# Patient Record
Sex: MALE | Race: ASIAN
[De-identification: names, ages, dates, MRNs, and addresses within clinical notes are randomized per-mention and may not be internally consistent; named-entity substitution may affect disease eponyms.]

---

## 2017-02-15 ENCOUNTER — CHART COPY (OUTPATIENT)
Age: 72
End: 2017-02-15

## 2017-02-23 ENCOUNTER — APPOINTMENT (OUTPATIENT)
Dept: ENDOCRINOLOGY | Facility: CLINIC | Age: 72
End: 2017-02-23

## 2017-02-23 VITALS
BODY MASS INDEX: 27.31 KG/M2 | OXYGEN SATURATION: 98 % | WEIGHT: 174 LBS | HEART RATE: 67 BPM | SYSTOLIC BLOOD PRESSURE: 102 MMHG | DIASTOLIC BLOOD PRESSURE: 70 MMHG | HEIGHT: 67 IN

## 2017-02-23 RX ORDER — ERGOCALCIFEROL 1.25 MG/1
1.25 MG CAPSULE ORAL
Refills: 0 | Status: ACTIVE | COMMUNITY

## 2017-02-28 LAB
24R-OH-CALCIDIOL SERPL-MCNC: 54.8 PG/ML
25(OH)D3 SERPL-MCNC: 77.6 NG/ML
ALBUMIN SERPL ELPH-MCNC: 4 G/DL
ALP BLD-CCNC: 70 U/L
ALT SERPL-CCNC: 31 U/L
ANION GAP SERPL CALC-SCNC: 12 MMOL/L
AST SERPL-CCNC: 26 U/L
BILIRUB SERPL-MCNC: 0.5 MG/DL
BUN SERPL-MCNC: 10 MG/DL
CALCIUM SERPL-MCNC: 8.8 MG/DL
CALCIUM SERPL-MCNC: 8.8 MG/DL
CHLORIDE SERPL-SCNC: 103 MMOL/L
CHOLEST SERPL-MCNC: 171 MG/DL
CHOLEST/HDLC SERPL: 4.8 RATIO
CO2 SERPL-SCNC: 26 MMOL/L
CREAT SERPL-MCNC: 1.13 MG/DL
GLUCOSE SERPL-MCNC: 92 MG/DL
HDLC SERPL-MCNC: 36 MG/DL
LDLC SERPL CALC-MCNC: 111 MG/DL
MAGNESIUM SERPL-MCNC: 2 MG/DL
PARATHYROID HORMONE INTACT: 54 PG/ML
PHOSPHATE SERPL-MCNC: 3.4 MG/DL
POTASSIUM SERPL-SCNC: 4.8 MMOL/L
PROT SERPL-MCNC: 6.5 G/DL
SODIUM SERPL-SCNC: 141 MMOL/L
TRIGL SERPL-MCNC: 119 MG/DL
TSH SERPL-ACNC: 2.16 UIU/ML
TTG IGA SER IA-ACNC: 7.7 UNITS
TTG IGA SER-ACNC: NEGATIVE

## 2017-04-26 ENCOUNTER — APPOINTMENT (OUTPATIENT)
Dept: ORTHOPEDIC SURGERY | Facility: CLINIC | Age: 72
End: 2017-04-26
Payer: MEDICARE

## 2017-04-26 VITALS
HEART RATE: 64 BPM | HEIGHT: 67 IN | DIASTOLIC BLOOD PRESSURE: 73 MMHG | WEIGHT: 174 LBS | SYSTOLIC BLOOD PRESSURE: 121 MMHG | BODY MASS INDEX: 27.31 KG/M2

## 2017-04-26 PROCEDURE — 99214 OFFICE O/P EST MOD 30 MIN: CPT | Mod: 25

## 2017-04-26 PROCEDURE — 20610 DRAIN/INJ JOINT/BURSA W/O US: CPT | Mod: LT

## 2017-04-26 PROCEDURE — 73562 X-RAY EXAM OF KNEE 3: CPT | Mod: LT

## 2017-04-26 RX ORDER — HYALURONATE SOD, CROSS-LINKED 30 MG/3 ML
30 SYRINGE (ML) INTRAARTICULAR
Qty: 2 | Refills: 0 | Status: ACTIVE | OUTPATIENT
Start: 2017-04-26

## 2017-07-24 ENCOUNTER — APPOINTMENT (OUTPATIENT)
Dept: ORTHOPEDIC SURGERY | Facility: CLINIC | Age: 72
End: 2017-07-24
Payer: MEDICARE

## 2017-07-24 VITALS
WEIGHT: 173 LBS | SYSTOLIC BLOOD PRESSURE: 132 MMHG | BODY MASS INDEX: 27.15 KG/M2 | HEART RATE: 65 BPM | HEIGHT: 67 IN | DIASTOLIC BLOOD PRESSURE: 75 MMHG

## 2017-07-24 PROCEDURE — 20610 DRAIN/INJ JOINT/BURSA W/O US: CPT | Mod: 50

## 2017-07-24 PROCEDURE — 99214 OFFICE O/P EST MOD 30 MIN: CPT | Mod: 25

## 2017-07-31 ENCOUNTER — CHART COPY (OUTPATIENT)
Age: 72
End: 2017-07-31

## 2017-11-10 ENCOUNTER — APPOINTMENT (OUTPATIENT)
Dept: ORTHOPEDIC SURGERY | Facility: CLINIC | Age: 72
End: 2017-11-10
Payer: MEDICARE

## 2017-11-10 VITALS — SYSTOLIC BLOOD PRESSURE: 131 MMHG | HEIGHT: 67 IN | DIASTOLIC BLOOD PRESSURE: 81 MMHG | HEART RATE: 74 BPM

## 2017-11-10 PROCEDURE — 20610 DRAIN/INJ JOINT/BURSA W/O US: CPT | Mod: 50

## 2017-11-16 RX ADMIN — Medication 0 MG/3ML: at 00:00

## 2017-11-20 RX ORDER — HYALURONATE SOD, CROSS-LINKED 30 MG/3 ML
30 SYRINGE (ML) INTRAARTICULAR
Refills: 0 | Status: COMPLETED | OUTPATIENT
Start: 2017-11-16

## 2018-02-21 ENCOUNTER — CHART COPY (OUTPATIENT)
Age: 73
End: 2018-02-21

## 2018-02-27 ENCOUNTER — APPOINTMENT (OUTPATIENT)
Dept: ORTHOPEDIC SURGERY | Facility: CLINIC | Age: 73
End: 2018-02-27
Payer: MEDICARE

## 2018-02-27 VITALS
BODY MASS INDEX: 27.15 KG/M2 | DIASTOLIC BLOOD PRESSURE: 82 MMHG | WEIGHT: 173 LBS | HEART RATE: 70 BPM | SYSTOLIC BLOOD PRESSURE: 122 MMHG | HEIGHT: 67 IN

## 2018-02-27 PROCEDURE — 99213 OFFICE O/P EST LOW 20 MIN: CPT | Mod: 25

## 2018-02-27 PROCEDURE — 20610 DRAIN/INJ JOINT/BURSA W/O US: CPT | Mod: LT

## 2018-02-27 PROCEDURE — 73562 X-RAY EXAM OF KNEE 3: CPT | Mod: 50

## 2018-02-27 RX ADMIN — LIDOCAINE HYDROCHLORIDE 3 %: 10 INJECTION, SOLUTION EPIDURAL; INFILTRATION; INTRACAUDAL; PERINEURAL at 00:00

## 2018-02-27 RX ADMIN — Medication 2 MG/ML: at 00:00

## 2018-03-01 RX ORDER — LIDOCAINE HYDROCHLORIDE 10 MG/ML
1 INJECTION, SOLUTION INFILTRATION; PERINEURAL
Refills: 0 | Status: COMPLETED | OUTPATIENT
Start: 2018-02-27

## 2018-03-01 RX ORDER — METHYLPRED ACET/NACL,ISO-OS/PF 40 MG/ML
40 VIAL (ML) INJECTION
Qty: 1 | Refills: 0 | Status: COMPLETED | OUTPATIENT
Start: 2018-02-27

## 2018-03-13 ENCOUNTER — APPOINTMENT (OUTPATIENT)
Dept: ORTHOPEDIC SURGERY | Facility: CLINIC | Age: 73
End: 2018-03-13
Payer: MEDICARE

## 2018-03-13 VITALS
SYSTOLIC BLOOD PRESSURE: 120 MMHG | BODY MASS INDEX: 27.15 KG/M2 | WEIGHT: 173 LBS | HEART RATE: 80 BPM | DIASTOLIC BLOOD PRESSURE: 74 MMHG | HEIGHT: 67 IN

## 2018-03-13 DIAGNOSIS — M19.079 PRIMARY OSTEOARTHRITIS, UNSPECIFIED ANKLE AND FOOT: ICD-10-CM

## 2018-03-13 PROCEDURE — 99213 OFFICE O/P EST LOW 20 MIN: CPT | Mod: 25

## 2018-03-13 PROCEDURE — 73630 X-RAY EXAM OF FOOT: CPT | Mod: LT

## 2018-03-13 PROCEDURE — 20605 DRAIN/INJ JOINT/BURSA W/O US: CPT | Mod: LT

## 2018-04-10 ENCOUNTER — CHART COPY (OUTPATIENT)
Age: 73
End: 2018-04-10

## 2018-05-15 ENCOUNTER — APPOINTMENT (OUTPATIENT)
Dept: ORTHOPEDIC SURGERY | Facility: CLINIC | Age: 73
End: 2018-05-15
Payer: MEDICARE

## 2018-05-15 VITALS
HEART RATE: 64 BPM | HEIGHT: 67 IN | WEIGHT: 173 LBS | BODY MASS INDEX: 27.15 KG/M2 | SYSTOLIC BLOOD PRESSURE: 116 MMHG | DIASTOLIC BLOOD PRESSURE: 73 MMHG

## 2018-05-15 DIAGNOSIS — M19.072 PRIMARY OSTEOARTHRITIS, LEFT ANKLE AND FOOT: ICD-10-CM

## 2018-05-15 PROCEDURE — 73630 X-RAY EXAM OF FOOT: CPT | Mod: RT

## 2018-05-15 PROCEDURE — 99213 OFFICE O/P EST LOW 20 MIN: CPT

## 2018-07-19 ENCOUNTER — EMERGENCY (EMERGENCY)
Facility: HOSPITAL | Age: 73
LOS: 1 days | Discharge: HOME | End: 2018-07-19
Attending: EMERGENCY MEDICINE
Payer: MEDICARE

## 2018-07-19 VITALS
HEART RATE: 68 BPM | OXYGEN SATURATION: 100 % | SYSTOLIC BLOOD PRESSURE: 127 MMHG | RESPIRATION RATE: 18 BRPM | DIASTOLIC BLOOD PRESSURE: 81 MMHG

## 2018-07-19 VITALS
WEIGHT: 166.89 LBS | SYSTOLIC BLOOD PRESSURE: 106 MMHG | OXYGEN SATURATION: 98 % | DIASTOLIC BLOOD PRESSURE: 77 MMHG | TEMPERATURE: 98 F | HEIGHT: 66 IN | HEART RATE: 95 BPM | RESPIRATION RATE: 18 BRPM

## 2018-07-19 LAB
ALBUMIN SERPL ELPH-MCNC: 3.4 G/DL — SIGNIFICANT CHANGE UP (ref 3.3–5)
ALP SERPL-CCNC: 82 U/L — SIGNIFICANT CHANGE UP (ref 40–120)
ALT FLD-CCNC: 14 U/L — SIGNIFICANT CHANGE UP (ref 10–45)
ANION GAP SERPL CALC-SCNC: 13 MMOL/L — SIGNIFICANT CHANGE UP (ref 5–17)
AST SERPL-CCNC: 18 U/L — SIGNIFICANT CHANGE UP (ref 10–40)
BASOPHILS # BLD AUTO: 0 K/UL — SIGNIFICANT CHANGE UP (ref 0–0.2)
BASOPHILS NFR BLD AUTO: 0.4 % — SIGNIFICANT CHANGE UP (ref 0–2)
BILIRUB SERPL-MCNC: 0.4 MG/DL — SIGNIFICANT CHANGE UP (ref 0.2–1.2)
BUN SERPL-MCNC: 9 MG/DL — SIGNIFICANT CHANGE UP (ref 7–23)
C DIFF GDH STL QL: NEGATIVE — SIGNIFICANT CHANGE UP
C DIFF GDH STL QL: SIGNIFICANT CHANGE UP
CALCIUM SERPL-MCNC: 8.8 MG/DL — SIGNIFICANT CHANGE UP (ref 8.4–10.5)
CHLORIDE SERPL-SCNC: 103 MMOL/L — SIGNIFICANT CHANGE UP (ref 96–108)
CO2 SERPL-SCNC: 23 MMOL/L — SIGNIFICANT CHANGE UP (ref 22–31)
CREAT SERPL-MCNC: 1.29 MG/DL — SIGNIFICANT CHANGE UP (ref 0.5–1.3)
EOSINOPHIL # BLD AUTO: 0 K/UL — SIGNIFICANT CHANGE UP (ref 0–0.5)
EOSINOPHIL NFR BLD AUTO: 0.4 % — SIGNIFICANT CHANGE UP (ref 0–6)
GAS PNL BLDV: SIGNIFICANT CHANGE UP
GLUCOSE SERPL-MCNC: 112 MG/DL — HIGH (ref 70–99)
HCT VFR BLD CALC: 46.3 % — SIGNIFICANT CHANGE UP (ref 39–50)
HGB BLD-MCNC: 15.1 G/DL — SIGNIFICANT CHANGE UP (ref 13–17)
LYMPHOCYTES # BLD AUTO: 17.1 % — SIGNIFICANT CHANGE UP (ref 13–44)
LYMPHOCYTES # BLD AUTO: 2 K/UL — SIGNIFICANT CHANGE UP (ref 1–3.3)
MCHC RBC-ENTMCNC: 30.1 PG — SIGNIFICANT CHANGE UP (ref 27–34)
MCHC RBC-ENTMCNC: 32.6 GM/DL — SIGNIFICANT CHANGE UP (ref 32–36)
MCV RBC AUTO: 92.3 FL — SIGNIFICANT CHANGE UP (ref 80–100)
MONOCYTES # BLD AUTO: 0.5 K/UL — SIGNIFICANT CHANGE UP (ref 0–0.9)
MONOCYTES NFR BLD AUTO: 4.1 % — SIGNIFICANT CHANGE UP (ref 2–14)
NEUTROPHILS # BLD AUTO: 9.1 K/UL — HIGH (ref 1.8–7.4)
NEUTROPHILS NFR BLD AUTO: 77.9 % — HIGH (ref 43–77)
PLATELET # BLD AUTO: 288 K/UL — SIGNIFICANT CHANGE UP (ref 150–400)
POTASSIUM SERPL-MCNC: 4.3 MMOL/L — SIGNIFICANT CHANGE UP (ref 3.5–5.3)
POTASSIUM SERPL-SCNC: 4.3 MMOL/L — SIGNIFICANT CHANGE UP (ref 3.5–5.3)
PROT SERPL-MCNC: 6.7 G/DL — SIGNIFICANT CHANGE UP (ref 6–8.3)
RBC # BLD: 5.01 M/UL — SIGNIFICANT CHANGE UP (ref 4.2–5.8)
RBC # FLD: 12.4 % — SIGNIFICANT CHANGE UP (ref 10.3–14.5)
SODIUM SERPL-SCNC: 139 MMOL/L — SIGNIFICANT CHANGE UP (ref 135–145)
WBC # BLD: 11.6 K/UL — HIGH (ref 3.8–10.5)
WBC # FLD AUTO: 11.6 K/UL — HIGH (ref 3.8–10.5)

## 2018-07-19 PROCEDURE — 82803 BLOOD GASES ANY COMBINATION: CPT

## 2018-07-19 PROCEDURE — 82272 OCCULT BLD FECES 1-3 TESTS: CPT

## 2018-07-19 PROCEDURE — 82330 ASSAY OF CALCIUM: CPT

## 2018-07-19 PROCEDURE — 82947 ASSAY GLUCOSE BLOOD QUANT: CPT

## 2018-07-19 PROCEDURE — 80053 COMPREHEN METABOLIC PANEL: CPT

## 2018-07-19 PROCEDURE — 74174 CTA ABD&PLVS W/CONTRAST: CPT

## 2018-07-19 PROCEDURE — 87324 CLOSTRIDIUM AG IA: CPT

## 2018-07-19 PROCEDURE — 99284 EMERGENCY DEPT VISIT MOD MDM: CPT | Mod: GC

## 2018-07-19 PROCEDURE — 84295 ASSAY OF SERUM SODIUM: CPT

## 2018-07-19 PROCEDURE — 87449 NOS EACH ORGANISM AG IA: CPT

## 2018-07-19 PROCEDURE — 74178 CT ABD&PLV WO CNTR FLWD CNTR: CPT

## 2018-07-19 PROCEDURE — 85027 COMPLETE CBC AUTOMATED: CPT

## 2018-07-19 PROCEDURE — 85014 HEMATOCRIT: CPT

## 2018-07-19 PROCEDURE — 84132 ASSAY OF SERUM POTASSIUM: CPT

## 2018-07-19 PROCEDURE — 83605 ASSAY OF LACTIC ACID: CPT

## 2018-07-19 PROCEDURE — 99284 EMERGENCY DEPT VISIT MOD MDM: CPT | Mod: 25

## 2018-07-19 PROCEDURE — 82435 ASSAY OF BLOOD CHLORIDE: CPT

## 2018-07-19 PROCEDURE — 96374 THER/PROPH/DIAG INJ IV PUSH: CPT | Mod: XU

## 2018-07-19 PROCEDURE — 74178 CT ABD&PLV WO CNTR FLWD CNTR: CPT | Mod: 26

## 2018-07-19 RX ORDER — ONDANSETRON 8 MG/1
4 TABLET, FILM COATED ORAL ONCE
Qty: 0 | Refills: 0 | Status: COMPLETED | OUTPATIENT
Start: 2018-07-19 | End: 2018-07-19

## 2018-07-19 RX ORDER — SODIUM CHLORIDE 9 MG/ML
1000 INJECTION INTRAMUSCULAR; INTRAVENOUS; SUBCUTANEOUS ONCE
Qty: 0 | Refills: 0 | Status: COMPLETED | OUTPATIENT
Start: 2018-07-19 | End: 2018-07-19

## 2018-07-19 RX ORDER — ONDANSETRON 8 MG/1
1 TABLET, FILM COATED ORAL
Qty: 12 | Refills: 0
Start: 2018-07-19 | End: 2018-07-22

## 2018-07-19 RX ADMIN — SODIUM CHLORIDE 2000 MILLILITER(S): 9 INJECTION INTRAMUSCULAR; INTRAVENOUS; SUBCUTANEOUS at 17:16

## 2018-07-19 RX ADMIN — ONDANSETRON 4 MILLIGRAM(S): 8 TABLET, FILM COATED ORAL at 19:19

## 2018-07-19 RX ADMIN — Medication 20 MILLIGRAM(S): at 19:19

## 2018-07-19 NOTE — ED PROVIDER NOTE - OBJECTIVE STATEMENT
74 yo c PMH of hiatal hernia and GERD p/w 1 day hx of bright red bloody diarrhea x 5 episodes today associated with BLQ abdominal pain that occurs when pt feels urge to have BM, abdominal pain alleviated after BM. Pt was in [place] 14 days ago, was bitten by an insect, developed an abscess on the L thigh, was started on Augmentin 8 days ago and abscess I&D'd by general surgery 4 days ago. Pt did not take augmentin today. No hx of GIB, pt had normal endoscopy and colonscopy 6 years ago. Pt went to GI doctor today who referred him to ED with note: "73 yr Male c drained abscess L thigh, now has acute onset rectal bleeding and watery diarrhea c abdominal pain and 3 episodes of vomiting. P: 97/min, BP: 108/60, h/o augmentin use. PE: tenderness in hypogastric region, rectaL: FOBT+, infectious colitis vs c diff colitis vs ischemic colitiis" - Dr Carlos Small     ROS positive: bloody diarrhea, abdominal pain, n/v, lightheadedness  ROS negative: f/c, CP, SOB, syncope, leg edema, hemoptysis

## 2018-07-19 NOTE — ED PROVIDER NOTE - MEDICAL DECISION MAKING DETAILS
74 yo c PMH of hiatal hernia and GERD p/w 1 day hx of bright red bloody diarrhea x 5 episodes today associated with BLQ abdominal pain, lightheadedness, and n/v. On exam, HR 95, /77, VS otherwise wnl, no abdominal ttp. labs and rectal exam pending. IVF ordered. pt denies pain at this time and declines pain meds at this time. will reassess. 72 yo c PMH of hiatal hernia and GERD p/w 1 day hx of bright red bloody diarrhea x 5 episodes today associated with BLQ abdominal pain, lightheadedness, and n/v. On exam, HR 95, /77, VS otherwise wnl, no abdominal ttp. labs and rectal exam negative however no feces able to be collected on exam, FOBT+ in GI office today per GI note. IVF ordered. pt denies pain at this time and declines pain meds at this time. will reassess.

## 2018-07-19 NOTE — ED PROVIDER NOTE - ATTENDING CONTRIBUTION TO CARE
Patient presenting with multiple episodes of bloody diarrhea today.  Was on course of augmentin, just completed.  Reporting diffuse abdominal cramping prior to bowel movements, no nausea or vomiting, no sick contacts.  Returned from trip to [place] approx one week ago.    On exam patient non toxic appearing, vital signs within normal limits, RRR S1/S2, lungs clear to ascultation bilaterally, abdomen diffusely tender to palpation (mildly) without rebound or guarding.    DDx includes post antibiotic diarrhea, c-diff, other infectious colitis, also possible ischemic colitis, plan for labs, stool studies/cultures/O&P given recent travel, CT A/p to evaluate for degree of colitis and r/o mesenteric ischemia, re-eval.

## 2018-07-19 NOTE — ED PROVIDER NOTE - PHYSICAL EXAMINATION
GENERAL: Well appearing, well nourished, awake, alert and in NAD  ENMT: Airway patent, no maury blood   CARDIAC: Regular rate, regular rhythm.  Heart sounds S1, S2, no S3, S4. No murmurs, rubs or gallops.  RESPIRATORY: Breath sounds clear and equal in bilateral anterior lung fields, no wheezes/ronchi/stridor; pt breathing and speaking comfortably with no increased WOB, no accessory mm. use, no intercostal retractions, no nasal flaring  GI: no ecchymosis, erythema, or lacerations, abdomen soft, non-distended, non-tender, no rebound or guarding. GENERAL: Well appearing, well nourished, awake, alert and in NAD  ENMT: Airway patent, no maury blood   CARDIAC: Regular rate, regular rhythm.  Heart sounds S1, S2, no S3, S4. No murmurs, rubs or gallops.  RESPIRATORY: Breath sounds clear and equal in bilateral anterior lung fields, no wheezes/ronchi/stridor; pt breathing and speaking comfortably with no increased WOB, no accessory mm. use, no intercostal retractions, no nasal flaring  GI: no ecchymosis, erythema, or lacerations, abdomen soft, non-distended, non-tender, no rebound or guarding.  guiac negative, chaperone: Montrell Maravilla MD

## 2018-07-19 NOTE — ED ADULT TRIAGE NOTE - CHIEF COMPLAINT QUOTE
black stools associated with abdominal pain, took Augmentin for 7 days for abscess of left thigh, dizziness last night

## 2018-07-19 NOTE — ED PROVIDER NOTE - PROGRESS NOTE DETAILS
Patient reporting feeling improved.  Hbg normal.  CTA without evidence of mesenteric ischemia.  Tolerating PO in Emergency Department.  Has GI follow up tomorrow.  Will discharge.  Callum Maravilla M.D.

## 2018-08-15 ENCOUNTER — CHART COPY (OUTPATIENT)
Age: 73
End: 2018-08-15

## 2018-08-27 PROBLEM — K44.9 DIAPHRAGMATIC HERNIA WITHOUT OBSTRUCTION OR GANGRENE: Chronic | Status: ACTIVE | Noted: 2018-07-19

## 2018-08-28 ENCOUNTER — APPOINTMENT (OUTPATIENT)
Dept: ORTHOPEDIC SURGERY | Facility: CLINIC | Age: 73
End: 2018-08-28
Payer: MEDICARE

## 2018-08-28 VITALS — HEART RATE: 78 BPM | DIASTOLIC BLOOD PRESSURE: 80 MMHG | SYSTOLIC BLOOD PRESSURE: 138 MMHG

## 2018-08-28 PROCEDURE — 20610 DRAIN/INJ JOINT/BURSA W/O US: CPT | Mod: 50

## 2018-10-23 ENCOUNTER — APPOINTMENT (OUTPATIENT)
Dept: ORTHOPEDIC SURGERY | Facility: CLINIC | Age: 73
End: 2018-10-23
Payer: MEDICARE

## 2018-10-23 VITALS — HEART RATE: 66 BPM | DIASTOLIC BLOOD PRESSURE: 74 MMHG | SYSTOLIC BLOOD PRESSURE: 112 MMHG

## 2018-10-23 DIAGNOSIS — M19.071 PRIMARY OSTEOARTHRITIS, RIGHT ANKLE AND FOOT: ICD-10-CM

## 2018-10-23 PROCEDURE — 20610 DRAIN/INJ JOINT/BURSA W/O US: CPT | Mod: RT

## 2018-10-23 PROCEDURE — 99213 OFFICE O/P EST LOW 20 MIN: CPT | Mod: 25

## 2018-10-23 PROCEDURE — 73562 X-RAY EXAM OF KNEE 3: CPT | Mod: RT

## 2018-10-23 RX ADMIN — LIDOCAINE HYDROCHLORIDE 3 %: 10 INJECTION, SOLUTION INFILTRATION; PERINEURAL at 00:00

## 2018-10-23 RX ADMIN — METHYLPREDNISOLONE ACETATE 2 MG/ML: 40 INJECTION, SUSPENSION INTRALESIONAL; INTRAMUSCULAR; INTRASYNOVIAL; SOFT TISSUE at 00:00

## 2018-10-24 RX ORDER — LIDOCAINE HYDROCHLORIDE 10 MG/ML
1 INJECTION, SOLUTION INFILTRATION; PERINEURAL
Refills: 0 | Status: COMPLETED | OUTPATIENT
Start: 2018-10-23

## 2018-10-24 RX ORDER — METHYLPRED ACET/NACL,ISO-OS/PF 40 MG/ML
40 VIAL (ML) INJECTION
Qty: 1 | Refills: 0 | Status: COMPLETED | OUTPATIENT
Start: 2018-10-23

## 2019-04-17 ENCOUNTER — APPOINTMENT (OUTPATIENT)
Dept: ORTHOPEDIC SURGERY | Facility: CLINIC | Age: 74
End: 2019-04-17
Payer: MEDICARE

## 2019-04-17 VITALS
WEIGHT: 170 LBS | DIASTOLIC BLOOD PRESSURE: 82 MMHG | SYSTOLIC BLOOD PRESSURE: 133 MMHG | HEIGHT: 66 IN | HEART RATE: 65 BPM | BODY MASS INDEX: 27.32 KG/M2

## 2019-04-17 DIAGNOSIS — M19.041 PRIMARY OSTEOARTHRITIS, RIGHT HAND: ICD-10-CM

## 2019-04-17 DIAGNOSIS — S63.91XA SPRAIN OF UNSPECIFIED PART OF RIGHT WRIST AND HAND, INITIAL ENCOUNTER: ICD-10-CM

## 2019-04-17 PROCEDURE — 73130 X-RAY EXAM OF HAND: CPT | Mod: RT

## 2019-04-17 PROCEDURE — 99213 OFFICE O/P EST LOW 20 MIN: CPT

## 2019-04-17 NOTE — PHYSICAL EXAM
[de-identified] : - Constitutional: This is a male, in no obvious distress.  \par - Psych: Patient is alert and oriented to person, place and time.  Patient has a normal mood and affect.\par - Cardiovascular: Normal pulses throughout the upper extremities.  \par - Respiratory:  Patient exhibits no evidence of shortness of breath or difficulty breathing.\par - Skin: No rashes, lesions, or other abnormalities are noted in the upper extremities.\par \par ---\par \par Examination of his right hand demonstrates swelling along the index finger, proximal phalanx.  He is tender in this region.  He also has tenderness along the PIP joint.  He has full extension with limitation of terminal flexion.  He appears to have some underlying arthritis.  There is no instability of the index finger PIP or MCP joint.  He has swelling and tenderness of the CMC joint of the thumb, consistent with arthritis.  He is neurovascularly intact distally. [de-identified] : \par AP, lateral, and oblique, radiographs of his right hand, demonstrate no acute fractures.  He is underlying arthritis of the digits, including the CMC joint of the thumb, and the PIP, and DIP joints of the index finger.

## 2019-04-17 NOTE — HISTORY OF PRESENT ILLNESS
[Right] : right hand dominant [FreeTextEntry1] : He comes in today for evaluation of a right index finger injury.  He accidentally hit the finger on a wall 2 days ago.  He has had pain and swelling since then.  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

## 2019-04-23 ENCOUNTER — APPOINTMENT (OUTPATIENT)
Dept: ORTHOPEDIC SURGERY | Facility: CLINIC | Age: 74
End: 2019-04-23
Payer: MEDICARE

## 2019-04-23 VITALS — SYSTOLIC BLOOD PRESSURE: 137 MMHG | DIASTOLIC BLOOD PRESSURE: 86 MMHG | HEART RATE: 62 BPM

## 2019-04-23 PROCEDURE — 73562 X-RAY EXAM OF KNEE 3: CPT | Mod: 50

## 2019-04-23 PROCEDURE — 20610 DRAIN/INJ JOINT/BURSA W/O US: CPT | Mod: 50

## 2019-04-23 PROCEDURE — 99213 OFFICE O/P EST LOW 20 MIN: CPT | Mod: 25

## 2019-04-23 RX ADMIN — Medication 0 MG/3ML: at 00:00

## 2019-04-23 RX ADMIN — LIDOCAINE HYDROCHLORIDE 3 %: 10 INJECTION, SOLUTION INFILTRATION; PERINEURAL at 00:00

## 2019-04-26 RX ORDER — LIDOCAINE HYDROCHLORIDE 10 MG/ML
1 INJECTION, SOLUTION INFILTRATION; PERINEURAL
Refills: 0 | Status: COMPLETED | OUTPATIENT
Start: 2019-04-23

## 2019-04-26 RX ORDER — HYALURONATE SOD, CROSS-LINKED 30 MG/3 ML
30 SYRINGE (ML) INTRAARTICULAR
Refills: 0 | Status: COMPLETED | OUTPATIENT
Start: 2019-04-23

## 2019-04-30 ENCOUNTER — APPOINTMENT (OUTPATIENT)
Dept: ORTHOPEDIC SURGERY | Facility: CLINIC | Age: 74
End: 2019-04-30
Payer: MEDICARE

## 2019-04-30 VITALS — WEIGHT: 170 LBS | BODY MASS INDEX: 27.32 KG/M2 | HEIGHT: 66 IN

## 2019-04-30 PROCEDURE — 20610 DRAIN/INJ JOINT/BURSA W/O US: CPT | Mod: RT

## 2019-04-30 PROCEDURE — 99213 OFFICE O/P EST LOW 20 MIN: CPT | Mod: 25

## 2019-04-30 RX ADMIN — LIDOCAINE HYDROCHLORIDE 3 %: 10 INJECTION, SOLUTION INFILTRATION; PERINEURAL at 00:00

## 2019-04-30 RX ADMIN — METHYLPREDNISOLONE ACETATE 2 MG/ML: 40 INJECTION, SUSPENSION INTRALESIONAL; INTRAMUSCULAR; INTRASYNOVIAL; SOFT TISSUE at 00:00

## 2019-05-02 RX ORDER — LIDOCAINE HYDROCHLORIDE 10 MG/ML
1 INJECTION, SOLUTION INFILTRATION; PERINEURAL
Refills: 0 | Status: COMPLETED | OUTPATIENT
Start: 2019-04-30

## 2019-05-02 RX ORDER — METHYLPRED ACET/NACL,ISO-OS/PF 40 MG/ML
40 VIAL (ML) INJECTION
Qty: 1 | Refills: 0 | Status: COMPLETED | OUTPATIENT
Start: 2019-04-30

## 2019-07-02 ENCOUNTER — APPOINTMENT (OUTPATIENT)
Dept: ORTHOPEDIC SURGERY | Facility: CLINIC | Age: 74
End: 2019-07-02
Payer: MEDICARE

## 2019-07-02 VITALS
HEART RATE: 69 BPM | SYSTOLIC BLOOD PRESSURE: 153 MMHG | BODY MASS INDEX: 27.32 KG/M2 | WEIGHT: 170 LBS | HEIGHT: 66 IN | DIASTOLIC BLOOD PRESSURE: 83 MMHG

## 2019-07-02 PROCEDURE — 73562 X-RAY EXAM OF KNEE 3: CPT | Mod: RT

## 2019-07-02 PROCEDURE — 20610 DRAIN/INJ JOINT/BURSA W/O US: CPT | Mod: RT

## 2019-07-02 PROCEDURE — 99213 OFFICE O/P EST LOW 20 MIN: CPT | Mod: 25

## 2019-07-02 RX ADMIN — Medication MG/ML: at 00:00

## 2019-07-02 RX ADMIN — LIDOCAINE HYDROCHLORIDE %: 10 INJECTION, SOLUTION INFILTRATION; PERINEURAL at 00:00

## 2019-07-02 NOTE — DISCUSSION/SUMMARY
[de-identified] : Plan for the patient is to continue with the present level of activities. Patient was explained that he does have osteoarthritic changes in the right knee. He was explained the different modalities of treatment which include conservative management versus surgical intervention. The patient is advised to continue with his exercise program as well as ice pack/moist heat when needed. He was offered a cortisone injection today in the office. Prior to the injection he was explained the risks benefits pros and cons of injection as well as alternatives. He was explained that there is no guarantee for complete relief. And that this is not occur over his osteoarthritis. He was explained that in the foreseeable future he may be a candidate for a right total knee replacement. However if he can do well doing conservative management. It is recommended that he continue with this current treatment as previously discussed. He is advised to return back to the office should this pain persists or worsens. Otherwise it is suggested that he continue with a good exercise program with pain medication in the form of over-the-counter NSAIDs only when needed.

## 2019-07-02 NOTE — PROCEDURE
[Injection] : Injection [Right] : of the right [Knee Joint] : knee joint [Patient] : patient [Osteoarthritis] : Osteoarthritis [Risk] : risk [Benefits] : benefits [Alternatives] : alternatives [Bleeding] : bleeding [Infection] : infection [Allergic Reaction] : allergic reaction [Verbal Consent Obtained] : verbal consent was obtained prior to the procedure [Alcohol] : Alcohol [Anterior] : anterior [Medial] : medial [Ethyl Chloride Spray] : ethyl chloride spray was used as a topical anesthetic [25] : a 25-gauge [1% Lidocaine___(mL)] : [unfilled] mL of 1% Lidocaine [Bandage Applied] : a bandage [Methylpred. 40mg/mL___(mL)] : [unfilled] mL of 40mg/mL methylprednisolone [None] : none [Tolerated Well] : The patient tolerated the procedure well

## 2019-07-02 NOTE — HISTORY OF PRESENT ILLNESS
[de-identified] : Patient is a 74-year-old male who presents today for an evaluation of his right knee. Patient is a known patient in his office due to his osteoarthritis. When he was last seen in the office in April he did receive a cortisone injection. He states that the injection did markedly improve his condition as he was able to continue with his activities. Since his last office visit he states that he did go on his hike in [place]. He states that he did fairly well ascending a mountain however with 2 days of descending the mountain he noticed a marked increase in pain in his right knee. Currently he states the pain is more pronounced in the medial aspect of his knee. He states that this is worse with any strenuous activities including prolonged standing and walking. He denies any specific injury or accident. Denies any buckling or locking. He presents today for evaluation regarding his right knee condition. [Stable] : stable [Walking] : walking

## 2019-07-02 NOTE — PHYSICAL EXAM
[LE] : Sensory: Intact in bilateral lower extremities [de-identified] : On physical examination of the right knee. The skin is clean dry and intact with no areas of infection superficial or deep. There is no redness swelling or heat noted. There is some mild diffuse pain and tenderness primarily in the patellofemoral as well as the medial femoral tibial compartment. The overall alignment is within neutral position. There is no evidence of ligamentous laxity. As this was tested in both anterior and posterior drawer tests as well as varus and valgus stress testing. Patient has excellent range of motion both passively and actively. As he is able to fully extend the knee and flex the knee past 125°. Crepitus is noted at the patellofemoral compartment. There is no evidence of any motor or sensory deficit. Patient does have good distal pulses and no calf tenderness. [de-identified] : X-rays of the right knee reveals osteoarthritic changes with some narrowing of the joint space and particularly the medial femoral tibial compartment. There is also some osteophyte formation noted all compatible with DJD of the right knee. There is no evidence of any fracture or dislocation. [ALL] : dorsalis pedis, posterior tibial, femoral, popliteal, and radial 2+ and symmetric bilaterally

## 2019-07-05 RX ORDER — METHYLPRED ACET/NACL,ISO-OS/PF 40 MG/ML
40 VIAL (ML) INJECTION
Qty: 1 | Refills: 0 | Status: COMPLETED | OUTPATIENT
Start: 2019-07-02

## 2019-07-05 RX ORDER — LIDOCAINE HYDROCHLORIDE 10 MG/ML
1 INJECTION, SOLUTION INFILTRATION; PERINEURAL
Refills: 0 | Status: COMPLETED | OUTPATIENT
Start: 2019-07-02

## 2019-07-30 ENCOUNTER — APPOINTMENT (OUTPATIENT)
Dept: ORTHOPEDIC SURGERY | Facility: CLINIC | Age: 74
End: 2019-07-30
Payer: MEDICARE

## 2019-07-30 VITALS
SYSTOLIC BLOOD PRESSURE: 136 MMHG | DIASTOLIC BLOOD PRESSURE: 83 MMHG | HEIGHT: 66 IN | HEART RATE: 64 BPM | BODY MASS INDEX: 27.32 KG/M2 | WEIGHT: 170 LBS

## 2019-07-30 DIAGNOSIS — M17.11 UNILATERAL PRIMARY OSTEOARTHRITIS, RIGHT KNEE: ICD-10-CM

## 2019-07-30 PROCEDURE — 99213 OFFICE O/P EST LOW 20 MIN: CPT

## 2019-07-30 NOTE — PHYSICAL EXAM
[Antalgic] : antalgic [UE/LE] : Sensory: Intact in bilateral upper & lower extremities [ALL] : Biceps, brachioradialis, triceps, patellar, ankle and plantar 2+ and symmetric bilaterally [Knee Swelling Right] : swelling [Knee Motion Right Crepitus] : crepitus with ROM [Knee Tenderness On Palpation Right] : tenderness [Knee Motion Right] : full ROM [Knee Lateral Instability Right] : positive laxity on varus stress [Normal RLE] : Right Lower Extremity: No scars, rashes, lesions, ulcers, skin intact [Normal LLE] : Left Lower Extremity: No scars, rashes, lesions, ulcers, skin intact [Normal Touch] : sensation intact for touch [Normal] : no peripheral adenopathy appreciated [Left Hip Was Examined] : negative impingement test [Knee Anterior Drawer Sign Right] : negative anterior drawer sign [Sacroiliac Misha-Fabere Test Left Side] : negative Gisel [Knee Posterior Drawer Sign Right] : negative posterior drawer sign [Knee Medial Instability Right] : no laxity on valgus stress [Knee Anterior Drawer Sign Left] : negative anterior drawer sign [Knee Medial Instability Left] : no laxity on valgus stress [Knee Lateral Instability Left] : no  laxity on varus stress [Knee Posterior Drawer Sign Left] : negative posterior drawer sign [Acute Distress] : not in acute distress [Obese] : not obese [Poor Appearance] : well-appearing [Abl Affect] : with normal affect [Disorientation] : oriented x 3 [Poor Coordination] : normal coordination [de-identified] : Right knee with contrast flexion and -5° to 130+ degrees of flexion.\par Left knee with -5° of a contracture flexion on extension to 130° of flexion.\par Right knee with good quad formation no patella lag is noted\par Varus malalignment is obvious on physical exam of the patient's in the supine position and when ambulating.\par  [de-identified] : left lower leg woth mild varicosities [de-identified] : July 2nd, 2019 with Dr Weinstein patient obtained Standing x-rays 3 views demonstrating  Grade 4 severe tricompartmental degenerative joint disease of right knee with a varus malalignment on AP view. Periarticular osteophytes are seen on the medial and lateral compartments of the femoral condyles and tibial plateaus. Decreased space of the medial compartment, with bone on bone contact. Subchondral sclerotic surface of the medial compartment. Lateral view is demonstrating the malalignment of the medial compartment. Large osteophytes off the patella. Loose bodies seen in the posterior knee compartment. Bone on bone contact of the patella to the femoral groove, demonstrating the patellofemoral joint surfaces of DJD. Sunrise view of the right knee is demonstrating lateralization of the patella to the femoral groove and large osteophytes also exhibited from the femoral condyles and the patella.\par No fractures.

## 2019-07-30 NOTE — DISCUSSION/SUMMARY
[Medication Risks Reviewed] : Medication risks reviewed [Surgical risks reviewed] : Surgical risks reviewed [de-identified] : Patient will be required to undergo medical clearance for the elective right total knee replacement.\par \par I reviewed the plan of care as well as a model of a total knee replacement implant equivalent to the one that will be used for a day or total knee replacement. The patient agreed to the plan of care as well as the use of implants for there left total knee replacement\par \par Dr. Salgado evaluated this patient and is guiding this patient's entire orthopedic management plan. The patient was advised that based upon their clinical presentation and radiographic findings they meet inclusion criteria for benefitting from a right total knee arthroplasty as a treatment option for severe arthritis of the knee.\par The spectrum of treatments for severe knee DJD were reviewed in detail. Dr. Salgado reviewed in detail the goals, alternatives, risks and benefits of total knee arthroplasty. \par The patient was advised of the possible complications which are inclusive of but not exclusive to VTE-related, infectious-related, anesthetic-related, surgically-related, medically-related, and implant-related. \par The patient was advised of the goals, alternatives, risks and benefits of autologous, directed and banked blood product transfusions for perioperative blood losses.\par The patient was advised that Dr. Salgado operates as a surgical team with his associate Dr. CHARO Weinstein.\par The patient was advised that they will require a medical preoperative risk evaluation by their PCP. Further medical subspecialty clearances such as cardiac may be indicated if felt needed by their PCP.\par The patient was advised he/she may call our office after careful consideration of their treatment options and further consultation with any other physician, to begin the process of preoperative planning for elective right TKR at a time of their choosing. \par

## 2019-07-30 NOTE — HISTORY OF PRESENT ILLNESS
[Pain Location] : pain [] : right & left knee [Worsening] : worsening [7] : a maximum pain level of 7/10 [___ yrs] : [unfilled] year(s) ago [Daily] : ~He/She~ states the symptoms seem to be occuring daily [Standing] : standing [Walking] : worsened by walking [Direct Pressure] : worsened by direct pressure [Knee Flexion] : worsened with knee flexion [NSAIDs] : relieved by nonsteroidal anti-inflammatory drugs [Physical Therapy] : relieved by physical therapy [de-identified] : One initial consultation with bilateral knee pain right is worse than the left. Patient has been following with Dr. Weinstein for visco-supplementation to bilateral knees and the last series was in the right knee at this past July 2019. Patient has also undergone physical therapy as well. Patient has been having increasingly difficult time with his walking and hiking which he enjoys. Patient also feels that the right knee pain is what making the left knee hurt more recently because of how much he depends on the that left knee to use with stairs and standing.\par Patient has a history of bilateral knee arthroscopies with partial meniscectomies.\par Denies any history of DVT and infectious diseases.

## 2019-08-09 ENCOUNTER — OUTPATIENT (OUTPATIENT)
Dept: OUTPATIENT SERVICES | Facility: HOSPITAL | Age: 74
LOS: 1 days | End: 2019-08-09
Payer: MEDICARE

## 2019-08-09 VITALS
OXYGEN SATURATION: 98 % | RESPIRATION RATE: 16 BRPM | HEART RATE: 64 BPM | DIASTOLIC BLOOD PRESSURE: 88 MMHG | WEIGHT: 175.05 LBS | TEMPERATURE: 98 F | HEIGHT: 66.5 IN | SYSTOLIC BLOOD PRESSURE: 156 MMHG

## 2019-08-09 DIAGNOSIS — Z01.818 ENCOUNTER FOR OTHER PREPROCEDURAL EXAMINATION: ICD-10-CM

## 2019-08-09 DIAGNOSIS — Z98.890 OTHER SPECIFIED POSTPROCEDURAL STATES: Chronic | ICD-10-CM

## 2019-08-09 DIAGNOSIS — M17.11 UNILATERAL PRIMARY OSTEOARTHRITIS, RIGHT KNEE: ICD-10-CM

## 2019-08-09 DIAGNOSIS — Z98.49 CATARACT EXTRACTION STATUS, UNSPECIFIED EYE: Chronic | ICD-10-CM

## 2019-08-09 LAB
ALBUMIN SERPL ELPH-MCNC: 3.4 G/DL — SIGNIFICANT CHANGE UP (ref 3.3–5)
ALP SERPL-CCNC: 107 U/L — SIGNIFICANT CHANGE UP (ref 30–120)
ALT FLD-CCNC: 21 U/L DA — SIGNIFICANT CHANGE UP (ref 10–60)
ANION GAP SERPL CALC-SCNC: 5 MMOL/L — SIGNIFICANT CHANGE UP (ref 5–17)
APTT BLD: 28.9 SEC — SIGNIFICANT CHANGE UP (ref 28.5–37)
AST SERPL-CCNC: 20 U/L — SIGNIFICANT CHANGE UP (ref 10–40)
BILIRUB SERPL-MCNC: 0.4 MG/DL — SIGNIFICANT CHANGE UP (ref 0.2–1.2)
BLD GP AB SCN SERPL QL: SIGNIFICANT CHANGE UP
BUN SERPL-MCNC: 9 MG/DL — SIGNIFICANT CHANGE UP (ref 7–23)
CALCIUM SERPL-MCNC: 8.3 MG/DL — LOW (ref 8.4–10.5)
CHLORIDE SERPL-SCNC: 106 MMOL/L — SIGNIFICANT CHANGE UP (ref 96–108)
CO2 SERPL-SCNC: 28 MMOL/L — SIGNIFICANT CHANGE UP (ref 22–31)
CREAT SERPL-MCNC: 1.08 MG/DL — SIGNIFICANT CHANGE UP (ref 0.5–1.3)
GLUCOSE SERPL-MCNC: 95 MG/DL — SIGNIFICANT CHANGE UP (ref 70–99)
HCT VFR BLD CALC: 42.4 % — SIGNIFICANT CHANGE UP (ref 39–50)
HGB BLD-MCNC: 13.8 G/DL — SIGNIFICANT CHANGE UP (ref 13–17)
INR BLD: 1.01 RATIO — SIGNIFICANT CHANGE UP (ref 0.88–1.16)
MCHC RBC-ENTMCNC: 29.6 PG — SIGNIFICANT CHANGE UP (ref 27–34)
MCHC RBC-ENTMCNC: 32.5 GM/DL — SIGNIFICANT CHANGE UP (ref 32–36)
MCV RBC AUTO: 90.8 FL — SIGNIFICANT CHANGE UP (ref 80–100)
MRSA PCR RESULT.: SIGNIFICANT CHANGE UP
NRBC # BLD: 0 /100 WBCS — SIGNIFICANT CHANGE UP (ref 0–0)
PLATELET # BLD AUTO: 188 K/UL — SIGNIFICANT CHANGE UP (ref 150–400)
POTASSIUM SERPL-MCNC: 4.5 MMOL/L — SIGNIFICANT CHANGE UP (ref 3.5–5.3)
POTASSIUM SERPL-SCNC: 4.5 MMOL/L — SIGNIFICANT CHANGE UP (ref 3.5–5.3)
PROT SERPL-MCNC: 7 G/DL — SIGNIFICANT CHANGE UP (ref 6–8.3)
PROTHROM AB SERPL-ACNC: 11 SEC — SIGNIFICANT CHANGE UP (ref 10–12.9)
RBC # BLD: 4.67 M/UL — SIGNIFICANT CHANGE UP (ref 4.2–5.8)
RBC # FLD: 14 % — SIGNIFICANT CHANGE UP (ref 10.3–14.5)
S AUREUS DNA NOSE QL NAA+PROBE: SIGNIFICANT CHANGE UP
SODIUM SERPL-SCNC: 139 MMOL/L — SIGNIFICANT CHANGE UP (ref 135–145)
WBC # BLD: 6.68 K/UL — SIGNIFICANT CHANGE UP (ref 3.8–10.5)
WBC # FLD AUTO: 6.68 K/UL — SIGNIFICANT CHANGE UP (ref 3.8–10.5)

## 2019-08-09 PROCEDURE — 93010 ELECTROCARDIOGRAM REPORT: CPT

## 2019-08-09 NOTE — H&P PST ADULT - NSICDXPASTMEDICALHX_GEN_ALL_CORE_FT
PAST MEDICAL HISTORY:  Dry eye syndrome, bilateral     GERD (gastroesophageal reflux disease)     H/O gastritis     Hiatal hernia     Osteoarthritis

## 2019-08-09 NOTE — H&P PST ADULT - HISTORY OF PRESENT ILLNESS
73yo male patient with several year history of progressively worsening right knee pain. He has gel and Cortisone injections with very temporary improvement ("lasted 2 weeks") He rates the pain at 7/10. He is not taking anything for pain. He was told that TKR is recommended and he presents today for PSTs.

## 2019-08-09 NOTE — H&P PST ADULT - MUSCULOSKELETAL
details… detailed exam right knee (minimal swelling)/no joint erythema/joint swelling/decreased ROM due to pain/no calf tenderness/decreased ROM/no joint warmth/diminished strength

## 2019-08-09 NOTE — H&P PST ADULT - NSICDXPROBLEM_GEN_ALL_CORE_FT
PROBLEM DIAGNOSES  Problem: Primary osteoarthritis of right knee  Assessment and Plan: RIGHT Total Knee Replacement on 8/21/19

## 2019-08-09 NOTE — H&P PST ADULT - ASSESSMENT
75yo male patient scheduled for surgery on 8/21/19. He will obtain Medical Clearance from Dr. Shaikh. He will be NPO as per Anesthesia and will take Pantoprazole on AM of surgery with a sip of water. All pre-op instructions reviewed with patient. He denies any metal allergies.

## 2019-08-09 NOTE — H&P PST ADULT - NSICDXFAMILYHX_GEN_ALL_CORE_FT
FAMILY HISTORY:  Family history of asthma in father, father-  Family history of hypertension in mother, mother-  FHx: myocardial infarction, mother-

## 2019-08-13 DIAGNOSIS — H04.123 DRY EYE SYNDROME OF BILATERAL LACRIMAL GLANDS: ICD-10-CM

## 2019-08-13 DIAGNOSIS — K21.9 GASTRO-ESOPHAGEAL REFLUX DISEASE WITHOUT ESOPHAGITIS: ICD-10-CM

## 2019-08-13 DIAGNOSIS — Z01.818 ENCOUNTER FOR OTHER PREPROCEDURAL EXAMINATION: ICD-10-CM

## 2019-08-13 NOTE — CONSULT NOTE ADULT - ASSESSMENT
Pt was notified of recommendation for endocrine consultation by Dr. Robles's staff and given telephone number for scheduling.    74M presents for pre-op eval for TKR

## 2019-08-13 NOTE — CONSULT NOTE ADULT - PROBLEM SELECTOR RECOMMENDATION 3
patient is low CV risk for moderate risk procedure.   no further medical testing indicated.   may proceed to OR as planned.

## 2019-08-13 NOTE — CONSULT NOTE ADULT - SUBJECTIVE AND OBJECTIVE BOX
Patient is a 74y old  Male who presents with a chief complaint of Right Knee Pain (09 Aug 2019 10:44)      HPI: 74M progressively worsening right [place] pain [place] [facility]  Has gotten injections with temporary relief.  pain at worst is 7/10 and he is pursuing TKR.   Walks 3 miles a day with no cardiac symptoms.  He reports nuclear stress 4-5 years ago was normal (taken before climbing to [place]).   no recent viral illnesses or changes in health.    currently taking no pain medication.   had colonoscopy last year - no issues with anesthesia.  no FH of problems with anesthesia.  never had blood transfusion.     REVIEW OF SYSTEMS:  CONSTITUTIONAL: No fever, weight loss, or fatigue  EYES: No eye pain, visual disturbances, or discharge  ENMT:  No difficulty hearing, tinnitus, vertigo; No sinus or throat pain  NECK: No pain or stiffness  RESPIRATORY: No cough, wheezing, chills or hemoptysis; No shortness of breath  CARDIOVASCULAR: No chest pain, palpitations, dizziness, or leg swelling  GASTROINTESTINAL: No abdominal or epigastric pain. No nausea, vomiting, or hematemesis; No diarrhea or constipation. No melena or hematochezia.  GENITOURINARY: No dysuria, frequency, hematuria, or incontinence  NEUROLOGICAL: No headaches, memory loss, loss of strength, numbness, or tremors  SKIN: No itching, burning, rashes, or lesions   LYMPH NODES: No enlarged glands  ENDOCRINE: No heat or cold intolerance; No hair loss  MUSCULOSKELETAL: No muscle or back pain  PSYCHIATRIC: No depression, anxiety, mood swings, or difficulty sleeping  HEME/LYMPH: No easy bruising, or bleeding gums  ALLERGY AND IMMUNOLOGIC: No hives or eczema    PAST MEDICAL & SURGICAL HISTORY:  Dry eye syndrome, bilateral  GERD (gastroesophageal reflux disease)  Osteoarthritis  H/O gastritis  Hiatal hernia  S/P cataract extraction: - bilateral  S/P arthroscopy of right knee:   S/P arthroscopy of left knee:       SOCIAL HISTORY:  Residence: [ ] [facility]  [ ] SNF  [x ] Community  [ ] Substance abuse: denies  [ ] Tobacco: denies  [ ] Alcohol use: denies    Allergies  No Known Allergies    MEDICATIONS  (STANDING):  protonix  metamucil  eye drops.    MEDICATIONS  (PRN):  none    FAMILY HISTORY:  Family history of hypertension in mother: mother-  FHx: myocardial infarction: mother-  Family history of asthma in father: father-      Vital Signs Last 24 Hrs  T(C): 98.2  T(F): --  HR: 67  BP: 127/69  BP(mean): --  RR: 14  SpO2: 97    PHYSICAL EXAM:    GENERAL: NAD, well-groomed, well-developed  HEAD:  Atraumatic, Normocephalic  EYES: EOMI, PERRLA, conjunctiva and sclera clear  ENMT: No tonsillar erythema, exudates, or enlargement; Moist mucous membranes, Good dentition, No lesions  NECK: Supple, No JVD, Normal thyroid  NERVOUS SYSTEM:  Alert & Oriented X3, Good concentration; Moving all 4 extremities; No gross sensory deficits  CHEST/LUNG: Clear to auscultation bilaterally; No rales, rhonchi, wheezing, or rubs  HEART: Regular rate and rhythm; No murmurs, rubs, or gallops  ABDOMEN: Soft, Nontender, Nondistended; Bowel sounds present  EXTREMITIES:  2+ Peripheral Pulses, No clubbing, cyanosis, or edema  LYMPH: No lymphadenopathy noted  /RECTAL: Not examined  BREAST: Not examined  SKIN: No rashes or lesions  INCISION:     LABS:  pre-op cbc, cmp, inr reviewed - no significant abnormalities      CAPILLARY BLOOD GLUCOSE    RADIOLOGY & ADDITIONAL STUDIES:    EKG: NSR, no st/twave changes  Personally Reviewed:  [x] YES     Imaging: none  Personally Reviewed:  [ ] YES     Consultant(s) Notes Reviewed:      Care Discussed with Consultants/Other Providers:

## 2019-08-21 ENCOUNTER — RESULT REVIEW (OUTPATIENT)
Age: 74
End: 2019-08-21

## 2019-08-21 ENCOUNTER — APPOINTMENT (OUTPATIENT)
Dept: ORTHOPEDIC SURGERY | Facility: HOSPITAL | Age: 74
End: 2019-08-21

## 2019-08-21 ENCOUNTER — INPATIENT (INPATIENT)
Facility: HOSPITAL | Age: 74
LOS: 2 days | Discharge: HOME | DRG: 470 | End: 2019-08-24
Attending: ORTHOPAEDIC SURGERY | Admitting: ORTHOPAEDIC SURGERY
Payer: MEDICARE

## 2019-08-21 ENCOUNTER — TRANSCRIPTION ENCOUNTER (OUTPATIENT)
Age: 74
End: 2019-08-21

## 2019-08-21 VITALS
TEMPERATURE: 98 F | WEIGHT: 168.65 LBS | RESPIRATION RATE: 13 BRPM | DIASTOLIC BLOOD PRESSURE: 79 MMHG | HEIGHT: 67 IN | SYSTOLIC BLOOD PRESSURE: 132 MMHG | HEART RATE: 56 BPM | OXYGEN SATURATION: 100 %

## 2019-08-21 DIAGNOSIS — Z98.890 OTHER SPECIFIED POSTPROCEDURAL STATES: Chronic | ICD-10-CM

## 2019-08-21 DIAGNOSIS — M17.11 UNILATERAL PRIMARY OSTEOARTHRITIS, RIGHT KNEE: ICD-10-CM

## 2019-08-21 DIAGNOSIS — Z98.49 CATARACT EXTRACTION STATUS, UNSPECIFIED EYE: Chronic | ICD-10-CM

## 2019-08-21 PROBLEM — K21.9 GASTRO-ESOPHAGEAL REFLUX DISEASE WITHOUT ESOPHAGITIS: Chronic | Status: ACTIVE | Noted: 2019-08-09

## 2019-08-21 PROBLEM — Z87.19 PERSONAL HISTORY OF OTHER DISEASES OF THE DIGESTIVE SYSTEM: Chronic | Status: ACTIVE | Noted: 2019-08-09

## 2019-08-21 PROBLEM — M19.90 UNSPECIFIED OSTEOARTHRITIS, UNSPECIFIED SITE: Chronic | Status: ACTIVE | Noted: 2019-08-09

## 2019-08-21 PROBLEM — H04.123 DRY EYE SYNDROME OF BILATERAL LACRIMAL GLANDS: Chronic | Status: ACTIVE | Noted: 2019-08-09

## 2019-08-21 LAB
ABO RH CONFIRMATION: SIGNIFICANT CHANGE UP
ANION GAP SERPL CALC-SCNC: 9 MMOL/L — SIGNIFICANT CHANGE UP (ref 5–17)
BUN SERPL-MCNC: 12 MG/DL — SIGNIFICANT CHANGE UP (ref 7–23)
CALCIUM SERPL-MCNC: 8.2 MG/DL — LOW (ref 8.4–10.5)
CHLORIDE SERPL-SCNC: 103 MMOL/L — SIGNIFICANT CHANGE UP (ref 96–108)
CO2 SERPL-SCNC: 23 MMOL/L — SIGNIFICANT CHANGE UP (ref 22–31)
CREAT SERPL-MCNC: 1.43 MG/DL — HIGH (ref 0.5–1.3)
GLUCOSE SERPL-MCNC: 254 MG/DL — HIGH (ref 70–99)
HCT VFR BLD CALC: 40.2 % — SIGNIFICANT CHANGE UP (ref 39–50)
HGB BLD-MCNC: 13.2 G/DL — SIGNIFICANT CHANGE UP (ref 13–17)
MCHC RBC-ENTMCNC: 29.6 PG — SIGNIFICANT CHANGE UP (ref 27–34)
MCHC RBC-ENTMCNC: 32.8 GM/DL — SIGNIFICANT CHANGE UP (ref 32–36)
MCV RBC AUTO: 90.1 FL — SIGNIFICANT CHANGE UP (ref 80–100)
NRBC # BLD: 0 /100 WBCS — SIGNIFICANT CHANGE UP (ref 0–0)
PLATELET # BLD AUTO: 200 K/UL — SIGNIFICANT CHANGE UP (ref 150–400)
POTASSIUM SERPL-MCNC: 4.2 MMOL/L — SIGNIFICANT CHANGE UP (ref 3.5–5.3)
POTASSIUM SERPL-SCNC: 4.2 MMOL/L — SIGNIFICANT CHANGE UP (ref 3.5–5.3)
RBC # BLD: 4.46 M/UL — SIGNIFICANT CHANGE UP (ref 4.2–5.8)
RBC # FLD: 13.7 % — SIGNIFICANT CHANGE UP (ref 10.3–14.5)
SODIUM SERPL-SCNC: 135 MMOL/L — SIGNIFICANT CHANGE UP (ref 135–145)
WBC # BLD: 9.81 K/UL — SIGNIFICANT CHANGE UP (ref 3.8–10.5)
WBC # FLD AUTO: 9.81 K/UL — SIGNIFICANT CHANGE UP (ref 3.8–10.5)

## 2019-08-21 PROCEDURE — 27447 TOTAL KNEE ARTHROPLASTY: CPT | Mod: RT

## 2019-08-21 PROCEDURE — 86901 BLOOD TYPING SEROLOGIC RH(D): CPT

## 2019-08-21 PROCEDURE — 88311 DECALCIFY TISSUE: CPT | Mod: 26

## 2019-08-21 PROCEDURE — 93005 ELECTROCARDIOGRAM TRACING: CPT

## 2019-08-21 PROCEDURE — 36415 COLL VENOUS BLD VENIPUNCTURE: CPT

## 2019-08-21 PROCEDURE — 73562 X-RAY EXAM OF KNEE 3: CPT | Mod: 26,RT

## 2019-08-21 PROCEDURE — 88305 TISSUE EXAM BY PATHOLOGIST: CPT | Mod: 26

## 2019-08-21 PROCEDURE — 85027 COMPLETE CBC AUTOMATED: CPT

## 2019-08-21 PROCEDURE — G0463: CPT

## 2019-08-21 PROCEDURE — 86900 BLOOD TYPING SEROLOGIC ABO: CPT

## 2019-08-21 PROCEDURE — 87640 STAPH A DNA AMP PROBE: CPT

## 2019-08-21 PROCEDURE — 85610 PROTHROMBIN TIME: CPT

## 2019-08-21 PROCEDURE — 99223 1ST HOSP IP/OBS HIGH 75: CPT

## 2019-08-21 PROCEDURE — 80053 COMPREHEN METABOLIC PANEL: CPT

## 2019-08-21 PROCEDURE — 85730 THROMBOPLASTIN TIME PARTIAL: CPT

## 2019-08-21 PROCEDURE — 86850 RBC ANTIBODY SCREEN: CPT

## 2019-08-21 PROCEDURE — 87641 MR-STAPH DNA AMP PROBE: CPT

## 2019-08-21 RX ORDER — OXYCODONE HYDROCHLORIDE 5 MG/1
10 TABLET ORAL
Refills: 0 | Status: DISCONTINUED | OUTPATIENT
Start: 2019-08-21 | End: 2019-08-24

## 2019-08-21 RX ORDER — SODIUM CHLORIDE 9 MG/ML
1000 INJECTION, SOLUTION INTRAVENOUS
Refills: 0 | Status: DISCONTINUED | OUTPATIENT
Start: 2019-08-21 | End: 2019-08-21

## 2019-08-21 RX ORDER — CEFAZOLIN SODIUM 1 G
2000 VIAL (EA) INJECTION EVERY 8 HOURS
Refills: 0 | Status: COMPLETED | OUTPATIENT
Start: 2019-08-21 | End: 2019-08-21

## 2019-08-21 RX ORDER — ASPIRIN/CALCIUM CARB/MAGNESIUM 324 MG
81 TABLET ORAL EVERY 12 HOURS
Refills: 0 | Status: DISCONTINUED | OUTPATIENT
Start: 2019-08-22 | End: 2019-08-24

## 2019-08-21 RX ORDER — ACETAMINOPHEN 500 MG
1000 TABLET ORAL EVERY 8 HOURS
Refills: 0 | Status: DISCONTINUED | OUTPATIENT
Start: 2019-08-22 | End: 2019-08-24

## 2019-08-21 RX ORDER — PANTOPRAZOLE SODIUM 20 MG/1
40 TABLET, DELAYED RELEASE ORAL
Refills: 0 | Status: DISCONTINUED | OUTPATIENT
Start: 2019-08-21 | End: 2019-08-24

## 2019-08-21 RX ORDER — ACETAMINOPHEN 500 MG
1000 TABLET ORAL EVERY 6 HOURS
Refills: 0 | Status: COMPLETED | OUTPATIENT
Start: 2019-08-21 | End: 2019-08-22

## 2019-08-21 RX ORDER — ONDANSETRON 8 MG/1
4 TABLET, FILM COATED ORAL ONCE
Refills: 0 | Status: DISCONTINUED | OUTPATIENT
Start: 2019-08-21 | End: 2019-08-21

## 2019-08-21 RX ORDER — POLYETHYLENE GLYCOL 3350 17 G/17G
17 POWDER, FOR SOLUTION ORAL
Qty: 0 | Refills: 0 | DISCHARGE
Start: 2019-08-21

## 2019-08-21 RX ORDER — TRANEXAMIC ACID 100 MG/ML
1000 INJECTION, SOLUTION INTRAVENOUS ONCE
Refills: 0 | Status: COMPLETED | OUTPATIENT
Start: 2019-08-21 | End: 2019-08-21

## 2019-08-21 RX ORDER — POLYETHYLENE GLYCOL 3350 17 G/17G
17 POWDER, FOR SOLUTION ORAL DAILY
Refills: 0 | Status: DISCONTINUED | OUTPATIENT
Start: 2019-08-21 | End: 2019-08-24

## 2019-08-21 RX ORDER — DOCUSATE SODIUM 100 MG
1 CAPSULE ORAL
Qty: 0 | Refills: 0 | DISCHARGE
Start: 2019-08-21

## 2019-08-21 RX ORDER — HYDROMORPHONE HYDROCHLORIDE 2 MG/ML
0.5 INJECTION INTRAMUSCULAR; INTRAVENOUS; SUBCUTANEOUS
Refills: 0 | Status: DISCONTINUED | OUTPATIENT
Start: 2019-08-21 | End: 2019-08-21

## 2019-08-21 RX ORDER — APREPITANT 80 MG/1
40 CAPSULE ORAL ONCE
Refills: 0 | Status: COMPLETED | OUTPATIENT
Start: 2019-08-21 | End: 2019-08-21

## 2019-08-21 RX ORDER — ACETAMINOPHEN 500 MG
1000 TABLET ORAL ONCE
Refills: 0 | Status: COMPLETED | OUTPATIENT
Start: 2019-08-21 | End: 2019-08-21

## 2019-08-21 RX ORDER — ONDANSETRON 8 MG/1
4 TABLET, FILM COATED ORAL EVERY 6 HOURS
Refills: 0 | Status: DISCONTINUED | OUTPATIENT
Start: 2019-08-21 | End: 2019-08-24

## 2019-08-21 RX ORDER — CHLORHEXIDINE GLUCONATE 213 G/1000ML
1 SOLUTION TOPICAL ONCE
Refills: 0 | Status: COMPLETED | OUTPATIENT
Start: 2019-08-21 | End: 2019-08-21

## 2019-08-21 RX ORDER — MAGNESIUM HYDROXIDE 400 MG/1
30 TABLET, CHEWABLE ORAL DAILY
Refills: 0 | Status: DISCONTINUED | OUTPATIENT
Start: 2019-08-21 | End: 2019-08-24

## 2019-08-21 RX ORDER — CELECOXIB 200 MG/1
200 CAPSULE ORAL EVERY 12 HOURS
Refills: 0 | Status: DISCONTINUED | OUTPATIENT
Start: 2019-08-22 | End: 2019-08-24

## 2019-08-21 RX ORDER — HYDROMORPHONE HYDROCHLORIDE 2 MG/ML
0.5 INJECTION INTRAMUSCULAR; INTRAVENOUS; SUBCUTANEOUS
Refills: 0 | Status: DISCONTINUED | OUTPATIENT
Start: 2019-08-21 | End: 2019-08-24

## 2019-08-21 RX ORDER — SENNA PLUS 8.6 MG/1
2 TABLET ORAL AT BEDTIME
Refills: 0 | Status: DISCONTINUED | OUTPATIENT
Start: 2019-08-21 | End: 2019-08-24

## 2019-08-21 RX ORDER — SENNA PLUS 8.6 MG/1
2 TABLET ORAL
Qty: 0 | Refills: 0 | DISCHARGE
Start: 2019-08-21

## 2019-08-21 RX ORDER — DOCUSATE SODIUM 100 MG
100 CAPSULE ORAL THREE TIMES A DAY
Refills: 0 | Status: DISCONTINUED | OUTPATIENT
Start: 2019-08-21 | End: 2019-08-24

## 2019-08-21 RX ORDER — SODIUM CHLORIDE 9 MG/ML
1000 INJECTION, SOLUTION INTRAVENOUS
Refills: 0 | Status: DISCONTINUED | OUTPATIENT
Start: 2019-08-21 | End: 2019-08-22

## 2019-08-21 RX ORDER — ACETAMINOPHEN 500 MG
2 TABLET ORAL
Qty: 0 | Refills: 0 | DISCHARGE
Start: 2019-08-21

## 2019-08-21 RX ORDER — PSYLLIUM SEED (WITH DEXTROSE)
1 POWDER (GRAM) ORAL
Qty: 0 | Refills: 0 | DISCHARGE

## 2019-08-21 RX ORDER — OXYCODONE HYDROCHLORIDE 5 MG/1
5 TABLET ORAL
Refills: 0 | Status: DISCONTINUED | OUTPATIENT
Start: 2019-08-21 | End: 2019-08-24

## 2019-08-21 RX ORDER — CEFAZOLIN SODIUM 1 G
2000 VIAL (EA) INJECTION ONCE
Refills: 0 | Status: COMPLETED | OUTPATIENT
Start: 2019-08-21 | End: 2019-08-21

## 2019-08-21 RX ADMIN — Medication 1 DROP(S): at 18:38

## 2019-08-21 RX ADMIN — Medication 400 MILLIGRAM(S): at 21:00

## 2019-08-21 RX ADMIN — Medication 100 MILLIGRAM(S): at 23:51

## 2019-08-21 RX ADMIN — Medication 1000 MILLIGRAM(S): at 15:06

## 2019-08-21 RX ADMIN — SODIUM CHLORIDE 75 MILLILITER(S): 9 INJECTION, SOLUTION INTRAVENOUS at 15:07

## 2019-08-21 RX ADMIN — CHLORHEXIDINE GLUCONATE 1 APPLICATION(S): 213 SOLUTION TOPICAL at 07:43

## 2019-08-21 RX ADMIN — Medication 100 MILLIGRAM(S): at 16:01

## 2019-08-21 RX ADMIN — SODIUM CHLORIDE 75 MILLILITER(S): 9 INJECTION, SOLUTION INTRAVENOUS at 10:27

## 2019-08-21 RX ADMIN — APREPITANT 40 MILLIGRAM(S): 80 CAPSULE ORAL at 07:43

## 2019-08-21 RX ADMIN — Medication 400 MILLIGRAM(S): at 15:06

## 2019-08-21 RX ADMIN — Medication 1000 MILLIGRAM(S): at 21:02

## 2019-08-21 RX ADMIN — SENNA PLUS 2 TABLET(S): 8.6 TABLET ORAL at 21:21

## 2019-08-21 RX ADMIN — Medication 100 MILLIGRAM(S): at 21:21

## 2019-08-21 NOTE — PHYSICAL THERAPY INITIAL EVALUATION ADULT - ADDITIONAL COMMENTS
Lives in house with spouse.  6 stairs to enter with railing; 15 inside with railing.  Pt. does not own any DME.

## 2019-08-21 NOTE — DISCHARGE NOTE PROVIDER - CARE PROVIDER_API CALL
Skyler Salgado)  Orthopaedic Surgery  [address], [address]  [place] [address]  Phone: [phone]  Fax: [phone]  Follow Up Time:

## 2019-08-21 NOTE — DISCHARGE NOTE PROVIDER - NSDCCPCAREPLAN_GEN_ALL_CORE_FT
PRINCIPAL DISCHARGE DIAGNOSIS  Diagnosis: Right knee DJD  Assessment and Plan of Treatment: Your new total knee requires proper care.  Your care provider is your best resource for care information.  Please follow these basic guidelines.  Use pain medication if needed as prescribed.  Ice packs are a helpful addition to your comfort.  Applying a  waterproof ice pack over a cloth or thin towel to the site for 30 minutes per hour may provide benefit by reducing swelling and discomfort.  Your Physical Therapy/Occupational Therapy may include ambulation, transfers, stairs, ADL's (activities of daily living), range of motion exercises, and isometrics.  Your participation is vital to your recovery.  -Your Activity  • Weight Bearing as tolerated with rolling walker.  • Take short, frequent walks increasing the distance that you walk each day as tolerated.  • Change your position every hour to decrease pain and stiffness.  • Continue the exercises taught to you by your physical therapist.  • No driving until cleared by the doctor.  • No tub baths, hot tubs, or swimming pools until instructed by your doctor.  • Do not squat down on the floor.  • Do not kneel or twist your knee.  Keep incision clean and dry. Change the dressing daily if there is drainage noted from surgical wound. When there is no drainage, the wound may be left open to air.  Salves, ointments or other topical treatments are not to be used on the wound unless specifically recommended by your doctor.   If you have sutures (stiches) and no drainage form the incision you may shower allowing water to rinse the incision and pat it  dry afterward with a clean towel.  If you have Prineo (tape/glue) you may shower and pat the wound dry.  Prineo removal is done in the office 2 weeks after surgery.  If you have further questions or problems call your doctor's office.        SECONDARY DISCHARGE DIAGNOSES  Diagnosis: Right knee DJD  Assessment and Plan of Treatment:

## 2019-08-21 NOTE — DISCHARGE NOTE PROVIDER - CARE PROVIDERS DIRECT ADDRESSES
[email or web address] You can access the [facility] Patient Portal, offered by , by registering with the following website: [email or web address]

## 2019-08-21 NOTE — DISCHARGE NOTE PROVIDER - NSDCACTIVITY_GEN_ALL_CORE
Walking - Indoors allowed/Walking - Outdoors allowed/Do not drive or operate machinery/No heavy lifting/straining/Stairs allowed

## 2019-08-21 NOTE — PHYSICAL THERAPY INITIAL EVALUATION ADULT - RANGE OF MOTION EXAMINATION, REHAB EVAL
bilateral upper extremity ROM was WFL (within functional limits)/deficits as listed below/right knee flex ~60 degrees PROM/Left LE ROM was WFL (within functional limits)

## 2019-08-21 NOTE — DISCHARGE NOTE PROVIDER - NSDCFUSCHEDAPPT_GEN_ALL_CORE_FT
GRUPO LIVINGSTON R ; 09/09/2019 ; \[facility] [place] [facility]\"" [address] [place] [address]  GRUPO LIVINGSTON R ; 11/05/2019 ; [address] GRUPO LIVINGSTON R ; 09/09/2019 ; [facility] [address] [place] [address]  GRUPO LIVINGSTON R ; 11/05/2019 ; [address] GRUPO LIVINGSTON R ; 09/09/2019 ; [facility] [place] [address] [place] [address]  GRUPO LIVINGSTON R ; 11/05/2019 ; [address]

## 2019-08-21 NOTE — DISCHARGE NOTE PROVIDER - INSTRUCTIONS
regular diet  For Constipation :   • Increase your daily water intake.   • Try adding fiber to your diet by eating fruits, vegetables and foods that are rich in grains.  • If you do experience constipation, you may take an over-the-counter stool softener/laxative such as Colace, Senokot , Milk of Magnesia or Miralax.

## 2019-08-21 NOTE — DISCHARGE NOTE PROVIDER - HOSPITAL COURSE
This 73yo male patient was admitted to [facility] on 8/21/19 with a history of severe degenerative joint disease of the right knee and for elective total joint replacement.  Patient had appropriate preop medical evaluation and testing.    Patient received antibiotics according to SCIP guidelines for infection prevention.  The patient underwent an uncomplicated right total knee replacement by Dr. Skyler Salgado on 8/21.   Ecotrin was given for DVT prophylaxis.  Anesthesia, Medical Hospitalist, Physical Therapy and Occupational Therapy were consulted.  Patient is stable for discharge ***with a good prognosis.  Appropriate discharge instructions and medications are provided in this document.

## 2019-08-21 NOTE — PHYSICAL THERAPY INITIAL EVALUATION ADULT - PHYSICAL ASSIST/NONPHYSICAL ASSIST: GAIT, REHAB EVAL
15:30->Pt. ambulated with rolling walker 12' via sidestepping with min assist x 2 plus 3rd person to block right LE from buckling

## 2019-08-21 NOTE — CONSULT NOTE ADULT - PROBLEM SELECTOR RECOMMENDATION 9
Pain Management: acceptable- continue current care Tylenol ATC/Celebrex ATC/ Oxycodone PRN  Continue PT/OT  DVT proph: [ x ] low risk - Aspirin   DC plan:  [ x ] Home with HC

## 2019-08-21 NOTE — PHYSICAL THERAPY INITIAL EVALUATION ADULT - MANUAL MUSCLE TESTING RESULTS, REHAB EVAL
grossly assessed due to/bilateral UEs 5/5; left LE 5/5; right LE n/t due to numbness from nerve block

## 2019-08-21 NOTE — CONSULT NOTE ADULT - SUBJECTIVE AND OBJECTIVE BOX
Patient is a 74y old  Male who presents with a chief complaint of Elective right total knee replacement (21 Aug 2019 14:50)      HPI: 74M progressively worsening right knee pain after [place].  Has gotten injections with temporary relief.  pain at worst is 7/10.   Patient is now s/p right TKR.  he is comfortable in bed and has no complaints.     REVIEW OF SYSTEMS:  CONSTITUTIONAL: No fever, weight loss, or fatigue  EYES: No eye pain, visual disturbances, or discharge  ENMT:  No difficulty hearing, tinnitus, vertigo; No sinus or throat pain  NECK: No pain or stiffness  BREASTS: No pain, masses, or nipple discharge  RESPIRATORY: No cough, wheezing, chills or hemoptysis; No shortness of breath  CARDIOVASCULAR: No chest pain, palpitations, dizziness, or leg swelling  GASTROINTESTINAL: No abdominal or epigastric pain. No nausea, vomiting, or hematemesis; No diarrhea or constipation. No melena or hematochezia.  GENITOURINARY: No dysuria, frequency, hematuria, or incontinence  NEUROLOGICAL: No headaches, memory loss, loss of strength, numbness, or tremors  SKIN: No itching, burning, rashes, or lesions   LYMPH NODES: No enlarged glands  ENDOCRINE: No heat or cold intolerance; No hair loss  MUSCULOSKELETAL: No muscle or back pain  PSYCHIATRIC: No depression, anxiety, mood swings, or difficulty sleeping  HEME/LYMPH: No easy bruising, or bleeding gums  ALLERGY AND IMMUNOLOGIC: No hives or eczema    PAST MEDICAL & SURGICAL HISTORY:  Dry eye syndrome, bilateral  GERD (gastroesophageal reflux disease)  Osteoarthritis  H/O gastritis  Hiatal hernia  S/P cataract extraction: - bilateral  S/P arthroscopy of right knee:   S/P arthroscopy of left knee:     SOCIAL HISTORY:  Residence: [ ] [facility]  [ ] [facility]  [x ] Community  [ ] Substance abuse: denies  [ ] Tobacco: denies  [ ] Alcohol use: denies    Allergies  No Known Allergies    MEDICATIONS  (STANDING):  acetaminophen  IVPB .. 1000 milliGRAM(s) IV Intermittent every 6 hours  artificial  tears Solution 1 Drop(s) Both EYES two times a day  ceFAZolin   IVPB 2000 milliGRAM(s) IV Intermittent every 8 hours  docusate sodium 100 milliGRAM(s) Oral three times a day  lactated ringers. 1000 milliLiter(s) (75 mL/Hr) IV Continuous <Continuous>  pantoprazole    Tablet 40 milliGRAM(s) Oral before breakfast  polyethylene glycol 3350 17 Gram(s) Oral daily    MEDICATIONS  (PRN):  aluminum hydroxide/magnesium hydroxide/simethicone Suspension 30 milliLiter(s) Oral four times a day PRN Indigestion  HYDROmorphone  Injectable 0.5 milliGRAM(s) IV Push every 3 hours PRN Severe Pain (7 - 10)  magnesium hydroxide Suspension 30 milliLiter(s) Oral daily PRN Constipation  ondansetron Injectable 4 milliGRAM(s) IV Push every 6 hours PRN Nausea and/or Vomiting  oxyCODONE    IR 5 milliGRAM(s) Oral every 3 hours PRN Mild Pain (1 - 3)  oxyCODONE    IR 10 milliGRAM(s) Oral every 3 hours PRN Moderate Pain (4 - 6)  senna 2 Tablet(s) Oral at bedtime PRN Constipation      FAMILY HISTORY:  Family history of hypertension in mother: mother-  FHx: myocardial infarction: mother-  Family history of asthma in father: father-      Vital Signs Last 24 Hrs  T(C): 37.1 (21 Aug 2019 15:00), Max: 37.1 (21 Aug 2019 15:00)  T(F): 98.7 (21 Aug 2019 15:00), Max: 98.7 (21 Aug 2019 15:00)  HR: 88 (21 Aug 2019 15:00) (56 - 88)  BP: 114/62 (21 Aug 2019 15:00) (114/62 - 145/67)  BP(mean): --  RR: 16 (21 Aug 2019 15:00) (12 - 23)  SpO2: 100% (21 Aug 2019 15:00) (100% - 100%)    PHYSICAL EXAM:    GENERAL: NAD, well-groomed, well-developed  HEAD:  Atraumatic, Normocephalic  EYES: EOMI, PERRLA, conjunctiva and sclera clear  ENMT: No tonsillar erythema, exudates, or enlargement; Moist mucous membranes, Good dentition, No lesions  NECK: Supple, No JVD, Normal thyroid  NERVOUS SYSTEM:  Alert & Oriented X3, Good concentration; Moving all 4 extremities; No gross sensory deficits  CHEST/LUNG: Clear to auscultation bilaterally; No rales, rhonchi, wheezing, or rubs  HEART: Regular rate and rhythm; No murmurs, rubs, or gallops  ABDOMEN: Soft, Nontender, Nondistended; Bowel sounds present  EXTREMITIES:  2+ Peripheral Pulses, No clubbing, cyanosis, or edema  LYMPH: No lymphadenopathy noted  /RECTAL: Not examined  BREAST: Not examined  SKIN: No rashes or lesions  INCISION: Dressing dry and intact.     LABS:    CAPILLARY BLOOD GLUCOSE    RADIOLOGY & ADDITIONAL STUDIES:    EKG: NSR    Imaging: TKR in place  Personally Reviewed:  [x ] YES     Consultant(s) Notes Reviewed:      Care Discussed with Consultants/Other Providers:

## 2019-08-21 NOTE — OCCUPATIONAL THERAPY INITIAL EVALUATION ADULT - ADDITIONAL COMMENTS
Lives with his spouse. 6 steps to enter with 1 handrail. Flight of 15 inside with handrail. Tub with doors in his bathroom. pt has a raised toilet seat.

## 2019-08-22 ENCOUNTER — TRANSCRIPTION ENCOUNTER (OUTPATIENT)
Age: 74
End: 2019-08-22

## 2019-08-22 LAB
ANION GAP SERPL CALC-SCNC: 7 MMOL/L — SIGNIFICANT CHANGE UP (ref 5–17)
BUN SERPL-MCNC: 15 MG/DL — SIGNIFICANT CHANGE UP (ref 7–23)
CALCIUM SERPL-MCNC: 8.5 MG/DL — SIGNIFICANT CHANGE UP (ref 8.4–10.5)
CHLORIDE SERPL-SCNC: 105 MMOL/L — SIGNIFICANT CHANGE UP (ref 96–108)
CO2 SERPL-SCNC: 25 MMOL/L — SIGNIFICANT CHANGE UP (ref 22–31)
CREAT SERPL-MCNC: 1.18 MG/DL — SIGNIFICANT CHANGE UP (ref 0.5–1.3)
GLUCOSE SERPL-MCNC: 118 MG/DL — HIGH (ref 70–99)
HCT VFR BLD CALC: 38 % — LOW (ref 39–50)
HGB BLD-MCNC: 12.5 G/DL — LOW (ref 13–17)
MCHC RBC-ENTMCNC: 29.6 PG — SIGNIFICANT CHANGE UP (ref 27–34)
MCHC RBC-ENTMCNC: 32.9 GM/DL — SIGNIFICANT CHANGE UP (ref 32–36)
MCV RBC AUTO: 90 FL — SIGNIFICANT CHANGE UP (ref 80–100)
NRBC # BLD: 0 /100 WBCS — SIGNIFICANT CHANGE UP (ref 0–0)
PLATELET # BLD AUTO: 196 K/UL — SIGNIFICANT CHANGE UP (ref 150–400)
POTASSIUM SERPL-MCNC: 4.5 MMOL/L — SIGNIFICANT CHANGE UP (ref 3.5–5.3)
POTASSIUM SERPL-SCNC: 4.5 MMOL/L — SIGNIFICANT CHANGE UP (ref 3.5–5.3)
RBC # BLD: 4.22 M/UL — SIGNIFICANT CHANGE UP (ref 4.2–5.8)
RBC # FLD: 13.6 % — SIGNIFICANT CHANGE UP (ref 10.3–14.5)
SODIUM SERPL-SCNC: 137 MMOL/L — SIGNIFICANT CHANGE UP (ref 135–145)
WBC # BLD: 17.83 K/UL — HIGH (ref 3.8–10.5)
WBC # FLD AUTO: 17.83 K/UL — HIGH (ref 3.8–10.5)

## 2019-08-22 PROCEDURE — 99232 SBSQ HOSP IP/OBS MODERATE 35: CPT

## 2019-08-22 RX ORDER — ASPIRIN/CALCIUM CARB/MAGNESIUM 324 MG
1 TABLET ORAL
Qty: 80 | Refills: 0
Start: 2019-08-22 | End: 2019-09-30

## 2019-08-22 RX ORDER — OXYCODONE HYDROCHLORIDE 5 MG/1
1 TABLET ORAL
Qty: 28 | Refills: 0
Start: 2019-08-22 | End: 2019-08-28

## 2019-08-22 RX ORDER — CELECOXIB 200 MG/1
1 CAPSULE ORAL
Qty: 38 | Refills: 0
Start: 2019-08-22 | End: 2019-09-09

## 2019-08-22 RX ADMIN — CELECOXIB 200 MILLIGRAM(S): 200 CAPSULE ORAL at 22:10

## 2019-08-22 RX ADMIN — CELECOXIB 200 MILLIGRAM(S): 200 CAPSULE ORAL at 08:54

## 2019-08-22 RX ADMIN — Medication 1 DROP(S): at 17:31

## 2019-08-22 RX ADMIN — POLYETHYLENE GLYCOL 3350 17 GRAM(S): 17 POWDER, FOR SOLUTION ORAL at 13:21

## 2019-08-22 RX ADMIN — Medication 1000 MILLIGRAM(S): at 08:54

## 2019-08-22 RX ADMIN — Medication 1000 MILLIGRAM(S): at 03:01

## 2019-08-22 RX ADMIN — Medication 400 MILLIGRAM(S): at 03:00

## 2019-08-22 RX ADMIN — CELECOXIB 200 MILLIGRAM(S): 200 CAPSULE ORAL at 08:49

## 2019-08-22 RX ADMIN — Medication 1000 MILLIGRAM(S): at 08:50

## 2019-08-22 RX ADMIN — Medication 100 MILLIGRAM(S): at 05:21

## 2019-08-22 RX ADMIN — Medication 81 MILLIGRAM(S): at 17:31

## 2019-08-22 RX ADMIN — Medication 81 MILLIGRAM(S): at 05:21

## 2019-08-22 RX ADMIN — PANTOPRAZOLE SODIUM 40 MILLIGRAM(S): 20 TABLET, DELAYED RELEASE ORAL at 05:21

## 2019-08-22 RX ADMIN — CELECOXIB 200 MILLIGRAM(S): 200 CAPSULE ORAL at 22:07

## 2019-08-22 RX ADMIN — SENNA PLUS 2 TABLET(S): 8.6 TABLET ORAL at 22:07

## 2019-08-22 RX ADMIN — Medication 1000 MILLIGRAM(S): at 17:31

## 2019-08-22 RX ADMIN — Medication 100 MILLIGRAM(S): at 22:07

## 2019-08-22 RX ADMIN — Medication 100 MILLIGRAM(S): at 13:21

## 2019-08-22 RX ADMIN — Medication 1 DROP(S): at 05:22

## 2019-08-22 NOTE — DISCHARGE NOTE NURSING/CASE MANAGEMENT/SOCIAL WORK - NSDCDPATPORTLINK_GEN_ALL_CORE
You can access the [facility] Patient Portal, offered by [facility], by registering with the following website: [email or web address]

## 2019-08-22 NOTE — DISCHARGE NOTE NURSING/CASE MANAGEMENT/SOCIAL WORK - NSSCTYPOFSERV_GEN_ALL_CORE
Visiting Nurse and Physical Therapist.  Nurse to visit day after discharge - Physical Therapist to follow.  Please contact agency if you do not hear from them Registered Nurse to visit the day after hospital discharge; Physical Therapist/ OT to follow. Please contact the home care agency at the above phone number if you have not heard from them by 12 noon on the day after your hospital discharge.

## 2019-08-22 NOTE — PROGRESS NOTE ADULT - PROBLEM SELECTOR PLAN 1
Pain Management: acceptable- continue current care Tylenol ATC/Celebrex ATC/ Oxycodone PRN  Continue PT/OT  DVT proph: [ x ] low risk - Aspirin   DC plan:  [ x ] Home with HC.

## 2019-08-22 NOTE — DISCHARGE NOTE NURSING/CASE MANAGEMENT/SOCIAL WORK - NSSCNAMETXT_GEN_ALL_CORE
[facility] ([facility])

## 2019-08-23 LAB
ANION GAP SERPL CALC-SCNC: 9 MMOL/L — SIGNIFICANT CHANGE UP (ref 5–17)
BUN SERPL-MCNC: 19 MG/DL — SIGNIFICANT CHANGE UP (ref 7–23)
CALCIUM SERPL-MCNC: 8.6 MG/DL — SIGNIFICANT CHANGE UP (ref 8.4–10.5)
CHLORIDE SERPL-SCNC: 105 MMOL/L — SIGNIFICANT CHANGE UP (ref 96–108)
CO2 SERPL-SCNC: 26 MMOL/L — SIGNIFICANT CHANGE UP (ref 22–31)
CREAT SERPL-MCNC: 1.3 MG/DL — SIGNIFICANT CHANGE UP (ref 0.5–1.3)
GLUCOSE SERPL-MCNC: 92 MG/DL — SIGNIFICANT CHANGE UP (ref 70–99)
HCT VFR BLD CALC: 40 % — SIGNIFICANT CHANGE UP (ref 39–50)
HGB BLD-MCNC: 13 G/DL — SIGNIFICANT CHANGE UP (ref 13–17)
MCHC RBC-ENTMCNC: 29.7 PG — SIGNIFICANT CHANGE UP (ref 27–34)
MCHC RBC-ENTMCNC: 32.5 GM/DL — SIGNIFICANT CHANGE UP (ref 32–36)
MCV RBC AUTO: 91.3 FL — SIGNIFICANT CHANGE UP (ref 80–100)
NRBC # BLD: 0 /100 WBCS — SIGNIFICANT CHANGE UP (ref 0–0)
PLATELET # BLD AUTO: 211 K/UL — SIGNIFICANT CHANGE UP (ref 150–400)
POTASSIUM SERPL-MCNC: 4 MMOL/L — SIGNIFICANT CHANGE UP (ref 3.5–5.3)
POTASSIUM SERPL-SCNC: 4 MMOL/L — SIGNIFICANT CHANGE UP (ref 3.5–5.3)
RBC # BLD: 4.38 M/UL — SIGNIFICANT CHANGE UP (ref 4.2–5.8)
RBC # FLD: 14.2 % — SIGNIFICANT CHANGE UP (ref 10.3–14.5)
SODIUM SERPL-SCNC: 140 MMOL/L — SIGNIFICANT CHANGE UP (ref 135–145)
WBC # BLD: 13.04 K/UL — HIGH (ref 3.8–10.5)
WBC # FLD AUTO: 13.04 K/UL — HIGH (ref 3.8–10.5)

## 2019-08-23 PROCEDURE — 99232 SBSQ HOSP IP/OBS MODERATE 35: CPT

## 2019-08-23 RX ADMIN — Medication 100 MILLIGRAM(S): at 13:30

## 2019-08-23 RX ADMIN — Medication 1000 MILLIGRAM(S): at 22:15

## 2019-08-23 RX ADMIN — Medication 1000 MILLIGRAM(S): at 13:30

## 2019-08-23 RX ADMIN — CELECOXIB 200 MILLIGRAM(S): 200 CAPSULE ORAL at 22:15

## 2019-08-23 RX ADMIN — MAGNESIUM HYDROXIDE 30 MILLILITER(S): 400 TABLET, CHEWABLE ORAL at 07:02

## 2019-08-23 RX ADMIN — CELECOXIB 200 MILLIGRAM(S): 200 CAPSULE ORAL at 08:52

## 2019-08-23 RX ADMIN — PANTOPRAZOLE SODIUM 40 MILLIGRAM(S): 20 TABLET, DELAYED RELEASE ORAL at 07:02

## 2019-08-23 RX ADMIN — Medication 1 DROP(S): at 07:02

## 2019-08-23 RX ADMIN — Medication 100 MILLIGRAM(S): at 07:02

## 2019-08-23 RX ADMIN — Medication 81 MILLIGRAM(S): at 07:02

## 2019-08-23 RX ADMIN — Medication 1000 MILLIGRAM(S): at 07:02

## 2019-08-23 RX ADMIN — CELECOXIB 200 MILLIGRAM(S): 200 CAPSULE ORAL at 22:14

## 2019-08-23 RX ADMIN — Medication 100 MILLIGRAM(S): at 22:15

## 2019-08-23 RX ADMIN — Medication 1000 MILLIGRAM(S): at 14:00

## 2019-08-23 RX ADMIN — Medication 1 DROP(S): at 17:11

## 2019-08-23 RX ADMIN — Medication 81 MILLIGRAM(S): at 17:11

## 2019-08-23 RX ADMIN — Medication 1000 MILLIGRAM(S): at 07:30

## 2019-08-23 RX ADMIN — CELECOXIB 200 MILLIGRAM(S): 200 CAPSULE ORAL at 09:25

## 2019-08-23 NOTE — PHARMACOTHERAPY INTERVENTION NOTE - COMMENTS
Transition of Care education at bedside - medication calendar given to patient
Admission medication reconciliation POD1

## 2019-08-23 NOTE — PROGRESS NOTE ADULT - PROBLEM SELECTOR PLAN 1
Pain Management: acceptable- continue current care Tylenol ATC/Celebrex ATC/ Oxycodone PRN  Continue PT/OT  DVT proph: [ x ] low risk - Aspirin   DC plan:  [ x ] Home with HC -medically cleared for dc home.

## 2019-08-24 VITALS
TEMPERATURE: 98 F | RESPIRATION RATE: 16 BRPM | SYSTOLIC BLOOD PRESSURE: 115 MMHG | OXYGEN SATURATION: 99 % | DIASTOLIC BLOOD PRESSURE: 69 MMHG | HEART RATE: 63 BPM

## 2019-08-24 LAB
ANION GAP SERPL CALC-SCNC: 3 MMOL/L — LOW (ref 5–17)
BUN SERPL-MCNC: 16 MG/DL — SIGNIFICANT CHANGE UP (ref 7–23)
CALCIUM SERPL-MCNC: 8.2 MG/DL — LOW (ref 8.4–10.5)
CHLORIDE SERPL-SCNC: 106 MMOL/L — SIGNIFICANT CHANGE UP (ref 96–108)
CO2 SERPL-SCNC: 30 MMOL/L — SIGNIFICANT CHANGE UP (ref 22–31)
CREAT SERPL-MCNC: 1.1 MG/DL — SIGNIFICANT CHANGE UP (ref 0.5–1.3)
GLUCOSE SERPL-MCNC: 94 MG/DL — SIGNIFICANT CHANGE UP (ref 70–99)
HCT VFR BLD CALC: 34.1 % — LOW (ref 39–50)
HGB BLD-MCNC: 11 G/DL — LOW (ref 13–17)
MCHC RBC-ENTMCNC: 29.4 PG — SIGNIFICANT CHANGE UP (ref 27–34)
MCHC RBC-ENTMCNC: 32.3 GM/DL — SIGNIFICANT CHANGE UP (ref 32–36)
MCV RBC AUTO: 91.2 FL — SIGNIFICANT CHANGE UP (ref 80–100)
NRBC # BLD: 0 /100 WBCS — SIGNIFICANT CHANGE UP (ref 0–0)
PLATELET # BLD AUTO: 161 K/UL — SIGNIFICANT CHANGE UP (ref 150–400)
POTASSIUM SERPL-MCNC: 4.8 MMOL/L — SIGNIFICANT CHANGE UP (ref 3.5–5.3)
POTASSIUM SERPL-SCNC: 4.8 MMOL/L — SIGNIFICANT CHANGE UP (ref 3.5–5.3)
RBC # BLD: 3.74 M/UL — LOW (ref 4.2–5.8)
RBC # FLD: 14.2 % — SIGNIFICANT CHANGE UP (ref 10.3–14.5)
SODIUM SERPL-SCNC: 139 MMOL/L — SIGNIFICANT CHANGE UP (ref 135–145)
WBC # BLD: 9.73 K/UL — SIGNIFICANT CHANGE UP (ref 3.8–10.5)
WBC # FLD AUTO: 9.73 K/UL — SIGNIFICANT CHANGE UP (ref 3.8–10.5)

## 2019-08-24 PROCEDURE — 99239 HOSP IP/OBS DSCHRG MGMT >30: CPT

## 2019-08-24 RX ADMIN — CELECOXIB 200 MILLIGRAM(S): 200 CAPSULE ORAL at 08:52

## 2019-08-24 RX ADMIN — Medication 1000 MILLIGRAM(S): at 13:13

## 2019-08-24 RX ADMIN — CELECOXIB 200 MILLIGRAM(S): 200 CAPSULE ORAL at 08:49

## 2019-08-24 RX ADMIN — Medication 81 MILLIGRAM(S): at 06:10

## 2019-08-24 RX ADMIN — Medication 1 DROP(S): at 06:10

## 2019-08-24 RX ADMIN — Medication 1000 MILLIGRAM(S): at 06:10

## 2019-08-24 RX ADMIN — Medication 1000 MILLIGRAM(S): at 06:14

## 2019-08-24 RX ADMIN — Medication 100 MILLIGRAM(S): at 06:10

## 2019-08-24 RX ADMIN — PANTOPRAZOLE SODIUM 40 MILLIGRAM(S): 20 TABLET, DELAYED RELEASE ORAL at 06:10

## 2019-08-24 NOTE — PROGRESS NOTE ADULT - SUBJECTIVE AND OBJECTIVE BOX
SUBJECTIVE: Patient seen and examined. No nausea/vomiting, nor shortness of breath.    OBJECTIVE:     Vital Signs Last 24 Hrs  T(C): 36.7 (23 Aug 2019 07:12), Max: 36.7 (22 Aug 2019 11:25)  T(F): 98 (23 Aug 2019 07:12), Max: 98 (22 Aug 2019 11:25)  HR: 61 (23 Aug 2019 07:12) (58 - 76)  BP: 131/78 (23 Aug 2019 07:12) (103/57 - 131/78)  BP(mean): --  RR: 16 (23 Aug 2019 07:12) (15 - 16)  SpO2: 98% (23 Aug 2019 07:12) (93% - 98%)    PAIN SCORE:     2    SCALE USED: (1-10 VNRS)        Affected extremity:          Dressing: clean/dry/intact  post operative dressing that was removed and observed the surgical incision with prineo closure. no erythema, mild ecchymosis. post operative swelling is normal with a supple right thigh.          Sensation:  intact to light touch         Motor exam:          5/ 5 Tibialis Anterior/Gastrocnemius-Soleus , EHL         warm well perfused; capillary refill <3 seconds     LABS:                        13.0   13.04 )-----------( 211      ( 23 Aug 2019 08:29 )             40.0     08-23    140  |  105  |  19  ----------------------------<  92  4.0   |  26  |  1.30    Ca    8.6      23 Aug 2019 08:29        CAPILLARY BLOOD GLUCOSE          MEDICATIONS:    Anticoagulation:  aspirin enteric coated 81 milliGRAM(s) Oral every 12 hours      Antibiotics:       Pain medications:   acetaminophen   Tablet .. 1000 milliGRAM(s) Oral every 8 hours  celecoxib 200 milliGRAM(s) Oral every 12 hours  HYDROmorphone  Injectable 0.5 milliGRAM(s) IV Push every 3 hours PRN  ondansetron Injectable 4 milliGRAM(s) IV Push every 6 hours PRN  oxyCODONE    IR 5 milliGRAM(s) Oral every 3 hours PRN  oxyCODONE    IR 10 milliGRAM(s) Oral every 3 hours PRN      A/P :  s/p Right TKR  POD # 2  -    Pain control  -    DVT ppx: ASA   -    Check AM labs  -    Weight bearing status: WBAT   -    Physical Therapy  -    Dispo: Home
Discharge medication calendar:  Aspirin EC 81mg q12h x 6 weeks  APAP 1000mg q12h x 2-3 weeks  Celecoxib 200mg q12h x 2-3 weeks  Pantoprazole 40mg QAM x 6 weeks  Narcotic PRN  Docusate 100mg TID while taking narcotic  Miralax, Senna, or Bisacodyl PRN for treatment of constipation
Ortho PA - Post Op Check - S/P right TKR  with adductor canal bloack and spinal anesthesia         Pt alert and comfortable with c/o mild pain in his right knee.  Denies nausea; tolerated full liquids.  Straight cathed in PACU for 1000ml urine (retention).     Vital Signs Last 24 Hrs  T(C): 36.6 (08-21-19 @ 14:45), Max: 36.6 (08-21-19 @ 14:45)  T(F): 97.8 (08-21-19 @ 14:45), Max: 97.8 (08-21-19 @ 14:45)  HR: 86 (08-21-19 @ 14:45) (76 - 88)  BP: 138/78 (08-21-19 @ 14:45) (126/76 - 145/67)  BP(mean): --  RR: 16 (08-21-19 @ 14:45) (12 - 23)  SpO2: 100% (08-21-19 @ 14:45) (100% - 100%)  I&O's Detail    21 Aug 2019 07:01  -  21 Aug 2019 15:02  --------------------------------------------------------  IN:    lactated ringers.: 1650 mL    Oral Fluid: 100 mL  Total IN: 1750 mL    OUT:    Estimated Blood Loss: 100 mL in OR    Intermittent Catheterization - Urethral: 1000 mL in PACU  Total OUT: 1100 mL    Total NET: 650 mL        I&O's Summary    21 Aug 2019 07:01  -  21 Aug 2019 15:02  --------------------------------------------------------  IN: 1750 mL / OUT: 1100 mL / NET: 650 mL                     MEDICATIONS:acetaminophen  IVPB .. 1000 milliGRAM(s) IV Intermittent every 6 hours  aluminum hydroxide/magnesium hydroxide/simethicone Suspension 30 milliLiter(s) Oral four times a day PRN  artificial  tears Solution 1 Drop(s) Both EYES two times a day  ceFAZolin   IVPB 2000 milliGRAM(s) IV Intermittent every 8 hours  docusate sodium 100 milliGRAM(s) Oral three times a day  HYDROmorphone  Injectable 0.5 milliGRAM(s) IV Push every 3 hours PRN  HYDROmorphone  Injectable 0.5 milliGRAM(s) IV Push every 10 minutes PRN  lactated ringers. 1000 milliLiter(s) IV Continuous <Continuous>  lactated ringers. 1000 milliLiter(s) IV Continuous <Continuous>  magnesium hydroxide Suspension 30 milliLiter(s) Oral daily PRN  ondansetron Injectable 4 milliGRAM(s) IV Push every 6 hours PRN  ondansetron Injectable 4 milliGRAM(s) IV Push once PRN  oxyCODONE    IR 5 milliGRAM(s) Oral every 3 hours PRN  oxyCODONE    IR 10 milliGRAM(s) Oral every 3 hours PRN  pantoprazole    Tablet 40 milliGRAM(s) Oral before breakfast  polyethylene glycol 3350 17 Gram(s) Oral daily  senna 2 Tablet(s) Oral at bedtime PRN    Anticoagulation: PAS to both LE's      Antibiotics:   ceFAZolin   IVPB 2000 milliGRAM(s) IV Intermittent every 8 hours for 2 more doses post op      Pain medications:   acetaminophen  IVPB .. 1000 milliGRAM(s) IV Intermittent every 6 hours  HYDROmorphone  Injectable 0.5 milliGRAM(s) IV Push every 3 hours PRN  HYDROmorphone  Injectable 0.5 milliGRAM(s) IV Push every 10 minutes PRN  ondansetron Injectable 4 milliGRAM(s) IV Push every 6 hours PRN  ondansetron Injectable 4 milliGRAM(s) IV Push once PRN  oxyCODONE    IR 5 milliGRAM(s) Oral every 3 hours PRN  oxyCODONE    IR 10 milliGRAM(s) Oral every 3 hours PRN          PE:  Right Knee-primary surgical bandage dry and intact.   Feet mobile and sensate.  DP pulses 1+  PAS on LE's.  Calves soft and nontender.    A/P: Ortho stable post-op  - Continue post-op orders; pain management with above plan.  - Check labs today and in A.M.  - DVT prevention with PAS and will start 6 weeks of Ecotrin in AM  - PT /OT for OOB, full WBAT  - Medical consult with Hospitalist pending.  -Discharge planning probable for home in 2 days.  -Will continue to monitor closely with attendings.
Orthopedic P.A.- POD# 1 - s/p Right TKR    Patient alert and comfortable in bed with oral meds for pain control.  Denies knee pain or nausea.    Vital Signs Last 24 Hrs  T(C): 36.7 (22 Aug 2019 07:24), Max: 37.1 (21 Aug 2019 15:00)  T(F): 98 (22 Aug 2019 07:24), Max: 98.7 (21 Aug 2019 15:00)  HR: 66 (22 Aug 2019 07:24) (66 - 93)  BP: 122/66 (22 Aug 2019 07:24) (108/63 - 141/74)  BP(mean): --  RR: 16 (22 Aug 2019 07:24) (16 - 23)  SpO2: 98% (22 Aug 2019 07:24) (96% - 100%)         I&O's Detail    21 Aug 2019 07:01  -  22 Aug 2019 07:00  --------------------------------------------------------  IN:    lactated ringers.: 1650 mL    Oral Fluid: 100 mL  Total IN: 1750 mL    OUT:    Estimated Blood Loss: 100 mL    Intermittent Catheterization - Urethral: 1000 mL yesterday; voided on own today.    Voided: 1400 mL  Total OUT: 2500 mL    Total NET: -750 mL      Labs:                                                    12.5<L>  17.83<H> )-----------( 196      ( 22 Aug 2019 08:06 )             38.0<L>  22 Aug 2019 08:06                                22 Aug 2019 08:06    137    |  105    |  15     ----------------------------<  118<H>  4.5     |  25     |  1.18     Ca    8.5        22 Aug 2019 08:06        MEDICATIONS:acetaminophen   Tablet .. 1000 milliGRAM(s) Oral every 8 hours  aluminum hydroxide/magnesium hydroxide/simethicone Suspension 30 milliLiter(s) Oral four times a day PRN  artificial  tears Solution 1 Drop(s) Both EYES two times a day  aspirin enteric coated 81 milliGRAM(s) Oral every 12 hours  celecoxib 200 milliGRAM(s) Oral every 12 hours  docusate sodium 100 milliGRAM(s) Oral three times a day  HYDROmorphone  Injectable 0.5 milliGRAM(s) IV Push every 3 hours PRN  lactated ringers. 1000 milliLiter(s) IV Continuous <Continuous>  magnesium hydroxide Suspension 30 milliLiter(s) Oral daily PRN  ondansetron Injectable 4 milliGRAM(s) IV Push every 6 hours PRN  oxyCODONE    IR 5 milliGRAM(s) Oral every 3 hours PRN  oxyCODONE    IR 10 milliGRAM(s) Oral every 3 hours PRN  pantoprazole    Tablet 40 milliGRAM(s) Oral before breakfast  polyethylene glycol 3350 17 Gram(s) Oral daily  senna 2 Tablet(s) Oral at bedtime PRN    Anticoagulation:  aspirin enteric coated 81 milliGRAM(s) Oral every 12 hours started this AM      Antibiotics: complete      Pain medications:   acetaminophen   Tablet .. 1000 milliGRAM(s) Oral every 8 hours  celecoxib 200 milliGRAM(s) Oral every 12 hours  HYDROmorphone  Injectable 0.5 milliGRAM(s) IV Push every 3 hours PRN  ondansetron Injectable 4 milliGRAM(s) IV Push every 6 hours PRN  oxyCODONE    IR 5 milliGRAM(s) Oral every 3 hours PRN  oxyCODONE    IR 10 milliGRAM(s) Oral every 3 hours PRN                                      Physical Exam:  Right knee- Primary surgical bandage dry and intact.    Neurovascular grossly intact LE's.  PAS on LE's.  Calves soft and non-tender.                                                                                                                                                          A/P:  Orthopedically stable.  -Continue pain management with above plan.  -DVT prophylaxis with 6 weeks of Ecotrin.  -labs OK today; RECheck labs again tomorrow  -Increase ambulation and ROM right knee with PT/OT. Knee extension encouraged.  -Dr. Almanza for continued medical care  -Discharge planning for Home with home care services tomorrow.  -Further plan as per attendings.
POST OPERATIVE DAY #: 3    74y Male  s/p Right  TKA                   SUBJECTIVE: Patient seen and examined at bedside.     Pain:  well controlled       Pain scale: 2  /10  Denies CP, SOB, N/V/D, weakness, numbness   No new complains     OBJECTIVE:     Vital Signs Last 24 Hrs  T(C): 36.8 (24 Aug 2019 07:10), Max: 36.8 (23 Aug 2019 22:27)  T(F): 98.3 (24 Aug 2019 07:10), Max: 98.3 (24 Aug 2019 07:10)  HR: 63 (24 Aug 2019 07:10) (63 - 80)  BP: 115/69 (24 Aug 2019 07:10) (114/74 - 124/70)  BP(mean): --  RR: 16 (24 Aug 2019 07:10) (14 - 16)  SpO2: 99% (24 Aug 2019 07:10) (99% - 100%)    Affected extremity LLE         Dressing: removed, incision  clean/dry/intact                            Sensation: intact to light touch          Motor exam:   5/ 5 Tibialis Anterior/Gastrocnemius-Soleus, EHL/FHL         warm, well-perfused; capillary refill < 3 seconds         negative calf tenderness B/L LE    LABS:                        11.0   9.73  )-----------( 161      ( 24 Aug 2019 06:46 )             34.1     08-24    139  |  106  |  16  ----------------------------<  94  4.8   |  30  |  1.10    Ca    8.2<L>      24 Aug 2019 06:46        I&O's Detail    23 Aug 2019 07:01  -  24 Aug 2019 07:00  --------------------------------------------------------  IN:    Oral Fluid: 100 mL  Total IN: 100 mL    OUT:    Voided: 850 mL  Total OUT: 850 mL    Total NET: -750 mL          MEDICATIONS:      Pain management:  acetaminophen   Tablet .. 1000 milliGRAM(s) Oral every 8 hours  celecoxib 200 milliGRAM(s) Oral every 12 hours  HYDROmorphone  Injectable 0.5 milliGRAM(s) IV Push every 3 hours PRN  ondansetron Injectable 4 milliGRAM(s) IV Push every 6 hours PRN  oxyCODONE    IR 5 milliGRAM(s) Oral every 3 hours PRN  oxyCODONE    IR 10 milliGRAM(s) Oral every 3 hours PRN    DVT prophylaxis:   aspirin enteric coated 81 milliGRAM(s) Oral every 12 hours      RADIOLOGY & ADDITIONAL STUDIES:    ASSESSMENT AND PLAN:     - Analgesic pain control  - DVT prophylaxis: ASA 81 mg twice a day     SCDs       - pain Management: Celebrex 200mg twice a day x 21 days   - PT/OT: Weight Bearing Status:  Weight bearing as tolerated, OOBTC        -  Incentive spirometry  - Advance diet as tolerated  - Hospitalist is following  -  Follow up labs  -  Disposition: Home  today
Patient is a 74y old  Male who presents with a chief complaint of Elective right total knee replacement (21 Aug 2019 14:50)    INTERVAL HPI/OVERNIGHT EVENTS:  He is comfortable in bed and has no complaints.  feeling well.     MEDICATIONS  (STANDING):  acetaminophen   Tablet .. 1000 milliGRAM(s) Oral every 8 hours  artificial  tears Solution 1 Drop(s) Both EYES two times a day  aspirin enteric coated 81 milliGRAM(s) Oral every 12 hours  celecoxib 200 milliGRAM(s) Oral every 12 hours  docusate sodium 100 milliGRAM(s) Oral three times a day  pantoprazole    Tablet 40 milliGRAM(s) Oral before breakfast  polyethylene glycol 3350 17 Gram(s) Oral daily    MEDICATIONS  (PRN):  aluminum hydroxide/magnesium hydroxide/simethicone Suspension 30 milliLiter(s) Oral four times a day PRN Indigestion  HYDROmorphone  Injectable 0.5 milliGRAM(s) IV Push every 3 hours PRN Severe Pain (7 - 10)  magnesium hydroxide Suspension 30 milliLiter(s) Oral daily PRN Constipation  ondansetron Injectable 4 milliGRAM(s) IV Push every 6 hours PRN Nausea and/or Vomiting  oxyCODONE    IR 5 milliGRAM(s) Oral every 3 hours PRN Mild Pain (1 - 3)  oxyCODONE    IR 10 milliGRAM(s) Oral every 3 hours PRN Moderate Pain (4 - 6)  senna 2 Tablet(s) Oral at bedtime PRN Constipation      Allergies  No Known Allergies    REVIEW OF SYSTEMS:  CONSTITUTIONAL: No fever, weight loss, or fatigue  EYES: No eye pain, visual disturbances, or discharge  ENMT:  No difficulty hearing, tinnitus, vertigo; No sinus or throat pain  NECK: No pain or stiffness  BREASTS: No pain, masses, or nipple discharge  RESPIRATORY: No cough, wheezing, chills or hemoptysis; No shortness of breath  CARDIOVASCULAR: No chest pain, palpitations, or lightheadedness  GASTROINTESTINAL: No abdominal or epigastric pain. No nausea, vomiting, or hematemesis; No diarrhea or constipation. No melena or hematochezia.  GENITOURINARY: No dysuria, frequency, hematuria, or incontinence  NEUROLOGICAL: No headaches, vertigo, memory loss, loss of strength, numbness, or tremors  SKIN: No itching, burning, rashes, or lesions   LYMPH NODES: No enlarged glands  ENDOCRINE: No heat or cold intolerance; No hair loss; No polydipsia or polyuria  MUSCULOSKELETAL: No back pain  PSYCHIATRIC: No depression, anxiety, or mood swings  HEME/LYMPH: No easy bruising, or bleeding gums  ALLERGY AND IMMUNOLOGIC: No hives or eczema    Vital Signs Last 24 Hrs  T(C): 36.7 (22 Aug 2019 11:25), Max: 37.1 (21 Aug 2019 15:00)  T(F): 98 (22 Aug 2019 11:25), Max: 98.7 (21 Aug 2019 15:00)  HR: 76 (22 Aug 2019 11:25) (66 - 93)  BP: 131/66 (22 Aug 2019 11:25) (108/63 - 138/78)  BP(mean): --  RR: 16 (22 Aug 2019 11:25) (16 - 16)  SpO2: 93% (22 Aug 2019 11:25) (93% - 100%)    PHYSICAL EXAM:  GENERAL: NAD, well-groomed, well-developed  HEAD:  Atraumatic, Normocephalic  EYES: EOMI, PERRLA, conjunctiva and sclera clear  ENMT: Moist mucous membranes  NECK: Supple, No JVD, Normal thyroid  NERVOUS SYSTEM:  Alert & Oriented X3, Good concentration; Bilateral LE mobile, sensation to light touch intact  CHEST/LUNG: Clear to auscultation bilaterally;   HEART: Regular rate and rhythm;   ABDOMEN: Soft, Nontender, Nondistended; Bowel sounds present  EXTREMITIES:  2+ Peripheral Pulses, No clubbing or cyanosis  LYMPH: No lymphadenopathy noted  SKIN: No rashes or lesions  INCISION:  Dressing dry and intact    LABS:                        12.5   17.83 )-----------( 196      ( 22 Aug 2019 08:06 )             38.0     22 Aug 2019 08:06    137    |  105    |  15     ----------------------------<  118    4.5     |  25     |  1.18     Ca    8.5        22 Aug 2019 08:06          CAPILLARY BLOOD GLUCOSE          RADIOLOGY & ADDITIONAL TESTS:    Imaging Personally Reviewed:      [ ] Consultant(s) Notes Reviewed  [x] Care Discussed with Consultants/Other Providers:  Ortho PA- plan of care
Patient is a 74y old  Male who presents with a chief complaint of s/p right TKR (23 Aug 2019 10:24)      Subjective: feels well. pain tolerable   good appetite.  participating in PT.     MEDICATIONS  (STANDING):  acetaminophen   Tablet .. 1000 milliGRAM(s) Oral every 8 hours  artificial  tears Solution 1 Drop(s) Both EYES two times a day  aspirin enteric coated 81 milliGRAM(s) Oral every 12 hours  celecoxib 200 milliGRAM(s) Oral every 12 hours  docusate sodium 100 milliGRAM(s) Oral three times a day  pantoprazole    Tablet 40 milliGRAM(s) Oral before breakfast  polyethylene glycol 3350 17 Gram(s) Oral daily    MEDICATIONS  (PRN):  aluminum hydroxide/magnesium hydroxide/simethicone Suspension 30 milliLiter(s) Oral four times a day PRN Indigestion  bisacodyl Suppository 10 milliGRAM(s) Rectal daily PRN If no bowel movement by postoperative day #2  HYDROmorphone  Injectable 0.5 milliGRAM(s) IV Push every 3 hours PRN Severe Pain (7 - 10)  magnesium hydroxide Suspension 30 milliLiter(s) Oral daily PRN Constipation  ondansetron Injectable 4 milliGRAM(s) IV Push every 6 hours PRN Nausea and/or Vomiting  oxyCODONE    IR 5 milliGRAM(s) Oral every 3 hours PRN Mild Pain (1 - 3)  oxyCODONE    IR 10 milliGRAM(s) Oral every 3 hours PRN Moderate Pain (4 - 6)  senna 2 Tablet(s) Oral at bedtime PRN Constipation      Allergies    No Known Allergies    Intolerances        REVIEW OF SYSTEMS:  Negative unless otherwise specified above.    Vital Signs Last 24 Hrs  T(C): 36.8 (24 Aug 2019 07:10), Max: 36.8 (23 Aug 2019 22:27)  T(F): 98.3 (24 Aug 2019 07:10), Max: 98.3 (24 Aug 2019 07:10)  HR: 63 (24 Aug 2019 07:10) (63 - 80)  BP: 115/69 (24 Aug 2019 07:10) (114/74 - 124/70)  BP(mean): --  RR: 16 (24 Aug 2019 07:10) (14 - 16)  SpO2: 99% (24 Aug 2019 07:10) (99% - 100%)    PHYSICAL EXAM:  GENERAL: No apparent distress  HEAD:  Atraumatic, Normocephalic  EYES: conjunctiva and sclera clear  ENMT: Moist mucous membranes  NECK: Supple  CHEST/LUNG: Clear to auscultation bilaterally  HEART: Regular rate and rhythm  ABDOMEN: Soft, Nontender, Nondistended; Bowel sounds present  EXTREMITIES:  No clubbing, cyanosis or edema  SKIN: No rashes   NERVOUS SYSTEM:  Alert & Oriented; Bilateral Lower extremity mobile, sensation to light touch intact  INCISION:  Dressing dry and intact        LABS:   Hemoglobin: 11.0 g/dL <L> (08-24 @ 06:46)  WBC Count: 9.73 K/uL (08-24 @ 06:46)  Platelet Count - Automated: 161 K/uL (08-24 @ 06:46)  Hematocrit: 34.1 % <L> (08-24 @ 06:46)  RBC Count: 3.74 M/uL <L> (08-24 @ 06:46)      08-24    139  |  106  |  16  ----------------------------<  94  4.8   |  30  |  1.10    Ca    8.2<L>      24 Aug 2019 06:46                                        IMAGING: images reviewed personally      Consultant Notes Reviewed and Care Discussed with relevant Consultants/Other Providers.
Patient is a 74y old  Male who presents with a chief complaint of s/p right TKR (23 Aug 2019 10:24)    INTERVAL HPI/OVERNIGHT EVENTS: feeling well, no new complaints.     MEDICATIONS  (STANDING):  acetaminophen   Tablet .. 1000 milliGRAM(s) Oral every 8 hours  artificial  tears Solution 1 Drop(s) Both EYES two times a day  aspirin enteric coated 81 milliGRAM(s) Oral every 12 hours  celecoxib 200 milliGRAM(s) Oral every 12 hours  docusate sodium 100 milliGRAM(s) Oral three times a day  pantoprazole    Tablet 40 milliGRAM(s) Oral before breakfast  polyethylene glycol 3350 17 Gram(s) Oral daily    MEDICATIONS  (PRN):  aluminum hydroxide/magnesium hydroxide/simethicone Suspension 30 milliLiter(s) Oral four times a day PRN Indigestion  bisacodyl Suppository 10 milliGRAM(s) Rectal daily PRN If no bowel movement by postoperative day #2  HYDROmorphone  Injectable 0.5 milliGRAM(s) IV Push every 3 hours PRN Severe Pain (7 - 10)  magnesium hydroxide Suspension 30 milliLiter(s) Oral daily PRN Constipation  ondansetron Injectable 4 milliGRAM(s) IV Push every 6 hours PRN Nausea and/or Vomiting  oxyCODONE    IR 5 milliGRAM(s) Oral every 3 hours PRN Mild Pain (1 - 3)  oxyCODONE    IR 10 milliGRAM(s) Oral every 3 hours PRN Moderate Pain (4 - 6)  senna 2 Tablet(s) Oral at bedtime PRN Constipation      Allergies  No Known Allergies    REVIEW OF SYSTEMS:  CONSTITUTIONAL: No fever, weight loss, or fatigue  EYES: No eye pain, visual disturbances, or discharge  ENMT:  No difficulty hearing, tinnitus, vertigo; No sinus or throat pain  NECK: No pain or stiffness  BREASTS: No pain, masses, or nipple discharge  RESPIRATORY: No cough, wheezing, chills or hemoptysis; No shortness of breath  CARDIOVASCULAR: No chest pain, palpitations, or lightheadedness  GASTROINTESTINAL: No abdominal or epigastric pain. No nausea, vomiting, or hematemesis; No diarrhea or constipation. No melena or hematochezia.  GENITOURINARY: No dysuria, frequency, hematuria, or incontinence  NEUROLOGICAL: No headaches, vertigo, memory loss, loss of strength, numbness, or tremors  SKIN: No itching, burning, rashes, or lesions   LYMPH NODES: No enlarged glands  ENDOCRINE: No heat or cold intolerance; No hair loss; No polydipsia or polyuria  MUSCULOSKELETAL: No back pain  PSYCHIATRIC: No depression, anxiety, or mood swings  HEME/LYMPH: No easy bruising, or bleeding gums  ALLERGY AND IMMUNOLOGIC: No hives or eczema    Vital Signs Last 24 Hrs  T(C): 36.7 (23 Aug 2019 07:12), Max: 36.7 (22 Aug 2019 22:20)  T(F): 98 (23 Aug 2019 07:12), Max: 98 (22 Aug 2019 22:20)  HR: 61 (23 Aug 2019 07:12) (58 - 70)  BP: 131/78 (23 Aug 2019 07:12) (103/57 - 131/78)  BP(mean): --  RR: 16 (23 Aug 2019 07:12) (15 - 16)  SpO2: 98% (23 Aug 2019 07:12) (97% - 98%)    PHYSICAL EXAM:  GENERAL: NAD, well-groomed, well-developed  HEAD:  Atraumatic, Normocephalic  EYES: conjunctiva and sclera clear  ENMT: Moist mucous membranes  NECK: Supple, No JVD  NERVOUS SYSTEM:  Alert & Oriented X3, Good concentration; Bilateral LE mobile, sensation to light touch intact  CHEST/LUNG: Clear to auscultation bilaterally; No rales, rhonchi, wheezing, or rubs  HEART: Regular rate and rhythm; No murmurs, rubs, or gallops  ABDOMEN: Soft, Nontender, Nondistended; Bowel sounds present  EXTREMITIES:  2+ Peripheral Pulses, No clubbing or cyanosis  LYMPH: No lymphadenopathy noted  SKIN: No rashes or lesions  INCISION:  Dressing dry and intact    LABS:                        13.0   13.04 )-----------( 211      ( 23 Aug 2019 08:29 )             40.0     23 Aug 2019 08:29    140    |  105    |  19     ----------------------------<  92     4.0     |  26     |  1.30     Ca    8.6        23 Aug 2019 08:29          CAPILLARY BLOOD GLUCOSE          RADIOLOGY & ADDITIONAL TESTS:    Imaging Personally Reviewed:      [ ] Consultant(s) Notes Reviewed  [x] Care Discussed with Consultants/Other Providers:  Ortho PA- plan of care

## 2019-09-04 ENCOUNTER — APPOINTMENT (OUTPATIENT)
Dept: ORTHOPEDIC SURGERY | Facility: CLINIC | Age: 74
End: 2019-09-04
Payer: MEDICARE

## 2019-09-04 VITALS
HEART RATE: 65 BPM | DIASTOLIC BLOOD PRESSURE: 71 MMHG | WEIGHT: 170 LBS | BODY MASS INDEX: 27.32 KG/M2 | HEIGHT: 66 IN | SYSTOLIC BLOOD PRESSURE: 112 MMHG

## 2019-09-04 PROCEDURE — 73562 X-RAY EXAM OF KNEE 3: CPT | Mod: RT

## 2019-09-04 PROCEDURE — 99024 POSTOP FOLLOW-UP VISIT: CPT

## 2019-09-04 RX ORDER — ACETAMINOPHEN 500 MG/1
500 TABLET ORAL
Refills: 0 | Status: ACTIVE | COMMUNITY

## 2019-09-17 ENCOUNTER — APPOINTMENT (OUTPATIENT)
Dept: ORTHOPEDIC SURGERY | Facility: CLINIC | Age: 74
End: 2019-09-17
Payer: MEDICARE

## 2019-09-17 VITALS — DIASTOLIC BLOOD PRESSURE: 79 MMHG | HEART RATE: 79 BPM | SYSTOLIC BLOOD PRESSURE: 125 MMHG | HEIGHT: 66 IN

## 2019-09-17 PROCEDURE — 99024 POSTOP FOLLOW-UP VISIT: CPT

## 2019-09-19 PROCEDURE — 73562 X-RAY EXAM OF KNEE 3: CPT

## 2019-09-19 PROCEDURE — 94664 DEMO&/EVAL PT USE INHALER: CPT

## 2019-09-19 PROCEDURE — 97165 OT EVAL LOW COMPLEX 30 MIN: CPT

## 2019-09-19 PROCEDURE — 97535 SELF CARE MNGMENT TRAINING: CPT

## 2019-09-19 PROCEDURE — C1776: CPT

## 2019-09-19 PROCEDURE — 97161 PT EVAL LOW COMPLEX 20 MIN: CPT

## 2019-09-19 PROCEDURE — 80048 BASIC METABOLIC PNL TOTAL CA: CPT

## 2019-09-19 PROCEDURE — 97116 GAIT TRAINING THERAPY: CPT

## 2019-09-19 PROCEDURE — 36415 COLL VENOUS BLD VENIPUNCTURE: CPT

## 2019-09-19 PROCEDURE — 88305 TISSUE EXAM BY PATHOLOGIST: CPT

## 2019-09-19 PROCEDURE — C1713: CPT

## 2019-09-19 PROCEDURE — 97110 THERAPEUTIC EXERCISES: CPT

## 2019-09-19 PROCEDURE — 85027 COMPLETE CBC AUTOMATED: CPT

## 2019-09-19 PROCEDURE — 88311 DECALCIFY TISSUE: CPT

## 2019-09-19 PROCEDURE — 97530 THERAPEUTIC ACTIVITIES: CPT

## 2019-09-19 PROCEDURE — C1889: CPT

## 2019-10-16 ENCOUNTER — FORM ENCOUNTER (OUTPATIENT)
Age: 74
End: 2019-10-16

## 2019-10-17 ENCOUNTER — CHART COPY (OUTPATIENT)
Age: 74
End: 2019-10-17

## 2019-10-17 ENCOUNTER — OUTPATIENT (OUTPATIENT)
Dept: OUTPATIENT SERVICES | Facility: HOSPITAL | Age: 74
LOS: 1 days | End: 2019-10-17
Payer: MEDICARE

## 2019-10-17 ENCOUNTER — APPOINTMENT (OUTPATIENT)
Dept: ULTRASOUND IMAGING | Facility: CLINIC | Age: 74
End: 2019-10-17

## 2019-10-17 DIAGNOSIS — Z98.890 OTHER SPECIFIED POSTPROCEDURAL STATES: Chronic | ICD-10-CM

## 2019-10-17 DIAGNOSIS — Z98.49 CATARACT EXTRACTION STATUS, UNSPECIFIED EYE: Chronic | ICD-10-CM

## 2019-10-17 DIAGNOSIS — Z96.651 PRESENCE OF RIGHT ARTIFICIAL KNEE JOINT: ICD-10-CM

## 2019-10-17 PROCEDURE — 93971 EXTREMITY STUDY: CPT

## 2019-10-17 PROCEDURE — 93971 EXTREMITY STUDY: CPT | Mod: 26,RT

## 2019-10-23 ENCOUNTER — APPOINTMENT (OUTPATIENT)
Dept: ORTHOPEDIC SURGERY | Facility: CLINIC | Age: 74
End: 2019-10-23
Payer: COMMERCIAL

## 2019-10-23 PROCEDURE — 72100 X-RAY EXAM L-S SPINE 2/3 VWS: CPT

## 2019-10-23 PROCEDURE — 99214 OFFICE O/P EST MOD 30 MIN: CPT | Mod: 24

## 2019-10-23 PROCEDURE — 73562 X-RAY EXAM OF KNEE 3: CPT | Mod: RT

## 2019-10-28 ENCOUNTER — APPOINTMENT (OUTPATIENT)
Dept: ORTHOPEDIC SURGERY | Facility: CLINIC | Age: 74
End: 2019-10-28

## 2019-11-05 ENCOUNTER — APPOINTMENT (OUTPATIENT)
Dept: ORTHOPEDIC SURGERY | Facility: CLINIC | Age: 74
End: 2019-11-05

## 2019-12-06 ENCOUNTER — APPOINTMENT (OUTPATIENT)
Dept: PULMONOLOGY | Facility: CLINIC | Age: 74
End: 2019-12-06
Payer: MEDICARE

## 2019-12-06 VITALS
BODY MASS INDEX: 28.82 KG/M2 | RESPIRATION RATE: 16 BRPM | OXYGEN SATURATION: 98 % | DIASTOLIC BLOOD PRESSURE: 60 MMHG | WEIGHT: 173 LBS | HEIGHT: 65 IN | HEART RATE: 93 BPM | SYSTOLIC BLOOD PRESSURE: 120 MMHG

## 2019-12-06 DIAGNOSIS — R09.89 OTHER SPECIFIED SYMPTOMS AND SIGNS INVOLVING THE CIRCULATORY AND RESPIRATORY SYSTEMS: ICD-10-CM

## 2019-12-06 PROCEDURE — 71046 X-RAY EXAM CHEST 2 VIEWS: CPT

## 2019-12-06 PROCEDURE — 94727 GAS DIL/WSHOT DETER LNG VOL: CPT

## 2019-12-06 PROCEDURE — ZZZZZ: CPT

## 2019-12-06 PROCEDURE — 94618 PULMONARY STRESS TESTING: CPT

## 2019-12-06 PROCEDURE — 99204 OFFICE O/P NEW MOD 45 MIN: CPT | Mod: 25

## 2019-12-06 PROCEDURE — 94060 EVALUATION OF WHEEZING: CPT

## 2019-12-06 PROCEDURE — 94729 DIFFUSING CAPACITY: CPT

## 2019-12-06 RX ORDER — AMOXICILLIN 500 MG/1
500 TABLET, FILM COATED ORAL
Qty: 20 | Refills: 2 | Status: DISCONTINUED | COMMUNITY
Start: 2019-09-04 | End: 2019-12-06

## 2019-12-06 RX ORDER — FLUTICASONE PROPIONATE AND SALMETEROL 250; 50 UG/1; UG/1
250-50 POWDER RESPIRATORY (INHALATION)
Qty: 3 | Refills: 1 | Status: ACTIVE | COMMUNITY
Start: 2019-12-06 | End: 1900-01-01

## 2019-12-06 NOTE — PROCEDURE
[FreeTextEntry1] : Full PFT revealed moderate-severe obstructive dysfunction, with a FEV1 of 1.51L, which is 58% of predicted with a 39% improvement with bronchodilator, normal lung volumes, and a diffusion of 19.5, which is 106% of predicted, with a normal flow volume loop. \par \par CXR reveals a normal sized heart; no evidence of infiltrate or effusion--a normal appearing chest radiograph\par \par 6 minute walk test reveals a low saturation of 93% with no evidence of dyspnea or fatigue; walked 554.2 meters\par \par \par  \par \par  \par \par \par

## 2019-12-06 NOTE — HISTORY OF PRESENT ILLNESS
[FreeTextEntry1] : Mr. Rogers is a 74 year old male here today for an initial pulmonary evaluation. His chief complaint is\par - He has sinus problems every time he is in [place]\par - He is usually given steroids\par - This year after,\par - He exercises regularly (swimming) with no limitations\par - He has started having a sore throat\par - He has some reflux/heartburn\par - He does not snore\par - He moves at night before he falls asleep\par - Weight is stable\par - He cannot fall asleep watching TV or in the car \par - He feels tired after he eats in the afternoon\par - No palpitations\par - He has  GI issues\par - denies any headaches, nausea, vomiting, fever, chills, sweats, chest pain, chest pressure, diarrhea, constipation, dysphagia, dizziness, leg swelling, leg pain, itchy eyes, itchy ears, or sour taste in the mouth.\par \par \par

## 2019-12-06 NOTE — PHYSICAL EXAM
[General Appearance - Well Developed] : well developed [Normal Appearance] : normal appearance [No Deformities] : no deformities [Well Groomed] : well groomed [General Appearance - Well Nourished] : well nourished [Normal Conjunctiva] : the conjunctiva exhibited no abnormalities [Eyelids - No Xanthelasma] : the eyelids demonstrated no xanthelasmas [General Appearance - In No Acute Distress] : no acute distress [Neck Appearance] : the appearance of the neck was normal [Normal Oropharynx] : normal oropharynx [Thyroid Diffuse Enlargement] : the thyroid was not enlarged [Neck Cervical Mass (___cm)] : no neck mass was observed [Thyroid Nodule] : there were no palpable thyroid nodules [Jugular Venous Distention Increased] : there was no jugular-venous distention [Heart Sounds] : normal S1 and S2 [Murmurs] : no murmurs present [Heart Rate And Rhythm] : heart rate and rhythm were normal [Respiration, Rhythm And Depth] : normal respiratory rhythm and effort [Abdomen Soft] : soft [Auscultation Breath Sounds / Voice Sounds] : lungs were clear to auscultation bilaterally [Exaggerated Use Of Accessory Muscles For Inspiration] : no accessory muscle use [Abdomen Tenderness] : non-tender [Abdomen Mass (___ Cm)] : no abdominal mass palpated [Nail Clubbing] : no clubbing of the fingernails [Abnormal Walk] : normal gait [Gait - Sufficient For Exercise Testing] : the gait was sufficient for exercise testing [Petechial Hemorrhages (___cm)] : no petechial hemorrhages [Cyanosis, Localized] : no localized cyanosis [Skin Color & Pigmentation] : normal skin color and pigmentation [Skin Turgor] : normal skin turgor [] : no rash [Deep Tendon Reflexes (DTR)] : deep tendon reflexes were 2+ and symmetric [Sensation] : the sensory exam was normal to light touch and pinprick [No Focal Deficits] : no focal deficits [Oriented To Time, Place, And Person] : oriented to person, place, and time [Impaired Insight] : insight and judgment were intact [Affect] : the affect was normal [II] : II [FreeTextEntry1] : I:E ratio 1:3; clear.

## 2019-12-06 NOTE — ADDENDUM
[FreeTextEntry1] : Documented by Pratibha Schaeffer acting as a scribe for Dr. Eron Johnson on (12/06/2019).\par \par All medical record entries made by the Scribe were at my, Dr. Eron Johnson's, direction and personally dictated by me on (12/06/2019). I have reviewed the chart and agree that the record accurately reflects my personal performance of the history, physical exam, assessment and plan. I have also personally directed, reviewed, and agree with the discharge instructions. \par \par \par

## 2019-12-09 ENCOUNTER — APPOINTMENT (OUTPATIENT)
Dept: ORTHOPEDIC SURGERY | Facility: CLINIC | Age: 74
End: 2019-12-09
Payer: COMMERCIAL

## 2019-12-09 ENCOUNTER — LABORATORY RESULT (OUTPATIENT)
Age: 74
End: 2019-12-09

## 2019-12-09 ENCOUNTER — RX CHANGE (OUTPATIENT)
Age: 74
End: 2019-12-09

## 2019-12-09 VITALS — HEIGHT: 65 IN | BODY MASS INDEX: 28.82 KG/M2 | WEIGHT: 173 LBS

## 2019-12-09 LAB
25(OH)D3 SERPL-MCNC: 27.3 NG/ML
BASOPHILS # BLD AUTO: 0.03 K/UL
BASOPHILS NFR BLD AUTO: 0.4 %
EOSINOPHIL # BLD AUTO: 0.22 K/UL
EOSINOPHIL NFR BLD AUTO: 2.6 %
HCT VFR BLD CALC: 41 %
HGB BLD-MCNC: 12.8 G/DL
IMM GRANULOCYTES NFR BLD AUTO: 0.4 %
LYMPHOCYTES # BLD AUTO: 2.83 K/UL
LYMPHOCYTES NFR BLD AUTO: 33.4 %
MAN DIFF?: NORMAL
MCHC RBC-ENTMCNC: 28.8 PG
MCHC RBC-ENTMCNC: 31.2 GM/DL
MCV RBC AUTO: 92.3 FL
MONOCYTES # BLD AUTO: 0.96 K/UL
MONOCYTES NFR BLD AUTO: 11.3 %
NEUTROPHILS # BLD AUTO: 4.4 K/UL
NEUTROPHILS NFR BLD AUTO: 51.9 %
PLATELET # BLD AUTO: 194 K/UL
RBC # BLD: 4.44 M/UL
RBC # FLD: 14 %
WBC # FLD AUTO: 8.47 K/UL

## 2019-12-09 PROCEDURE — 99213 OFFICE O/P EST LOW 20 MIN: CPT

## 2019-12-09 RX ORDER — OLOPATADINE HYDROCHLORIDE 665 UG/1
0.6 SPRAY, METERED NASAL
Qty: 3 | Refills: 1 | Status: DISCONTINUED | COMMUNITY
Start: 2019-12-06 | End: 2019-12-09

## 2019-12-10 ENCOUNTER — APPOINTMENT (OUTPATIENT)
Dept: ORTHOPEDIC SURGERY | Facility: CLINIC | Age: 74
End: 2019-12-10
Payer: COMMERCIAL

## 2019-12-10 VITALS
HEIGHT: 65 IN | HEART RATE: 75 BPM | WEIGHT: 173 LBS | SYSTOLIC BLOOD PRESSURE: 117 MMHG | DIASTOLIC BLOOD PRESSURE: 72 MMHG | BODY MASS INDEX: 28.82 KG/M2

## 2019-12-10 LAB
24R-OH-CALCIDIOL SERPL-MCNC: 35.9 PG/ML
A ALTERNATA IGE QN: 0.47 KUA/L
A FUMIGATUS IGE QN: 0.4 KUA/L
C ALBICANS IGE QN: <0.1 KUA/L
C HERBARUM IGE QN: 0.47 KUA/L
CAT DANDER IGE QN: <0.1 KUA/L
CLAM IGE QN: <0.1 KUA/L
CODFISH IGE QN: <0.1 KUA/L
COMMON RAGWEED IGE QN: <0.1 KUA/L
CORN IGE QN: <0.1 KUA/L
COW MILK IGE QN: 0.3 KUA/L
D FARINAE IGE QN: 0.23 KUA/L
D PTERONYSS IGE QN: 0.11 KUA/L
DEPRECATED A ALTERNATA IGE RAST QL: 1
DEPRECATED A FUMIGATUS IGE RAST QL: 1
DEPRECATED C ALBICANS IGE RAST QL: 0
DEPRECATED C HERBARUM IGE RAST QL: 1
DEPRECATED CAT DANDER IGE RAST QL: 0
DEPRECATED CLAM IGE RAST QL: 0
DEPRECATED CODFISH IGE RAST QL: 0
DEPRECATED COMMON RAGWEED IGE RAST QL: 0
DEPRECATED CORN IGE RAST QL: 0
DEPRECATED COW MILK IGE RAST QL: NORMAL
DEPRECATED D FARINAE IGE RAST QL: NORMAL
DEPRECATED D PTERONYSS IGE RAST QL: NORMAL
DEPRECATED DOG DANDER IGE RAST QL: 0
DEPRECATED EGG WHITE IGE RAST QL: 0
DEPRECATED M RACEMOSUS IGE RAST QL: 2
DEPRECATED PEANUT IGE RAST QL: 0
DEPRECATED ROACH IGE RAST QL: 0
DEPRECATED SCALLOP IGE RAST QL: <0.1 KUA/L
DEPRECATED SESAME SEED IGE RAST QL: 0
DEPRECATED SHRIMP IGE RAST QL: 0
DEPRECATED SOYBEAN IGE RAST QL: 0
DEPRECATED TIMOTHY IGE RAST QL: 0
DEPRECATED WALNUT IGE RAST QL: 0
DEPRECATED WHEAT IGE RAST QL: 0
DEPRECATED WHITE OAK IGE RAST QL: 0
DOG DANDER IGE QN: <0.1 KUA/L
EGG WHITE IGE QN: <0.1 KUA/L
M RACEMOSUS IGE QN: 1.37 KUA/L
PEANUT IGE QN: <0.1 KUA/L
ROACH IGE QN: <0.1 KUA/L
SCALLOP IGE QN: 0
SCALLOP IGE QN: <0.1 KUA/L
SESAME SEED IGE QN: <0.1 KUA/L
SOYBEAN IGE QN: <0.1 KUA/L
TIMOTHY IGE QN: <0.1 KUA/L
TOTAL IGE SMQN RAST: 117 KU/L
WALNUT IGE QN: <0.1 KUA/L
WHEAT IGE QN: <0.1 KUA/L
WHITE OAK IGE QN: <0.1 KUA/L

## 2019-12-10 PROCEDURE — 73562 X-RAY EXAM OF KNEE 3: CPT | Mod: RT

## 2019-12-10 PROCEDURE — 72170 X-RAY EXAM OF PELVIS: CPT | Mod: 59

## 2019-12-10 PROCEDURE — 99213 OFFICE O/P EST LOW 20 MIN: CPT

## 2020-02-07 ENCOUNTER — APPOINTMENT (OUTPATIENT)
Dept: PULMONOLOGY | Facility: CLINIC | Age: 75
End: 2020-02-07
Payer: MEDICARE

## 2020-02-07 VITALS
WEIGHT: 168 LBS | OXYGEN SATURATION: 97 % | HEART RATE: 66 BPM | BODY MASS INDEX: 27.99 KG/M2 | SYSTOLIC BLOOD PRESSURE: 120 MMHG | HEIGHT: 65 IN | DIASTOLIC BLOOD PRESSURE: 80 MMHG | RESPIRATION RATE: 16 BRPM

## 2020-02-07 PROCEDURE — 94729 DIFFUSING CAPACITY: CPT

## 2020-02-07 PROCEDURE — 99214 OFFICE O/P EST MOD 30 MIN: CPT | Mod: 25

## 2020-02-07 PROCEDURE — ZZZZZ: CPT

## 2020-02-07 PROCEDURE — 94060 EVALUATION OF WHEEZING: CPT

## 2020-02-07 RX ORDER — DESLORATADINE 5 MG/1
5 TABLET ORAL DAILY
Qty: 90 | Refills: 1 | Status: ACTIVE | COMMUNITY
Start: 2020-02-07 | End: 1900-01-01

## 2020-02-07 RX ORDER — BUDESONIDE AND FORMOTEROL FUMARATE DIHYDRATE 160; 4.5 UG/1; UG/1
160-4.5 AEROSOL RESPIRATORY (INHALATION) TWICE DAILY
Qty: 3 | Refills: 1 | Status: ACTIVE | COMMUNITY
Start: 2020-02-07 | End: 1900-01-01

## 2020-02-07 NOTE — ADDENDUM
[FreeTextEntry1] : Documented by Denilson Goodwin acting as a scribe for Dr. Eron Johnson on 02/07/2020.\par \par All medical record entries made by the Scribe were at my, Dr. Eron Johnson's, direction and personally dictated by me on 02/07/2020. I have reviewed the chart and agree that the record accurately reflects my personal performance of the history, physical exam, assessment and plan. I have also personally directed, reviewed, and agree with the discharge instructions. \par

## 2020-02-07 NOTE — REVIEW OF SYSTEMS
[Negative] : Endocrine [Recent Wt Loss (___ Lbs)] : ~T recent [unfilled] lb weight loss [Nasal Congestion] : nasal congestion [Sinus Problems] : sinus problems [Chest Discomfort] : chest discomfort [SOB on Exertion] : sob on exertion [Dyspnea] : dyspnea [Constipation] : constipation [Diarrhea] : no diarrhea [GERD] : no gerd [Dysphagia] : no dysphagia [Nocturia] : no nocturia

## 2020-02-07 NOTE — PROCEDURE
[FreeTextEntry1] : PFT (in [place]) revealed normal flows, with a FEV1 of 1.58L, which is 76% of predicted, with 13% improvement with use of bronchodilator, with a normal flow volume loop \par \par Full PFT revealed mild to moderate obstructive dysfunction, with a FEV1 of 1.59L, which is 62% of predicted with a 22% improvement with a bronchodilator, normal lung volumes, and a diffusion of 15.3, which is 86% of predicted, with a normal flow volume loop.

## 2020-02-07 NOTE — HISTORY OF PRESENT ILLNESS
[FreeTextEntry1] : Mr. Rogers is a 75 year old male with a history of asthma, GERD, SOB, and vitamin D deficiency presenting today for a follow up pulmonary evaluation. His chief complaint is SOB.\par -he is s/p a visit to [place], and had a pft there, and he was given several medications\par -he notes he had not been taking the Advair at the time of his PFT\par -he reports his breathing had been worse in [place] due to the pollution\par -he states the medication didn’t work for him\par -he reports having intermittent side pain\par -he reports his sinuses are congested and leaky, as well as in his throat\par -he reports he has been using the steroid and Singulair\par -he notes he is constantly constipated\par -he notes he lost a few pounds\par -he reports he exercise by walking 6-7 miles per day and stretching for 1 hour\par -he reports he was given a spiroflute nasal spray\par -he denies any nocturia, chest pressure, diarrhea, constipation, dysphagia, dizziness, sour taste in the mouth, leg swelling, heartburn, reflux

## 2020-02-07 NOTE — PHYSICAL EXAM
[General Appearance - Well Developed] : well developed [Normal Appearance] : normal appearance [Well Groomed] : well groomed [General Appearance - Well Nourished] : well nourished [No Deformities] : no deformities [General Appearance - In No Acute Distress] : no acute distress [Normal Conjunctiva] : the conjunctiva exhibited no abnormalities [Eyelids - No Xanthelasma] : the eyelids demonstrated no xanthelasmas [Normal Oropharynx] : normal oropharynx [II] : II [Neck Appearance] : the appearance of the neck was normal [Neck Cervical Mass (___cm)] : no neck mass was observed [Jugular Venous Distention Increased] : there was no jugular-venous distention [Thyroid Diffuse Enlargement] : the thyroid was not enlarged [Thyroid Nodule] : there were no palpable thyroid nodules [Heart Rate And Rhythm] : heart rate and rhythm were normal [Heart Sounds] : normal S1 and S2 [Murmurs] : no murmurs present [Respiration, Rhythm And Depth] : normal respiratory rhythm and effort [Exaggerated Use Of Accessory Muscles For Inspiration] : no accessory muscle use [Auscultation Breath Sounds / Voice Sounds] : lungs were clear to auscultation bilaterally [Abdomen Soft] : soft [Abdomen Tenderness] : non-tender [Abdomen Mass (___ Cm)] : no abdominal mass palpated [Abnormal Walk] : normal gait [Gait - Sufficient For Exercise Testing] : the gait was sufficient for exercise testing [Nail Clubbing] : no clubbing of the fingernails [Cyanosis, Localized] : no localized cyanosis [Petechial Hemorrhages (___cm)] : no petechial hemorrhages [Skin Color & Pigmentation] : normal skin color and pigmentation [Skin Turgor] : normal skin turgor [] : no rash [Deep Tendon Reflexes (DTR)] : deep tendon reflexes were 2+ and symmetric [Sensation] : the sensory exam was normal to light touch and pinprick [No Focal Deficits] : no focal deficits [Oriented To Time, Place, And Person] : oriented to person, place, and time [Impaired Insight] : insight and judgment were intact [Affect] : the affect was normal [FreeTextEntry1] : I:E ratio 1:3; clear.

## 2020-02-07 NOTE — ASSESSMENT
[FreeTextEntry1] : Mr. LIVINGSTON is a 75 year old male with a history of nonsmoker, benign familial tremor, BPH, chronic sinus issues, GERD, OA, low vitamin D, lumbar disc disease who now comes to the office for a follow up pulmonary evaluation for asthma, allergy, and GERD.\par \par His shortness of breath is multifactorial due to:\par -poor mechanics of breathing \par -out of shape / overweight\par -pulmonary disease\par      -Severe persistent asthma\par -cardiac disease \par \par problem 1: Severe Persistent Asthma\par - add Advair 250 1 inhalation BID\par -Add Symbicort (160) 2 inhalations BID\par - add Ventolin 2 puffs Q6H, pre-exercise\par - Asthma is believed to be caused by inherited (genetic) and environmental factor, but its exact cause is unknown. Asthma may be triggered by allergens, lung infections, or irritants in the air. Asthma triggers are different for each person \par \par Problem 1A: Elevated IgE\par -Patient is a candidate for xolair - set up today (2/7/2020)\par -Xolair is a recombinant DNA- derived humanized IgG1K monoclonal antibody that selectively binds to human immunoglobulin E (IgE). Xolair is produced by a Chinese hamster ovary cell suspension culture in nutrient medium containing the antibiotic gentamicin. Gentamicin is not detectable in the final product. Xolair is a sterile, white, preservative free, lyophilized powder contained in a single use vial that is reconstituted with sterile water for suspension. Side effects include: wheezing, tightness of the chest, trouble breathing, hives, skin rash, feeling anxious or light-headed, fainting, warmth or tingling under skin, or swelling of face, lips, or tongue \par \par Problem 2: allergies/sinus\par - continue Singulair 10 mg QHS\par - continue Olopatadine 0.6% 1 sniff BID\par - Recommended Navage\par -Add Clarinex 5 mg before bed \par - get Blood work to include: asthma panel (+), food IgE panel (+), IgE level (+: 117), eosinophil level (wnl), vitamin D level (wnl)\par - Environmental measures for allergies were encouraged including mattress and pillow covers, air purifier, and environmental controls.\par \par \par Problem 3: GERD\par - transition from Protonix 40 mg QAM, pre-breakfast to Pepcid Complete 40 mg QHS\par - Recommended organic apple cider vinegar tablets \par -Rule of 2s: avoid eating too much, eating too late, eating too spicy, eating two hours before bed.\par -Things to avoid including overeating, spicy foods, tight clothing, eating within three hours of bed, this list is not all inclusive. \par -For treatment of reflux, possible options discussed including diet control, H2 blockers, PPIs, as well as coating motility agents discussed as treatment options. Timing of meals and proximity of last meal to sleep were discussed. If symptoms persist, a formal gastrointestinal evaluation is needed. \par  \par \par problem : cardiac disease\par -recommended to continue to follow up with Cardiologist (Aarti Shaikh)\par \par problem : poor breathing mechanics\par -Proper breathing techniques were reviewed with an emphasis of exhalation. Patient instructed to breath in for 1 second and out for four seconds. Patient was encouraged to not talk while walking. \par \par problem : out of shape / overweight\par -Weight loss, exercise, and diet control were discussed and are highly encouraged. Treatment options were given such as, aqua therapy, and contacting a nutritionist. Recommended to use the elliptical, stationary bike, less use of treadmill. Mindful eating was explained to the patient Obesity is associated with worsening asthma, shortness of breath, and potential for cardiac disease, diabetes, and other underlying medical conditions\par \par problem : health maintenance \par -recommended yearly flu shot after October 15 (2019)\par -recommended strep pneumonia vaccines: Prevnar-13 vaccine, followed by Pneumo vaccine 23 one year following\par -recommended early intervention for Upper Respiratory Infections (URIs)\par -recommended regular osteoporosis evaluations\par -recommended early dermatological evaluations\par -recommended after the age of 50 to the age of 70, colonoscopy every 5 years\par \par \par F/U in 6-8 weeks.\par \par He is encouraged to call with any changes, concerns, or questions\par \par \par

## 2020-02-12 ENCOUNTER — LABORATORY RESULT (OUTPATIENT)
Age: 75
End: 2020-02-12

## 2020-02-12 LAB
BASOPHILS # BLD AUTO: 0.03 K/UL
BASOPHILS NFR BLD AUTO: 0.4 %
EOSINOPHIL # BLD AUTO: 0.14 K/UL
EOSINOPHIL NFR BLD AUTO: 2 %
HCT VFR BLD CALC: 39.9 %
HGB BLD-MCNC: 13.1 G/DL
IMM GRANULOCYTES NFR BLD AUTO: 0.3 %
LYMPHOCYTES # BLD AUTO: 2.3 K/UL
LYMPHOCYTES NFR BLD AUTO: 32.4 %
MAN DIFF?: NORMAL
MCHC RBC-ENTMCNC: 29.4 PG
MCHC RBC-ENTMCNC: 32.8 GM/DL
MCV RBC AUTO: 89.5 FL
MONOCYTES # BLD AUTO: 0.87 K/UL
MONOCYTES NFR BLD AUTO: 12.3 %
NEUTROPHILS # BLD AUTO: 3.73 K/UL
NEUTROPHILS NFR BLD AUTO: 52.6 %
PLATELET # BLD AUTO: 193 K/UL
RBC # BLD: 4.46 M/UL
RBC # FLD: 14.8 %
WBC # FLD AUTO: 7.09 K/UL

## 2020-02-13 LAB
24R-OH-CALCIDIOL SERPL-MCNC: 45.3 PG/ML
25(OH)D3 SERPL-MCNC: 57 NG/ML
A ALTERNATA IGE QN: 0.4 KUA/L
A FUMIGATUS IGE QN: 0.49 KUA/L
C ALBICANS IGE QN: 0.12 KUA/L
C HERBARUM IGE QN: 0.49 KUA/L
CAT DANDER IGE QN: <0.1 KUA/L
CLAM IGE QN: <0.1 KUA/L
CODFISH IGE QN: <0.1 KUA/L
COMMON RAGWEED IGE QN: <0.1 KUA/L
CORN IGE QN: <0.1 KUA/L
COW MILK IGE QN: 0.29 KUA/L
D FARINAE IGE QN: 0.23 KUA/L
D PTERONYSS IGE QN: 0.13 KUA/L
DEPRECATED A ALTERNATA IGE RAST QL: 1
DEPRECATED A FUMIGATUS IGE RAST QL: 1
DEPRECATED C ALBICANS IGE RAST QL: NORMAL
DEPRECATED C HERBARUM IGE RAST QL: 1
DEPRECATED CAT DANDER IGE RAST QL: 0
DEPRECATED CLAM IGE RAST QL: 0
DEPRECATED CODFISH IGE RAST QL: 0
DEPRECATED COMMON RAGWEED IGE RAST QL: 0
DEPRECATED CORN IGE RAST QL: 0
DEPRECATED COW MILK IGE RAST QL: NORMAL
DEPRECATED D FARINAE IGE RAST QL: NORMAL
DEPRECATED D PTERONYSS IGE RAST QL: NORMAL
DEPRECATED DOG DANDER IGE RAST QL: 0
DEPRECATED EGG WHITE IGE RAST QL: 0
DEPRECATED M RACEMOSUS IGE RAST QL: 2
DEPRECATED PEANUT IGE RAST QL: 0
DEPRECATED ROACH IGE RAST QL: 0
DEPRECATED SCALLOP IGE RAST QL: <0.1 KUA/L
DEPRECATED SESAME SEED IGE RAST QL: 0
DEPRECATED SHRIMP IGE RAST QL: 0
DEPRECATED SOYBEAN IGE RAST QL: 0
DEPRECATED TIMOTHY IGE RAST QL: 0
DEPRECATED WALNUT IGE RAST QL: 0
DEPRECATED WHEAT IGE RAST QL: 0
DEPRECATED WHITE OAK IGE RAST QL: 0
DOG DANDER IGE QN: <0.1 KUA/L
EGG WHITE IGE QN: <0.1 KUA/L
M RACEMOSUS IGE QN: 1.18 KUA/L
PEANUT IGE QN: <0.1 KUA/L
ROACH IGE QN: <0.1 KUA/L
SCALLOP IGE QN: 0
SCALLOP IGE QN: <0.1 KUA/L
SESAME SEED IGE QN: <0.1 KUA/L
SOYBEAN IGE QN: <0.1 KUA/L
TIMOTHY IGE QN: <0.1 KUA/L
TOTAL IGE SMQN RAST: 100 KU/L
WALNUT IGE QN: <0.1 KUA/L
WHEAT IGE QN: <0.1 KUA/L
WHITE OAK IGE QN: <0.1 KUA/L

## 2020-02-18 RX ORDER — EPINEPHRINE 0.3 MG/.3ML
0.3 INJECTION INTRAMUSCULAR
Qty: 1 | Refills: 1 | Status: ACTIVE | COMMUNITY
Start: 2020-02-18 | End: 1900-01-01

## 2020-02-19 ENCOUNTER — APPOINTMENT (OUTPATIENT)
Dept: PULMONOLOGY | Facility: CLINIC | Age: 75
End: 2020-02-19

## 2020-05-08 ENCOUNTER — APPOINTMENT (OUTPATIENT)
Dept: PULMONOLOGY | Facility: CLINIC | Age: 75
End: 2020-05-08
Payer: MEDICARE

## 2020-05-08 VITALS
OXYGEN SATURATION: 98 % | BODY MASS INDEX: 29.16 KG/M2 | SYSTOLIC BLOOD PRESSURE: 120 MMHG | DIASTOLIC BLOOD PRESSURE: 80 MMHG | HEIGHT: 65 IN | RESPIRATION RATE: 17 BRPM | HEART RATE: 61 BPM | TEMPERATURE: 98.2 F | WEIGHT: 175 LBS

## 2020-05-08 PROCEDURE — 99214 OFFICE O/P EST MOD 30 MIN: CPT

## 2020-05-08 NOTE — ASSESSMENT
[FreeTextEntry1] : Mr. LIVINGSTON is a 75 year old male with a history of nonsmoker, benign familial tremor, BPH, chronic sinus issues, GERD, OA, low vitamin D, lumbar disc disease who now comes to the office for a follow up pulmonary evaluation for asthma, allergy, and GERD.\par \par His shortness of breath is multifactorial due to:\par -poor mechanics of breathing \par -out of shape / overweight\par -pulmonary disease\par      -Severe persistent asthma\par -cardiac disease \par \par problem 1: Severe Persistent Asthma (noncompliant)\par - add Advair 250 1 inhalation BID\par -Add Symbicort (160) 2 inhalations BID\par - add Ventolin 2 puffs Q6H, pre-exercise\par - Asthma is believed to be caused by inherited (genetic) and environmental factor, but its exact cause is unknown. Asthma may be triggered by allergens, lung infections, or irritants in the air. Asthma triggers are different for each person \par \par Problem 1A: Elevated IgE\par -Patient refused Xolair \par -Xolair is a recombinant DNA- derived humanized IgG1K monoclonal antibody that selectively binds to human immunoglobulin E (IgE). Xolair is produced by a Chinese hamster ovary cell suspension culture in nutrient medium containing the antibiotic gentamicin. Gentamicin is not detectable in the final product. Xolair is a sterile, white, preservative free, lyophilized powder contained in a single use vial that is reconstituted with sterile water for suspension. Side effects include: wheezing, tightness of the chest, trouble breathing, hives, skin rash, feeling anxious or light-headed, fainting, warmth or tingling under skin, or swelling of face, lips, or tongue \par \par Problem 2: allergies/sinus\par - add Flonase 1 sniff/nostril BID\par - continue Singulair 10 mg QHS\par - continue Olopatadine 0.6% 1 sniff BID\par - Recommended Navage\par - Continue Clarinex 5 mg before bed \par - get Blood work to include: asthma panel (+), food IgE panel (+), IgE level (+: 117), eosinophil level (wnl), vitamin D level (wnl)\par - Environmental measures for allergies were encouraged including mattress and pillow covers, air purifier, and environmental controls.\par \par Problem 3: GERD (Homeopathic treatment)\par - transition from Protonix 40 mg QAM, pre-breakfast to Pepcid Complete 40 mg QHS\par - Recommended organic apple cider vinegar tablets \par -Rule of 2s: avoid eating too much, eating too late, eating too spicy, eating two hours before bed.\par -Things to avoid including overeating, spicy foods, tight clothing, eating within three hours of bed, this list is not all inclusive. \par -For treatment of reflux, possible options discussed including diet control, H2 blockers, PPIs, as well as coating motility agents discussed as treatment options. Timing of meals and proximity of last meal to sleep were discussed. If symptoms persist, a formal gastrointestinal evaluation is needed. \par \par problem 4: cardiac disease\par -recommended to continue to follow up with Cardiologist (Aarti Shaikh)\par \par problem 5: poor breathing mechanics\par -Proper breathing techniques were reviewed with an emphasis of exhalation. Patient instructed to breath in for 1 second and out for four seconds. Patient was encouraged to not talk while walking. \par \par problem 6: out of shape / overweight\par -Weight loss, exercise, and diet control were discussed and are highly encouraged. Treatment options were given such as, aqua therapy, and contacting a nutritionist. Recommended to use the elliptical, stationary bike, less use of treadmill. Mindful eating was explained to the patient Obesity is associated with worsening asthma, shortness of breath, and potential for cardiac disease, diabetes, and other underlying medical conditions\par \par Problem 7a: COVID-19 precautionary Immune Support Recommendations:\par -OTC Vitamin C 500mg BID \par -OTC Quercetin 250-500mg BID \par -OTC Zinc 75-100mg per day \par -OTC Melatonin 1 or 2mg a night \par -OTC Vitamin D 1-4000mg per day \par -OTC Tonic Water 8oz per day\par -Water 0.5-1 gallon per day\par \par \par problem 7: health maintenance \par -recommended yearly flu shot after October 15 (2019)\par -recommended strep pneumonia vaccines: Prevnar-13 vaccine, followed by Pneumo vaccine 23 one year following\par -recommended early intervention for Upper Respiratory Infections (URIs)\par -recommended regular osteoporosis evaluations\par -recommended early dermatological evaluations\par -recommended after the age of 50 to the age of 70, colonoscopy every 5 years\par \par \par F/U in 6-8 weeks.\par \par He is encouraged to call with any changes, concerns, or questions.\par

## 2020-05-08 NOTE — ADDENDUM
[FreeTextEntry1] : Documented by Pratibha Schaeffer acting as a scribe for Dr. Eron Johnson on (05/08/2020).\par \par All medical record entries made by the Scribe were at my, Dr. Eron Johnson's, direction and personally dictated by me on (05/08/2020). I have reviewed the chart and agree that the record accurately reflects my personal performance of the history, physical exam, assessment and plan. I have also personally directed, reviewed, and agree with the discharge instructions. \par \par \par

## 2020-05-08 NOTE — PHYSICAL EXAM
[Well Groomed] : well groomed [Normal Appearance] : normal appearance [General Appearance - Well Developed] : well developed [General Appearance - Well Nourished] : well nourished [No Deformities] : no deformities [Normal Conjunctiva] : the conjunctiva exhibited no abnormalities [General Appearance - In No Acute Distress] : no acute distress [Eyelids - No Xanthelasma] : the eyelids demonstrated no xanthelasmas [Normal Oropharynx] : normal oropharynx [III] : III [Neck Cervical Mass (___cm)] : no neck mass was observed [Jugular Venous Distention Increased] : there was no jugular-venous distention [Neck Appearance] : the appearance of the neck was normal [Thyroid Nodule] : there were no palpable thyroid nodules [Thyroid Diffuse Enlargement] : the thyroid was not enlarged [Heart Sounds] : normal S1 and S2 [Heart Rate And Rhythm] : heart rate and rhythm were normal [Murmurs] : no murmurs present [Respiration, Rhythm And Depth] : normal respiratory rhythm and effort [Auscultation Breath Sounds / Voice Sounds] : lungs were clear to auscultation bilaterally [Exaggerated Use Of Accessory Muscles For Inspiration] : no accessory muscle use [Abdomen Soft] : soft [Abdomen Tenderness] : non-tender [Abdomen Mass (___ Cm)] : no abdominal mass palpated [Abnormal Walk] : normal gait [Gait - Sufficient For Exercise Testing] : the gait was sufficient for exercise testing [Nail Clubbing] : no clubbing of the fingernails [Petechial Hemorrhages (___cm)] : no petechial hemorrhages [Cyanosis, Localized] : no localized cyanosis [Skin Color & Pigmentation] : normal skin color and pigmentation [Skin Turgor] : normal skin turgor [Deep Tendon Reflexes (DTR)] : deep tendon reflexes were 2+ and symmetric [Sensation] : the sensory exam was normal to light touch and pinprick [] : no rash [Oriented To Time, Place, And Person] : oriented to person, place, and time [No Focal Deficits] : no focal deficits [Affect] : the affect was normal [Impaired Insight] : insight and judgment were intact [FreeTextEntry1] : I:E ratio 1:3; clear.

## 2020-05-08 NOTE — HISTORY OF PRESENT ILLNESS
[FreeTextEntry1] : Mr. Rogers is a 75 year old male with a history of asthma, GERD, SOB, and vitamin D deficiency presenting today for a follow up pulmonary evaluation. His chief complaint is \par - He is just using Flonase but no inhalers\par - His nose is congested\par - He is having headaches\par - He has heartburn and reflux \par - He is taking Honey with cinnamon for his reflux as well as aloe vera \par - He is producing some mucus but not a lot \par - He is coughing \par - denies any headaches, nausea, vomiting, fever, chills, sweats, chest pain, chest pressure, diarrhea, constipation, dysphagia, dizziness, leg swelling, leg pain, itchy eyes, itchy ears\par \par \par

## 2020-08-28 DIAGNOSIS — Z00.00 ENCOUNTER FOR GENERAL ADULT MEDICAL EXAMINATION W/OUT ABNORMAL FINDINGS: ICD-10-CM

## 2020-09-02 ENCOUNTER — APPOINTMENT (OUTPATIENT)
Dept: ORTHOPEDIC SURGERY | Facility: CLINIC | Age: 75
End: 2020-09-02
Payer: COMMERCIAL

## 2020-09-02 VITALS — HEIGHT: 65 IN | TEMPERATURE: 97.4 F | WEIGHT: 175 LBS | BODY MASS INDEX: 29.16 KG/M2

## 2020-09-02 PROCEDURE — 99213 OFFICE O/P EST LOW 20 MIN: CPT

## 2020-09-03 LAB
SARS-COV-2 IGG SERPL IA-ACNC: <3.8 AU/ML
SARS-COV-2 IGG SERPL IA-ACNC: <3.8 AU/ML
SARS-COV-2 IGG SERPL QL IA: NEGATIVE
SARS-COV-2 IGG SERPL QL IA: NEGATIVE
SARS-COV-2 N GENE NPH QL NAA+PROBE: NOT DETECTED
SARS-COV-2 N GENE NPH QL NAA+PROBE: NOT DETECTED

## 2020-09-04 ENCOUNTER — APPOINTMENT (OUTPATIENT)
Dept: PULMONOLOGY | Facility: CLINIC | Age: 75
End: 2020-09-04
Payer: MEDICARE

## 2020-09-04 VITALS
DIASTOLIC BLOOD PRESSURE: 60 MMHG | OXYGEN SATURATION: 97 % | SYSTOLIC BLOOD PRESSURE: 110 MMHG | HEIGHT: 65 IN | HEART RATE: 97 BPM | TEMPERATURE: 97.2 F | WEIGHT: 175 LBS | RESPIRATION RATE: 17 BRPM | BODY MASS INDEX: 29.16 KG/M2

## 2020-09-04 DIAGNOSIS — Z23 ENCOUNTER FOR IMMUNIZATION: ICD-10-CM

## 2020-09-04 PROCEDURE — 94618 PULMONARY STRESS TESTING: CPT

## 2020-09-04 PROCEDURE — 95012 NITRIC OXIDE EXP GAS DETER: CPT

## 2020-09-04 PROCEDURE — 90732 PPSV23 VACC 2 YRS+ SUBQ/IM: CPT

## 2020-09-04 PROCEDURE — 99214 OFFICE O/P EST MOD 30 MIN: CPT | Mod: 25

## 2020-09-04 PROCEDURE — G0009: CPT

## 2020-09-04 RX ORDER — FLUTICASONE PROPIONATE 50 UG/1
50 SPRAY, METERED NASAL
Qty: 16 | Refills: 0 | Status: ACTIVE | COMMUNITY
Start: 2019-12-16

## 2020-09-04 NOTE — ASSESSMENT
[FreeTextEntry1] : Mr. LIVINGSTON is a 75 year old male with a history of nonsmoker, benign familial tremor, BPH, chronic sinus issues, GERD, OA, low vitamin D, lumbar disc disease who now comes to the office for a follow up pulmonary evaluation for asthma, allergy, and GERD.- noncompliant\par \par His shortness of breath is multifactorial due to:\par -poor mechanics of breathing \par -out of shape / overweight\par -pulmonary disease\par      -Severe persistent asthma\par -cardiac disease \par \par problem 1: Severe Persistent Asthma (noncompliant)\par -continue Symbicort (160) 2 inhalations BID (generic)\par -continue Ventolin 2 puffs Q6H, pre-exercise\par - Asthma is believed to be caused by inherited (genetic) and environmental factor, but its exact cause is unknown. Asthma may be triggered by allergens, lung infections, or irritants in the air. Asthma triggers are different for each person \par \par Problem 1A: Elevated IgE\par -Patient refused Xolair \par -Xolair is a recombinant DNA- derived humanized IgG1K monoclonal antibody that selectively binds to human immunoglobulin E (IgE). Xolair is produced by a Chinese hamster ovary cell suspension culture in nutrient medium containing the antibiotic gentamicin. Gentamicin is not detectable in the final product. Xolair is a sterile, white, preservative free, lyophilized powder contained in a single use vial that is reconstituted with sterile water for suspension. Side effects include: wheezing, tightness of the chest, trouble breathing, hives, skin rash, feeling anxious or light-headed, fainting, warmth or tingling under skin, or swelling of face, lips, or tongue \par \par Problem 2: allergies/sinus\par - continue Flonase 1 sniff/nostril BID\par - continue Singulair 10 mg QHS\par - continue Olopatadine 0.6% 1 sniff BID\par - Recommended Navage\par - Continue Clarinex 5 mg before bed \par - s/p Blood work to include: asthma panel (+), food IgE panel (+), IgE level (+: 117), eosinophil level (wnl), vitamin D level (wnl)\par - Environmental measures for allergies were encouraged including mattress and pillow covers, air purifier, and environmental controls.\par \par Problem 3: GERD (Homeopathic treatment)\par - transition from Protonix 40 mg QAM, pre-breakfast to Pepcid 40 mg QHS\par - Recommended organic apple cider vinegar tablets \par -Rule of 2s: avoid eating too much, eating too late, eating too spicy, eating two hours before bed.\par -Things to avoid including overeating, spicy foods, tight clothing, eating within three hours of bed, this list is not all inclusive. \par -For treatment of reflux, possible options discussed including diet control, H2 blockers, PPIs, as well as coating motility agents discussed as treatment options. Timing of meals and proximity of last meal to sleep were discussed. If symptoms persist, a formal gastrointestinal evaluation is needed. \par \par problem 4: cardiac disease\par -recommended to continue to follow up with Cardiologist (Aarti Shaikh)\par \par problem 5: poor breathing mechanics\par -Proper breathing techniques were reviewed with an emphasis of exhalation. Patient instructed to breath in for 1 second and out for four seconds. Patient was encouraged to not talk while walking. \par \par problem 6: out of shape / overweight\par -Weight loss, exercise, and diet control were discussed and are highly encouraged. Treatment options were given such as, aqua therapy, and contacting a nutritionist. Recommended to use the elliptical, stationary bike, less use of treadmill. Mindful eating was explained to the patient Obesity is associated with worsening asthma, shortness of breath, and potential for cardiac disease, diabetes, and other underlying medical conditions\par \par Problem 7a: COVID-19 precautionary Immune Support Recommendations:\par -OTC Vitamin C 500mg BID \par -OTC Quercetin 250-500mg BID \par -OTC Zinc 75-100mg per day \par -OTC Melatonin 1 or 2mg a night \par -OTC Vitamin D 1-4000mg per day \par -OTC Tonic Water 8oz per day\par -Water 0.5-1 gallon per day\par \par problem 7: health maintenance \par -recommended yearly flu shot after October 15 (2019)\par -recommended strep pneumonia vaccines: Prevnar-13 vaccine (2019), followed by Pneumo vaccine 23 one year following (9/2020)\par -recommended early intervention for Upper Respiratory Infections (URIs)\par -recommended regular osteoporosis evaluations\par -recommended early dermatological evaluations\par -recommended after the age of 50 to the age of 70, colonoscopy every 5 years\par \par \par F/U in 6-8 weeks.\par \par He is encouraged to call with any changes, concerns, or questions.\par

## 2020-09-04 NOTE — PROCEDURE
[FreeTextEntry1] : 6 minute walk test reveals a low saturation of 95% with severe evidence of dyspnea or fatigue; walked 586.8   meters\par  \par FENO was 11; a normal value being less than 25\par Fractional exhaled nitric oxide (FENO) is regarded as a simple, noninvasive method for assessing eosinophilic airway inflammation. Produced by a variety of cells within the lung, nitric oxide (NO) concentrations are generally low in healthy individuals. However, high concentrations of NO appear to be involved in nonspecific host defense mechanisms and chronic inflammatory diseases such as asthma. The American Thoracic Society (ATS) therefore has recommended using FENO to aid in the diagnosis and monitoring of eosinophilic airway inflammation and asthma, and for identifying steroid responsive individuals whose chronic respiratory symptoms may be caused by airway inflammation. \par

## 2020-09-04 NOTE — PHYSICAL EXAM
[General Appearance - Well Developed] : well developed [Normal Appearance] : normal appearance [Well Groomed] : well groomed [General Appearance - Well Nourished] : well nourished [General Appearance - In No Acute Distress] : no acute distress [No Deformities] : no deformities [Normal Conjunctiva] : the conjunctiva exhibited no abnormalities [Normal Oropharynx] : normal oropharynx [Eyelids - No Xanthelasma] : the eyelids demonstrated no xanthelasmas [Neck Appearance] : the appearance of the neck was normal [Neck Cervical Mass (___cm)] : no neck mass was observed [Thyroid Diffuse Enlargement] : the thyroid was not enlarged [Jugular Venous Distention Increased] : there was no jugular-venous distention [Thyroid Nodule] : there were no palpable thyroid nodules [Heart Rate And Rhythm] : heart rate and rhythm were normal [Heart Sounds] : normal S1 and S2 [Murmurs] : no murmurs present [Respiration, Rhythm And Depth] : normal respiratory rhythm and effort [Exaggerated Use Of Accessory Muscles For Inspiration] : no accessory muscle use [Abdomen Soft] : soft [Auscultation Breath Sounds / Voice Sounds] : lungs were clear to auscultation bilaterally [Abdomen Tenderness] : non-tender [Abnormal Walk] : normal gait [Gait - Sufficient For Exercise Testing] : the gait was sufficient for exercise testing [Abdomen Mass (___ Cm)] : no abdominal mass palpated [Nail Clubbing] : no clubbing of the fingernails [Cyanosis, Localized] : no localized cyanosis [Petechial Hemorrhages (___cm)] : no petechial hemorrhages [Skin Color & Pigmentation] : normal skin color and pigmentation [] : no rash [Skin Turgor] : normal skin turgor [Deep Tendon Reflexes (DTR)] : deep tendon reflexes were 2+ and symmetric [No Focal Deficits] : no focal deficits [Sensation] : the sensory exam was normal to light touch and pinprick [Impaired Insight] : insight and judgment were intact [Affect] : the affect was normal [Oriented To Time, Place, And Person] : oriented to person, place, and time [II] : II [FreeTextEntry1] : I:E ratio 1:3; clear.

## 2020-09-04 NOTE — HISTORY OF PRESENT ILLNESS
[FreeTextEntry1] : Mr. Rogers is a 75 year old male with a history of asthma, GERD, SOB, and vitamin D deficiency presenting today for a follow up pulmonary evaluation. His chief complaint is \par \par -he notes episode of intermittent fever, not going above 99, now resolved\par -he notes episode of lowered oxygen, now resolved\par -he notes heartburn and reflux are controlled\par -he denies visual issues\par -he notes senses of smell and taste are good\par -he notes exercising walking daily, cycling, swimming in pool\par -he notes sleeping well\par -he notes burning urination onset 5 days ago\par -he denies foul smelling urine\par -he notes urine white in color\par -he denies blood in urine\par -he notes sinus pressure and congestion upon awakening, alleviated with sinus rinse\par -he notes PND and globus sensation\par -he denies taking any new medications, vitamins, or supplements\par \par -denies any headaches, nausea, vomiting, fever, chills, sweats, chest pain, chest pressure, diarrhea, constipation, dysphagia, dizziness, leg swelling, leg pain, itchy eyes, itchy ears, heartburn, reflux, or sour taste in the mouth.\par \par   ambulatory

## 2020-09-04 NOTE — ADDENDUM
[FreeTextEntry1] : Documented by Harry Sarmiento acting as a scribe for Dr. Eron Johnson on 09/04/2020.\par \par All medical record entries made by the Scribe were at my, Dr. Eron Johnson's, direction and personally dictated by me on 09/04/2020 . I have reviewed the chart and agree that the record accurately reflects my personal performance of the history, physical exam, assessment and plan. I have also personally directed, reviewed, and agree with the discharge instructions. \par  \par \par

## 2020-09-08 ENCOUNTER — TRANSCRIPTION ENCOUNTER (OUTPATIENT)
Age: 75
End: 2020-09-08

## 2020-09-10 ENCOUNTER — APPOINTMENT (OUTPATIENT)
Dept: UROLOGY | Facility: CLINIC | Age: 75
End: 2020-09-10
Payer: MEDICARE

## 2020-09-10 VITALS — TEMPERATURE: 97.7 F

## 2020-09-10 PROCEDURE — 88112 CYTOPATH CELL ENHANCE TECH: CPT | Mod: 26

## 2020-09-10 PROCEDURE — 99214 OFFICE O/P EST MOD 30 MIN: CPT

## 2020-09-10 NOTE — PHYSICAL EXAM
[General Appearance - Well Developed] : well developed [General Appearance - Well Nourished] : well nourished [Normal Appearance] : normal appearance [General Appearance - In No Acute Distress] : no acute distress [Well Groomed] : well groomed [Abdomen Soft] : soft [Abdomen Tenderness] : non-tender [Costovertebral Angle Tenderness] : no ~M costovertebral angle tenderness [Urethral Meatus] : meatus normal [Scrotum] : the scrotum was normal [Testes Mass (___cm)] : there were no testicular masses [Urinary Bladder Findings] : the bladder was normal on palpation [No Prostate Nodules] : no prostate nodules [] : no respiratory distress [Edema] : no peripheral edema [Oriented To Time, Place, And Person] : oriented to person, place, and time [Respiration, Rhythm And Depth] : normal respiratory rhythm and effort [Exaggerated Use Of Accessory Muscles For Inspiration] : no accessory muscle use [Mood] : the mood was normal [Affect] : the affect was normal [Not Anxious] : not anxious [No Focal Deficits] : no focal deficits [Normal Station and Gait] : the gait and station were normal for the patient's age [No Palpable Adenopathy] : no palpable adenopathy

## 2020-09-11 LAB
APPEARANCE: CLEAR
BACTERIA UR CULT: NORMAL
BACTERIA: NEGATIVE
BILIRUBIN URINE: NEGATIVE
BLOOD URINE: NEGATIVE
COLOR: NORMAL
GLUCOSE QUALITATIVE U: NEGATIVE
HYALINE CASTS: 0 /LPF
KETONES URINE: NEGATIVE
LEUKOCYTE ESTERASE URINE: NEGATIVE
MICROSCOPIC-UA: NORMAL
NITRITE URINE: NEGATIVE
PH URINE: 6.5
PROTEIN URINE: NEGATIVE
PSA FREE FLD-MCNC: 9 %
PSA FREE SERPL-MCNC: 0.66 NG/ML
PSA SERPL-MCNC: 7.28 NG/ML
RED BLOOD CELLS URINE: 1 /HPF
SPECIFIC GRAVITY URINE: 1.02
SQUAMOUS EPITHELIAL CELLS: 1 /HPF
UROBILINOGEN URINE: NORMAL
WHITE BLOOD CELLS URINE: 5 /HPF

## 2020-09-14 ENCOUNTER — OUTPATIENT (OUTPATIENT)
Dept: OUTPATIENT SERVICES | Facility: HOSPITAL | Age: 75
LOS: 1 days | End: 2020-09-14
Payer: COMMERCIAL

## 2020-09-14 ENCOUNTER — APPOINTMENT (OUTPATIENT)
Dept: MRI IMAGING | Facility: CLINIC | Age: 75
End: 2020-09-14
Payer: COMMERCIAL

## 2020-09-14 DIAGNOSIS — Z00.8 ENCOUNTER FOR OTHER GENERAL EXAMINATION: ICD-10-CM

## 2020-09-14 DIAGNOSIS — Z98.890 OTHER SPECIFIED POSTPROCEDURAL STATES: Chronic | ICD-10-CM

## 2020-09-14 DIAGNOSIS — Z98.49 CATARACT EXTRACTION STATUS, UNSPECIFIED EYE: Chronic | ICD-10-CM

## 2020-09-14 LAB — URINE CYTOLOGY: NORMAL

## 2020-09-14 PROCEDURE — 72148 MRI LUMBAR SPINE W/O DYE: CPT

## 2020-09-14 PROCEDURE — 72148 MRI LUMBAR SPINE W/O DYE: CPT | Mod: 26

## 2020-09-16 ENCOUNTER — RX RENEWAL (OUTPATIENT)
Age: 75
End: 2020-09-16

## 2020-09-16 ENCOUNTER — APPOINTMENT (OUTPATIENT)
Dept: OTOLARYNGOLOGY | Facility: CLINIC | Age: 75
End: 2020-09-16
Payer: MEDICARE

## 2020-09-16 VITALS
WEIGHT: 175 LBS | TEMPERATURE: 97.6 F | HEIGHT: 66 IN | HEART RATE: 62 BPM | SYSTOLIC BLOOD PRESSURE: 120 MMHG | BODY MASS INDEX: 28.12 KG/M2 | DIASTOLIC BLOOD PRESSURE: 75 MMHG

## 2020-09-16 PROCEDURE — 31231 NASAL ENDOSCOPY DX: CPT

## 2020-09-16 PROCEDURE — 99204 OFFICE O/P NEW MOD 45 MIN: CPT | Mod: 25

## 2020-09-16 RX ORDER — FLUTICASONE PROPIONATE 50 UG/1
50 SPRAY, METERED NASAL DAILY
Qty: 48 | Refills: 0 | Status: ACTIVE | COMMUNITY
Start: 2020-09-16 | End: 1900-01-01

## 2020-09-16 NOTE — ASSESSMENT
[FreeTextEntry1] : Mr. LIVINGSTON is a 75 year male with likely chronic sinusitis. \par - restart flonase\par - after a few days restart huy meds rinses\par - if still unable to rinse due to congesiton after a week- call and will send in medrol dose pack\par - f/up 6 weeks to re-evaluate.  May need CT scan. \par - hold off on abx for now, likely chronic and  currently on abx for UTI

## 2020-09-16 NOTE — CONSULT LETTER
[Dear  ___] : Dear  [unfilled], [Consult Letter:] : I had the pleasure of evaluating your patient, [unfilled]. [Please see my note below.] : Please see my note below. [Consult Closing:] : Thank you very much for allowing me to participate in the care of this patient.  If you have any questions, please do not hesitate to contact me. [FreeTextEntry3] : Sincerely, \par \par Eufemia Sears MD\par Otolaryngology- Facial Plastics \par [address]\[place] [address]\par (P) - [phone]\par (F) - [phone]

## 2020-09-16 NOTE — PROCEDURE
[FreeTextEntry6] : reason for exam: anterior rhinoscopy insufficient for symptom evaluation \par \par Fiberoptic nasal endoscopy was performed.  R/b/a of procedure was explained to the patient and they agreed to proceed with procedure.  Nasal cavities were treated with afrin and lidocaine prior to nasal endoscopy to make the patient more comfortable.  Significant factors noted:ERYTHEMA AND CRUSTING OF MIDDLE TURBINATES B/L  Otherwise normal mucosa, normal b/l inferior, middle and superior turbinates, inferior, middle and superior meati and sphenoethmoidal recess.  No nasal polyps.  Septum midline\par \par scope #: 23

## 2020-09-16 NOTE — HISTORY OF PRESENT ILLNESS
[de-identified] : Mr. LIVINGSTON is a 75 year male with a longstanding h/o sinus issues for many years. \par H/o mild COPD, mild GERD \par sees Dr. Johnson for pulmonary, was given Breo.  But has not been using\par Uses flonase rarely and uses huy med rinses one per day normally but has not been using lately bc feeling frontal headache. 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 \par + PND and cough, + morning phlegm, + b/l nasal obstruction and facial pressure, sometimes with clear rhinorrhea\par denies fevers or chills. feels the PND significantly exacerbates her cough

## 2020-09-17 LAB
APPEARANCE: CLEAR
BACTERIA: NEGATIVE
BILIRUBIN URINE: NEGATIVE
BLOOD URINE: NEGATIVE
COLOR: NORMAL
GLUCOSE QUALITATIVE U: NEGATIVE
HYALINE CASTS: 0 /LPF
KETONES URINE: NEGATIVE
LEUKOCYTE ESTERASE URINE: NEGATIVE
MICROSCOPIC-UA: NORMAL
NITRITE URINE: NEGATIVE
PH URINE: 6
PROTEIN URINE: NEGATIVE
RED BLOOD CELLS URINE: 0 /HPF
SPECIFIC GRAVITY URINE: 1.01
SQUAMOUS EPITHELIAL CELLS: 1 /HPF
UROBILINOGEN URINE: NORMAL
WHITE BLOOD CELLS URINE: 1 /HPF

## 2020-09-18 LAB — BACTERIA UR CULT: NORMAL

## 2020-09-23 ENCOUNTER — APPOINTMENT (OUTPATIENT)
Dept: ORTHOPEDIC SURGERY | Facility: CLINIC | Age: 75
End: 2020-09-23
Payer: MEDICARE

## 2020-09-23 VITALS — BODY MASS INDEX: 28.12 KG/M2 | TEMPERATURE: 98 F | WEIGHT: 175 LBS | HEIGHT: 66 IN
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
## 2020-09-23 DIAGNOSIS — N28.1 CYST OF KIDNEY, ACQUIRED: ICD-10-CM

## 2020-09-23 PROCEDURE — 99214 OFFICE O/P EST MOD 30 MIN: CPT

## 2020-10-02 ENCOUNTER — OUTPATIENT (OUTPATIENT)
Dept: OUTPATIENT SERVICES | Facility: HOSPITAL | Age: 75
LOS: 1 days | End: 2020-10-02
Payer: MEDICARE

## 2020-10-02 ENCOUNTER — APPOINTMENT (OUTPATIENT)
Dept: ULTRASOUND IMAGING | Facility: CLINIC | Age: 75
End: 2020-10-02
Payer: MEDICARE

## 2020-10-02 DIAGNOSIS — Z98.890 OTHER SPECIFIED POSTPROCEDURAL STATES: Chronic | ICD-10-CM

## 2020-10-02 DIAGNOSIS — Z00.8 ENCOUNTER FOR OTHER GENERAL EXAMINATION: ICD-10-CM

## 2020-10-02 DIAGNOSIS — Z98.49 CATARACT EXTRACTION STATUS, UNSPECIFIED EYE: Chronic | ICD-10-CM

## 2020-10-02 PROCEDURE — 76775 US EXAM ABDO BACK WALL LIM: CPT | Mod: 26

## 2020-10-02 PROCEDURE — 76775 US EXAM ABDO BACK WALL LIM: CPT

## 2020-11-03 ENCOUNTER — RX RENEWAL (OUTPATIENT)
Age: 75
End: 2020-11-03

## 2020-11-30 ENCOUNTER — APPOINTMENT (OUTPATIENT)
Dept: CARDIOLOGY | Facility: CLINIC | Age: 75
End: 2020-11-30

## 2020-12-04 ENCOUNTER — APPOINTMENT (OUTPATIENT)
Dept: PULMONOLOGY | Facility: CLINIC | Age: 75
End: 2020-12-04
Payer: MEDICARE

## 2020-12-04 VITALS
DIASTOLIC BLOOD PRESSURE: 68 MMHG | WEIGHT: 182 LBS | RESPIRATION RATE: 17 BRPM | SYSTOLIC BLOOD PRESSURE: 120 MMHG | HEART RATE: 77 BPM | BODY MASS INDEX: 29.25 KG/M2 | OXYGEN SATURATION: 99 % | TEMPERATURE: 97.8 F | HEIGHT: 66 IN

## 2020-12-04 PROCEDURE — 99214 OFFICE O/P EST MOD 30 MIN: CPT

## 2020-12-04 NOTE — ADDENDUM
[FreeTextEntry1] : Documented by Robert Hutchison acting as a scribe for Dr. Eron Johnson on 12/04/2020 \par \par All medical record entries made by the Scribe were at my, Dr. Eron Johnson's, direction and personally dictated by me on 12/04/2020 . I have reviewed the chart and agree that the record accurately reflects my personal performance of the history, physical exam, assessment and plan. I have also personally directed, reviewed, and agree with the discharge instructions.

## 2020-12-04 NOTE — PHYSICAL EXAM
[General Appearance - Well Developed] : well developed [Normal Appearance] : normal appearance [Well Groomed] : well groomed [General Appearance - Well Nourished] : well nourished [No Deformities] : no deformities [General Appearance - In No Acute Distress] : no acute distress [Normal Conjunctiva] : the conjunctiva exhibited no abnormalities [Eyelids - No Xanthelasma] : the eyelids demonstrated no xanthelasmas [Normal Oropharynx] : normal oropharynx [III] : III [Neck Appearance] : the appearance of the neck was normal [Neck Cervical Mass (___cm)] : no neck mass was observed [Jugular Venous Distention Increased] : there was no jugular-venous distention [Thyroid Diffuse Enlargement] : the thyroid was not enlarged [Thyroid Nodule] : there were no palpable thyroid nodules [Heart Rate And Rhythm] : heart rate and rhythm were normal [Heart Sounds] : normal S1 and S2 [Murmurs] : no murmurs present [Respiration, Rhythm And Depth] : normal respiratory rhythm and effort [Exaggerated Use Of Accessory Muscles For Inspiration] : no accessory muscle use [Auscultation Breath Sounds / Voice Sounds] : lungs were clear to auscultation bilaterally [Abdomen Soft] : soft [Abdomen Tenderness] : non-tender [Abdomen Mass (___ Cm)] : no abdominal mass palpated [Abnormal Walk] : normal gait [Gait - Sufficient For Exercise Testing] : the gait was sufficient for exercise testing [Nail Clubbing] : no clubbing of the fingernails [Cyanosis, Localized] : no localized cyanosis [Petechial Hemorrhages (___cm)] : no petechial hemorrhages [Skin Color & Pigmentation] : normal skin color and pigmentation [Skin Turgor] : normal skin turgor [] : no rash [Deep Tendon Reflexes (DTR)] : deep tendon reflexes were 2+ and symmetric [Sensation] : the sensory exam was normal to light touch and pinprick [No Focal Deficits] : no focal deficits [Oriented To Time, Place, And Person] : oriented to person, place, and time [Impaired Insight] : insight and judgment were intact [Affect] : the affect was normal [FreeTextEntry1] : I:E ratio 1:3; clear.

## 2020-12-04 NOTE — ASSESSMENT
[FreeTextEntry1] : Mr. LIVINGSTON is a 75 year old male with a history of nonsmoker, benign familial tremor, BPH, chronic sinus issues, GERD, OA, low vitamin D, lumbar disc disease who now comes to the office for a follow up pulmonary evaluation for asthma, allergy, and GERD.- noncompliant - #1 issue is Bloating. \par \par His shortness of breath is multifactorial due to:\par -poor mechanics of breathing \par -out of shape / overweight\par -pulmonary disease\par      -Severe persistent asthma\par -cardiac disease \par \par problem 1: Severe Persistent Asthma (noncompliant)\par -continue Symbicort (160) 2 inhalations BID (generic)\par -continue Ventolin 2 puffs Q6H, pre-exercise\par - Asthma is believed to be caused by inherited (genetic) and environmental factor, but its exact cause is unknown. Asthma may be triggered by allergens, lung infections, or irritants in the air. Asthma triggers are different for each person \par \par Problem 1A: Elevated IgE\par -Patient refused Xolair \par -Xolair is a recombinant DNA- derived humanized IgG1K monoclonal antibody that selectively binds to human immunoglobulin E (IgE). Xolair is produced by a Chinese hamster ovary cell suspension culture in nutrient medium containing the antibiotic gentamicin. Gentamicin is not detectable in the final product. Xolair is a sterile, white, preservative free, lyophilized powder contained in a single use vial that is reconstituted with sterile water for suspension. Side effects include: wheezing, tightness of the chest, trouble breathing, hives, skin rash, feeling anxious or light-headed, fainting, warmth or tingling under skin, or swelling of face, lips, or tongue \par \par Problem 2: allergies/sinus\par - continue Flonase 1 sniff/nostril BID\par - continue Singulair 10 mg QHS\par - continue Olopatadine 0.6% 1 sniff BID\par - Recommended Navage\par - Continue Clarinex 5 mg before bed \par - s/p Blood work to include: asthma panel (+), food IgE panel (+), IgE level (+: 117), eosinophil level (wnl), vitamin D level (wnl)\par - Environmental measures for allergies were encouraged including mattress and pillow covers, air purifier, and environmental controls.\par \par Problem 3: GERD (Homeopathic treatment)\par - transition from Protonix 40 mg QAM, pre-breakfast to Pepcid 40 mg QHS\par - Recommended organic apple cider vinegar tablets \par -Kefir yogurt, hold on supplements. \par -Rule of 2s: avoid eating too much, eating too late, eating too spicy, eating two hours before bed.\par -Things to avoid including overeating, spicy foods, tight clothing, eating within three hours of bed, this list is not all inclusive. \par -For treatment of reflux, possible options discussed including diet control, H2 blockers, PPIs, as well as coating motility agents discussed as treatment options. Timing of meals and proximity of last meal to sleep were discussed. If symptoms persist, a formal gastrointestinal evaluation is needed. \par \par problem 4: cardiac disease\par -recommended to continue to follow up with Cardiologist (Aarti Shaikh)\par \par problem 5: poor breathing mechanics\par -Proper breathing techniques were reviewed with an emphasis of exhalation. Patient instructed to breath in for 1 second and out for four seconds. Patient was encouraged to not talk while walking. \par \par problem 6: out of shape / overweight\par -Weight loss, exercise, and diet control were discussed and are highly encouraged. Treatment options were given such as, aqua therapy, and contacting a nutritionist. Recommended to use the elliptical, stationary bike, less use of treadmill. Mindful eating was explained to the patient Obesity is associated with worsening asthma, shortness of breath, and potential for cardiac disease, diabetes, and other underlying medical conditions\par \par Problem 7a: COVID-19 precautionary Immune Support Recommendations:\par -OTC Vitamin C 500mg BID \par -OTC Quercetin 250-500mg BID \par -OTC Zinc 75-100mg per day \par -OTC Melatonin 1 or 2mg a night \par -OTC Vitamin D 1-4000mg per day \par -OTC Tonic Water 8oz per day\par -Water 0.5-1 gallon per day\par \par problem 7: health maintenance \par -recommended yearly flu shot after October 15 (2020)\par -recommended strep pneumonia vaccines: Prevnar-13 vaccine (2019), followed by Pneumo vaccine 23 one year following (9/2020)\par -recommended early intervention for Upper Respiratory Infections (URIs)\par -recommended regular osteoporosis evaluations\par -recommended early dermatological evaluations\par -recommended after the age of 50 to the age of 70, colonoscopy every 5 years\par \par F/U in 6-8 weeks.\par He is encouraged to call with any changes, concerns, or questions.

## 2020-12-04 NOTE — HISTORY OF PRESENT ILLNESS
[FreeTextEntry1] : Mr. Rogers is a 75 year old male with a history of asthma, GERD, SOB, and vitamin D deficiency presenting today for a follow up pulmonary evaluation. His chief complaint is abdominal discomfort. \par -he notes that he is having abdominal discomfort that comes from the bottom. \par -he reports that he went to the gastro and has been advised to take GERD medication. \par -he states that he does not constantly get GERD Sx. \par -reports diarrhea sometimes. \par -has been getting enough sleep. \par -he reports that his weight is stable \par -he notes that His bowels are regular. \par -denies coughing \par -reports wheezing a little in the morning. \par -has not been taking any of his inhalers. Has only been using the nasal spray. \par -reports back pain \par -denies leg swelling/ leg pain. \par -is s/p MRI and has been told that he is suffering some arthritis. \par -reports feeling gassy at times. \par -he has been taking Probiotics. \par -Gas Rx have not helped him. \par -Abdominal Pain has been present for the past month. \par -has been taking Calcium supplements, B12 vitamin, and multivitamins. \par -reports stretching and walks 4 miles as a form of exercise. \par -he notes that he exercises more when he is in [place]. \par -his wife has not told him that he snores.\par -he reports taking medication for prostate problem. \par -no other changes with his medications. \par -he does eats yogurt. \par \par -He denies any chest pain, chest pressure, constipation, dysphagia, dizziness, sour taste in the mouth, leg swelling, leg pain, itchy eyes, itchy ears.

## 2021-01-31 LAB
PSA FREE FLD-MCNC: 18 %
PSA FREE SERPL-MCNC: 0.43 NG/ML
PSA SERPL-MCNC: 2.38 NG/ML

## 2021-02-24 NOTE — PATIENT PROFILE ADULT - BRADEN NUTRITION
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    Plan:    I have sent an email to you to get updated photo's of your arm and will place in your chart once sent.    We will contact you to get an appointment with the [facility].    Please don't hesitate to reach out with questions or concerns,     GERMAINE Archer RN, BSN  Interventional Radiology Nurse Coordinator   Phone:  [phone]    
(4) excellent

## 2021-04-27 ENCOUNTER — APPOINTMENT (OUTPATIENT)
Dept: ORTHOPEDIC SURGERY | Facility: CLINIC | Age: 76
End: 2021-04-27
Payer: MEDICARE

## 2021-04-27 VITALS — SYSTOLIC BLOOD PRESSURE: 122 MMHG | HEART RATE: 67 BPM | TEMPERATURE: 97.8 F | DIASTOLIC BLOOD PRESSURE: 72 MMHG

## 2021-04-27 PROCEDURE — 99215 OFFICE O/P EST HI 40 MIN: CPT

## 2021-04-27 PROCEDURE — 73562 X-RAY EXAM OF KNEE 3: CPT | Mod: LT

## 2021-04-29 ENCOUNTER — APPOINTMENT (OUTPATIENT)
Dept: UROLOGY | Facility: CLINIC | Age: 76
End: 2021-04-29
Payer: MEDICARE

## 2021-04-29 ENCOUNTER — APPOINTMENT (OUTPATIENT)
Dept: OTOLARYNGOLOGY | Facility: CLINIC | Age: 76
End: 2021-04-29
Payer: MEDICARE

## 2021-04-29 VITALS
DIASTOLIC BLOOD PRESSURE: 64 MMHG | SYSTOLIC BLOOD PRESSURE: 98 MMHG | WEIGHT: 175 LBS | HEART RATE: 76 BPM | TEMPERATURE: 97.4 F | BODY MASS INDEX: 27.47 KG/M2 | HEIGHT: 67 IN

## 2021-04-29 PROCEDURE — 99214 OFFICE O/P EST MOD 30 MIN: CPT

## 2021-04-29 NOTE — ASSESSMENT
[FreeTextEntry1] : With postnasal drip symptoms seem to be uncontrolled with Flonase in the morning and sinus rinses at night.  He does continue to have postnasal drip that kind of fluctuates in intensity but is not interested in any further work-up or any other intervention and I agree.  I recommend he follow-up with us as needed.

## 2021-04-30 LAB
APPEARANCE: CLEAR
BACTERIA: NEGATIVE
BILIRUBIN URINE: NEGATIVE
BLOOD URINE: NEGATIVE
COLOR: COLORLESS
GLUCOSE QUALITATIVE U: NEGATIVE
HYALINE CASTS: 0 /LPF
KETONES URINE: NEGATIVE
LEUKOCYTE ESTERASE URINE: NEGATIVE
MICROSCOPIC-UA: NORMAL
NITRITE URINE: NEGATIVE
PH URINE: 7
PROTEIN URINE: NEGATIVE
PSA FREE FLD-MCNC: 29 %
PSA FREE SERPL-MCNC: 0.31 NG/ML
PSA SERPL-MCNC: 1.06 NG/ML
RED BLOOD CELLS URINE: 0 /HPF
SPECIFIC GRAVITY URINE: 1
SQUAMOUS EPITHELIAL CELLS: 0 /HPF
UROBILINOGEN URINE: NORMAL
WHITE BLOOD CELLS URINE: 0 /HPF

## 2021-05-11 ENCOUNTER — APPOINTMENT (OUTPATIENT)
Dept: ORTHOPEDIC SURGERY | Facility: CLINIC | Age: 76
End: 2021-05-11
Payer: MEDICARE

## 2021-05-11 VITALS — DIASTOLIC BLOOD PRESSURE: 82 MMHG | TEMPERATURE: 97.3 F | SYSTOLIC BLOOD PRESSURE: 136 MMHG | HEART RATE: 61 BPM

## 2021-05-11 PROCEDURE — 73562 X-RAY EXAM OF KNEE 3: CPT | Mod: RT

## 2021-05-11 PROCEDURE — 99213 OFFICE O/P EST LOW 20 MIN: CPT

## 2021-05-18 NOTE — PHYSICAL THERAPY INITIAL EVALUATION ADULT - BED MOBILITY LIMITATIONS, REHAB EVAL
Patient returned call.   Please call back at earliest convenience.    Thank you!   decreased ability to use legs for bridging/pushing

## 2021-06-02 ENCOUNTER — APPOINTMENT (OUTPATIENT)
Dept: GASTROENTEROLOGY | Facility: CLINIC | Age: 76
End: 2021-06-02
Payer: MEDICARE

## 2021-06-02 VITALS
WEIGHT: 178 LBS | TEMPERATURE: 97.7 F | HEIGHT: 67 IN | BODY MASS INDEX: 27.94 KG/M2 | SYSTOLIC BLOOD PRESSURE: 122 MMHG | DIASTOLIC BLOOD PRESSURE: 70 MMHG

## 2021-06-02 PROCEDURE — 99214 OFFICE O/P EST MOD 30 MIN: CPT

## 2021-06-02 NOTE — HISTORY OF PRESENT ILLNESS
[FreeTextEntry1] : Patient is a 76-year-old male with a large hiatal hernia not desiring repair with occasional GERD.  He uses pantoprazole only occasionally.  He also has had a 2-month history of "lower quadrant discomfort.  He has had a previous colonoscopy over the last 2 years.  No weight loss anorexia fever or chills or rectal bleeding.

## 2021-06-02 NOTE — PHYSICAL EXAM
[General Appearance - Alert] : alert [General Appearance - In No Acute Distress] : in no acute distress [Sclera] : the sclera and conjunctiva were normal [PERRL With Normal Accommodation] : pupils were equal in size, round, and reactive to light [Extraocular Movements] : extraocular movements were intact [Outer Ear] : the ears and nose were normal in appearance [Oropharynx] : the oropharynx was normal [Neck Appearance] : the appearance of the neck was normal [Neck Cervical Mass (___cm)] : no neck mass was observed [Jugular Venous Distention Increased] : there was no jugular-venous distention [Thyroid Diffuse Enlargement] : the thyroid was not enlarged [Thyroid Nodule] : there were no palpable thyroid nodules [Auscultation Breath Sounds / Voice Sounds] : lungs were clear to auscultation bilaterally [Heart Rate And Rhythm] : heart rate was normal and rhythm regular [Heart Sounds] : normal S1 and S2 [Heart Sounds Gallop] : no gallops [Murmurs] : no murmurs [Heart Sounds Pericardial Friction Rub] : no pericardial rub [Full Pulse] : the pedal pulses are present [Edema] : there was no peripheral edema [Bowel Sounds] : normal bowel sounds [Abdomen Soft] : soft [Abdomen Tenderness] : non-tender [] : no hepato-splenomegaly [Abdomen Mass (___ Cm)] : no abdominal mass palpated [Cervical Lymph Nodes Enlarged Posterior Bilaterally] : posterior cervical [Cervical Lymph Nodes Enlarged Anterior Bilaterally] : anterior cervical [Supraclavicular Lymph Nodes Enlarged Bilaterally] : supraclavicular [Axillary Lymph Nodes Enlarged Bilaterally] : axillary [Femoral Lymph Nodes Enlarged Bilaterally] : femoral [Inguinal Lymph Nodes Enlarged Bilaterally] : inguinal [Abnormal Walk] : normal gait [Nail Clubbing] : no clubbing  or cyanosis of the fingernails [Musculoskeletal - Swelling] : no joint swelling seen [Motor Tone] : muscle strength and tone were normal [Deep Tendon Reflexes (DTR)] : deep tendon reflexes were 2+ and symmetric [Sensation] : the sensory exam was normal to light touch and pinprick [No Focal Deficits] : no focal deficits [Oriented To Time, Place, And Person] : oriented to person, place, and time [Impaired Insight] : insight and judgment were intact [Affect] : the affect was normal

## 2021-06-02 NOTE — REVIEW OF SYSTEMS
[place] [As Noted in HPI] : as noted in HPI [Abdominal Pain] : abdominal pain [Heartburn] : heartburn [Negative] : Heme/Lymph

## 2021-06-02 NOTE — ASSESSMENT
[FreeTextEntry1] : 76-year-old male with chronic GERD related to his hiatal hernia with only intermittent usage of pantoprazole.  I encouraged him to avoid late night eating and to use his pantoprazole daily to which she agreed.  With regards to his left lower quadrant discomfort, I do not notice any left inguinal hernia but he also has occasional left flank pain and the possibility of a renal stone needs to be excluded.  We will order a CAT scan of the abdomen pelvis with oral contrast only at [facility] and the patient will follow up with me for the results.

## 2021-06-04 ENCOUNTER — APPOINTMENT (OUTPATIENT)
Dept: CT IMAGING | Facility: IMAGING CENTER | Age: 76
End: 2021-06-04

## 2021-06-08 ENCOUNTER — APPOINTMENT (OUTPATIENT)
Dept: UROLOGY | Facility: CLINIC | Age: 76
End: 2021-06-08
Payer: MEDICARE

## 2021-06-08 VITALS
TEMPERATURE: 97.2 F | SYSTOLIC BLOOD PRESSURE: 124 MMHG | OXYGEN SATURATION: 95 % | DIASTOLIC BLOOD PRESSURE: 80 MMHG | HEART RATE: 69 BPM

## 2021-06-08 DIAGNOSIS — R35.0 FREQUENCY OF MICTURITION: ICD-10-CM

## 2021-06-08 DIAGNOSIS — N40.1 BENIGN PROSTATIC HYPERPLASIA WITH LOWER URINARY TRACT SYMPMS: ICD-10-CM

## 2021-06-08 DIAGNOSIS — N13.8 BENIGN PROSTATIC HYPERPLASIA WITH LOWER URINARY TRACT SYMPMS: ICD-10-CM

## 2021-06-08 PROCEDURE — 99214 OFFICE O/P EST MOD 30 MIN: CPT

## 2021-06-08 PROCEDURE — 88112 CYTOPATH CELL ENHANCE TECH: CPT | Mod: 26

## 2021-06-08 NOTE — PHYSICAL EXAM
[General Appearance - Well Developed] : well developed [General Appearance - Well Nourished] : well nourished [Normal Appearance] : normal appearance [Well Groomed] : well groomed [General Appearance - In No Acute Distress] : no acute distress [Abdomen Soft] : soft [Costovertebral Angle Tenderness] : no ~M costovertebral angle tenderness [Abdomen Tenderness] : non-tender [Urethral Meatus] : meatus normal [Urinary Bladder Findings] : the bladder was normal on palpation [Scrotum] : the scrotum was normal [Testes Mass (___cm)] : there were no testicular masses [No Prostate Nodules] : no prostate nodules [Edema] : no peripheral edema [] : no respiratory distress [Respiration, Rhythm And Depth] : normal respiratory rhythm and effort [Exaggerated Use Of Accessory Muscles For Inspiration] : no accessory muscle use [Oriented To Time, Place, And Person] : oriented to person, place, and time [Affect] : the affect was normal [Mood] : the mood was normal [Not Anxious] : not anxious [Normal Station and Gait] : the gait and station were normal for the patient's age [No Focal Deficits] : no focal deficits [No Palpable Adenopathy] : no palpable adenopathy

## 2021-06-09 LAB
APPEARANCE: CLEAR
BACTERIA: NEGATIVE
BILIRUBIN URINE: NEGATIVE
BLOOD URINE: NEGATIVE
COLOR: YELLOW
GLUCOSE QUALITATIVE U: NEGATIVE
HYALINE CASTS: 0 /LPF
KETONES URINE: NEGATIVE
LEUKOCYTE ESTERASE URINE: NEGATIVE
MICROSCOPIC-UA: NORMAL
NITRITE URINE: NEGATIVE
PH URINE: 6
PROTEIN URINE: NORMAL
RED BLOOD CELLS URINE: 2 /HPF
SPECIFIC GRAVITY URINE: 1.03
SQUAMOUS EPITHELIAL CELLS: 0 /HPF
URINE CYTOLOGY: NORMAL
UROBILINOGEN URINE: NORMAL
WHITE BLOOD CELLS URINE: 1 /HPF

## 2021-06-10 LAB — BACTERIA UR CULT: NORMAL

## 2021-06-11 ENCOUNTER — NON-APPOINTMENT (OUTPATIENT)
Age: 76
End: 2021-06-11

## 2021-06-20 ENCOUNTER — OUTPATIENT (OUTPATIENT)
Dept: OUTPATIENT SERVICES | Facility: HOSPITAL | Age: 76
LOS: 1 days | End: 2021-06-20
Payer: MEDICARE

## 2021-06-20 ENCOUNTER — APPOINTMENT (OUTPATIENT)
Dept: CT IMAGING | Facility: IMAGING CENTER | Age: 76
End: 2021-06-20
Payer: MEDICARE

## 2021-06-20 DIAGNOSIS — Z98.49 CATARACT EXTRACTION STATUS, UNSPECIFIED EYE: Chronic | ICD-10-CM

## 2021-06-20 DIAGNOSIS — N40.1 BENIGN PROSTATIC HYPERPLASIA WITH LOWER URINARY TRACT SYMPTOMS: ICD-10-CM

## 2021-06-20 DIAGNOSIS — Z98.890 OTHER SPECIFIED POSTPROCEDURAL STATES: Chronic | ICD-10-CM

## 2021-06-20 PROCEDURE — 74178 CT ABD&PLV WO CNTR FLWD CNTR: CPT | Mod: 26,MH

## 2021-06-20 PROCEDURE — 74178 CT ABD&PLV WO CNTR FLWD CNTR: CPT

## 2021-06-21 ENCOUNTER — TRANSCRIPTION ENCOUNTER (OUTPATIENT)
Age: 76
End: 2021-06-21

## 2021-06-25 ENCOUNTER — APPOINTMENT (OUTPATIENT)
Dept: CARDIOLOGY | Facility: CLINIC | Age: 76
End: 2021-06-25
Payer: MEDICARE

## 2021-06-25 ENCOUNTER — NON-APPOINTMENT (OUTPATIENT)
Age: 76
End: 2021-06-25

## 2021-06-25 VITALS
HEIGHT: 67 IN | RESPIRATION RATE: 20 BRPM | TEMPERATURE: 97.2 F | BODY MASS INDEX: 28.88 KG/M2 | SYSTOLIC BLOOD PRESSURE: 143 MMHG | WEIGHT: 184 LBS | DIASTOLIC BLOOD PRESSURE: 71 MMHG | OXYGEN SATURATION: 98 % | HEART RATE: 62 BPM

## 2021-06-25 PROCEDURE — 93000 ELECTROCARDIOGRAM COMPLETE: CPT

## 2021-06-25 PROCEDURE — 99203 OFFICE O/P NEW LOW 30 MIN: CPT

## 2021-07-27 ENCOUNTER — APPOINTMENT (OUTPATIENT)
Dept: ORTHOPEDIC SURGERY | Facility: CLINIC | Age: 76
End: 2021-07-27
Payer: MEDICARE

## 2021-07-27 VITALS
HEART RATE: 60 BPM | WEIGHT: 175 LBS | SYSTOLIC BLOOD PRESSURE: 119 MMHG | BODY MASS INDEX: 27.47 KG/M2 | DIASTOLIC BLOOD PRESSURE: 79 MMHG | HEIGHT: 67 IN

## 2021-07-27 DIAGNOSIS — M17.0 BILATERAL PRIMARY OSTEOARTHRITIS OF KNEE: ICD-10-CM

## 2021-07-27 PROCEDURE — 20610 DRAIN/INJ JOINT/BURSA W/O US: CPT | Mod: LT

## 2021-07-27 PROCEDURE — 99213 OFFICE O/P EST LOW 20 MIN: CPT | Mod: 25

## 2021-07-27 RX ORDER — HYALURONATE SOD, CROSS-LINKED 30 MG/3 ML
30 SYRINGE (ML) INTRAARTICULAR
Refills: 0 | Status: COMPLETED | OUTPATIENT
Start: 2021-07-27

## 2021-07-27 RX ORDER — LIDOCAINE HYDROCHLORIDE 10 MG/ML
1 INJECTION, SOLUTION INFILTRATION; PERINEURAL
Refills: 0 | Status: COMPLETED | OUTPATIENT
Start: 2021-07-27

## 2021-07-27 RX ADMIN — Medication 0 MG/3ML: at 00:00

## 2021-07-27 RX ADMIN — LIDOCAINE HYDROCHLORIDE 5 %: 10 INJECTION, SOLUTION INFILTRATION; PERINEURAL at 00:00

## 2021-09-07 ENCOUNTER — NON-APPOINTMENT (OUTPATIENT)
Age: 76
End: 2021-09-07

## 2021-09-07 ENCOUNTER — MED ADMIN CHARGE (OUTPATIENT)
Age: 76
End: 2021-09-07

## 2021-09-07 ENCOUNTER — APPOINTMENT (OUTPATIENT)
Dept: ORTHOPEDIC SURGERY | Facility: CLINIC | Age: 76
End: 2021-09-07
Payer: MEDICARE

## 2021-09-07 ENCOUNTER — RX RENEWAL (OUTPATIENT)
Age: 76
End: 2021-09-07

## 2021-09-07 VITALS — HEART RATE: 59 BPM | SYSTOLIC BLOOD PRESSURE: 143 MMHG | DIASTOLIC BLOOD PRESSURE: 86 MMHG

## 2021-09-07 PROCEDURE — 99211 OFF/OP EST MAY X REQ PHY/QHP: CPT | Mod: 25

## 2021-09-07 PROCEDURE — 20610 DRAIN/INJ JOINT/BURSA W/O US: CPT | Mod: LT

## 2021-09-07 RX ORDER — CELECOXIB 200 MG/1
200 CAPSULE ORAL DAILY
Qty: 90 | Refills: 0 | Status: ACTIVE | COMMUNITY
Start: 2021-09-07 | End: 1900-01-01

## 2021-09-07 RX ADMIN — LIDOCAINE HYDROCHLORIDE 3 %: 10 INJECTION, SOLUTION INFILTRATION; PERINEURAL at 00:00

## 2021-09-07 RX ADMIN — METHYLPREDNISOLONE ACETATE 2 MG/ML: 40 INJECTION, SUSPENSION INTRALESIONAL; INTRAMUSCULAR; INTRASYNOVIAL; SOFT TISSUE at 00:00

## 2021-09-14 ENCOUNTER — APPOINTMENT (OUTPATIENT)
Dept: ORTHOPEDIC SURGERY | Facility: CLINIC | Age: 76
End: 2021-09-14

## 2021-12-09 ENCOUNTER — NON-APPOINTMENT (OUTPATIENT)
Age: 76
End: 2021-12-09

## 2021-12-09 ENCOUNTER — APPOINTMENT (OUTPATIENT)
Dept: PULMONOLOGY | Facility: CLINIC | Age: 76
End: 2021-12-09
Payer: MEDICARE

## 2021-12-09 VITALS
TEMPERATURE: 96.6 F | BODY MASS INDEX: 27.31 KG/M2 | HEART RATE: 70 BPM | RESPIRATION RATE: 16 BRPM | OXYGEN SATURATION: 98 % | WEIGHT: 174 LBS | SYSTOLIC BLOOD PRESSURE: 122 MMHG | DIASTOLIC BLOOD PRESSURE: 64 MMHG | HEIGHT: 67 IN

## 2021-12-09 PROCEDURE — 71046 X-RAY EXAM CHEST 2 VIEWS: CPT

## 2021-12-09 PROCEDURE — 99214 OFFICE O/P EST MOD 30 MIN: CPT | Mod: 25

## 2021-12-09 PROCEDURE — 94010 BREATHING CAPACITY TEST: CPT

## 2021-12-09 PROCEDURE — 95012 NITRIC OXIDE EXP GAS DETER: CPT

## 2021-12-09 RX ORDER — BUDESONIDE AND FORMOTEROL FUMARATE DIHYDRATE 160; 4.5 UG/1; UG/1
160-4.5 AEROSOL RESPIRATORY (INHALATION) TWICE DAILY
Qty: 1 | Refills: 3 | Status: ACTIVE | COMMUNITY
Start: 2021-12-09 | End: 1900-01-01

## 2021-12-09 NOTE — ADDENDUM
[FreeTextEntry1] : Documented by Tarah Russo acting as a scribe for Dr. Eron Johnson on 12/09/2021 \par \par All medical record entries made by the Scribe were at my, Dr. Eron Johnson's, direction and personally dictated by me on 12/09/2021 . I have reviewed the chart and agree that the record accurately reflects my personal performance of the history, physical exam, assessment and plan. I have also personally directed, reviewed, and agree with the discharge instructions.

## 2021-12-09 NOTE — PROCEDURE
[FreeTextEntry1] : CXR reveals normal sized heart; there was no evidence of infiltrate or effusion -- A normal chest radiograph. \par \par PFT mild restrictive mild obstructive normal flows, with an FEV1 of   1.52L, which is  68% of predicted, with normal flow volume loop \par \par \par FENO was 18 ; normal value being less than 25\par Fractional exhaled nitric oxide (FENO) is regarded as a simple, noninvasive method for assessing eosinophilic airway inflammation. Produced by a variety of cells within the lung, nitric oxide (NO) concentrations are generally low in healthy individuals. However, high concentrations of NO appear to be involved in nonspecific host defense mechanisms and chronic inflammatory diseases such as asthma. The American Thoracic Society (ATS) therefore has recommended using FENO to aid in the diagnosis and monitoring of eosinophilic airway inflammation and asthma, and for identifying steroid responsive individuals whose chronic respiratory symptoms may be airway inflammation.

## 2021-12-09 NOTE — ASSESSMENT
[FreeTextEntry1] : Mr. LIVINGSTON is a 76 year old male with a history of nonsmoker, benign familial tremor, BPH, chronic sinus issues, GERD, OA, low vitamin D, lumbar disc disease who now comes to the office for a follow up pulmonary evaluation for asthma, allergy, and GERD.- noncompliant - #1 issue is post sinusitis - Active asthma \par \par His shortness of breath is multifactorial due to:\par -poor mechanics of breathing \par -out of shape / overweight\par -pulmonary disease\par      -Severe persistent asthma\par -cardiac disease \par \par problem 1: Severe Persistent Asthma (noncompliant)- Active \par -add Prednisone 20 mg x 7 days, 10 mg x 7 days \par Information sheet given to the patient to be reviewed, this medication is never to be used without consulting the prescribing physician. Proper dietary restraint is necessary specifically salt containing foods, if any reaction may occur should be reported. \par -add Singulair 10 mg QHS (Restart)\par -continue Symbicort (160) 2 inhalations BID (generic)\par -continue Ventolin 2 puffs Q6H, pre-exercise\par - Asthma is believed to be caused by inherited (genetic) and environmental factor, but its exact cause is unknown. Asthma may be triggered by allergens, lung infections, or irritants in the air. Asthma triggers are different for each person \par \par Problem 1A: Elevated IgE\par -Patient refused Xolair \par -Xolair is a recombinant DNA- derived humanized IgG1K monoclonal antibody that selectively binds to human immunoglobulin E (IgE). Xolair is produced by a Chinese hamster ovary cell suspension culture in nutrient medium containing the antibiotic gentamicin. Gentamicin is not detectable in the final product. Xolair is a sterile, white, preservative free, lyophilized powder contained in a single use vial that is reconstituted with sterile water for suspension. Side effects include: wheezing, tightness of the chest, trouble breathing, hives, skin rash, feeling anxious or light-headed, fainting, warmth or tingling under skin, or swelling of face, lips, or tongue \par \par Problem 2: allergies/sinus\par - continue Flonase 1 sniff/nostril BID\par - continue Singulair 10 mg QHS\par -continue Atrovent 0.6% at 1 sniff/nostril, up to TID \par - Recommended Navage\par - Continue Clarinex 5 mg before bed \par - s/p Blood work to include: asthma panel (+), food IgE panel (+), IgE level (+: 117), eosinophil level (wnl), vitamin D level (wnl)\par - Environmental measures for allergies were encouraged including mattress and pillow covers, air purifier, and environmental controls.\par \par Problem 3: GERD (Homeopathic treatment)\par - transition from Protonix 40 mg QAM, pre-breakfast to Pepcid 40 mg QHS\par - Recommended organic apple cider vinegar tablets \par -Kefir yogurt, hold on supplements. \par -Rule of 2s: avoid eating too much, eating too late, eating too spicy, eating two hours before bed.\par -Things to avoid including overeating, spicy foods, tight clothing, eating within three hours of bed, this list is not all inclusive. \par -For treatment of reflux, possible options discussed including diet control, H2 blockers, PPIs, as well as coating motility agents discussed as treatment options. Timing of meals and proximity of last meal to sleep were discussed. If symptoms persist, a formal gastrointestinal evaluation is needed. \par \par problem 4: cardiac disease\par -recommended to continue to follow up with Cardiologist (Aarti Shaikh)\par \par problem 5: poor breathing mechanics\par -Proper breathing techniques were reviewed with an emphasis of exhalation. Patient instructed to breath in for 1 second and out for four seconds. Patient was encouraged to not talk while walking. \par \par problem 6: out of shape / overweight\par -Weight loss, exercise, and diet control were discussed and are highly encouraged. Treatment options were given such as, aqua therapy, and contacting a nutritionist. Recommended to use the elliptical, stationary bike, less use of treadmill. Mindful eating was explained to the patient Obesity is associated with worsening asthma, shortness of breath, and potential for cardiac disease, diabetes, and other underlying medical conditions\par \par Problem 7a: COVID-19 precautionary Immune Support Recommendations: s/p Moderna vaccine x3 \par OTC Vitamin C 500mg BID \par -OTC Quercetin 250-500mg BID \par -OTC Zinc 75-100mg per day \par -OTC Melatonin 1 or 2mg a night \par -OTC Vitamin D 1-4000mg per day \par -OTC Tonic Water 8oz per day\par -Water 0.5-1 gallon per day\par \par problem 7: health maintenance \par -recommended yearly flu shot - denied \par -recommended strep pneumonia vaccines: Prevnar-13 vaccine (2019), followed by Pneumo vaccine 23 one year following (9/2020)\par -recommended early intervention for Upper Respiratory Infections (URIs)\par -recommended regular osteoporosis evaluations\par -recommended early dermatological evaluations\par -recommended after the age of 50 to the age of 70, colonoscopy every 5 years\par \par F/U in 6-8 weeks.\par He is encouraged to call with any changes, concerns, or questions.

## 2021-12-09 NOTE — HISTORY OF PRESENT ILLNESS
[FreeTextEntry1] : Mr. Rogers is a 76 year old male with a history of asthma, GERD, SOB, and vitamin D deficiency presenting today for a follow up pulmonary evaluation. His chief complaint is \par - he notes he was in [place] for about a month and a half, he returned mid November. \par - he notes he went to the [facility], so a lot of dust exposure. \par - he was given medicine for 3 days for his throat, but he felt no improvement so he went to the ENT. So he was told his sinuses were filled up and was told he may need surgery. He declines the surgery and was given abx. \par - when he came [place] the [place], he went to an ENT here, Dr. Farley, and was given 3 medications. \par - he notes he still coughs.\par - When he wakes up in the morning, he feels mucous in the chest. \par - he notes he is still having throat clearing \par - no SOB \par - no difficulty swallowing \par - no palpitations\par - he gets about 9 hours of sleep \par - he denies he snores \par - he notes his breathing is fine. \par - he feels his cough is coming from his upper chest \par - he notes his sinuses are better compared to before. \par - He  denies any visual issues, headaches, nausea, vomiting, fever, chills, sweats, chest pains, chest pressure, diarrhea, constipation, dysphagia, myalgia, dizziness, leg swelling, leg pain, itchy eyes, itchy ears, heartburn, reflux, or sour taste in the mouth.

## 2021-12-09 NOTE — PHYSICAL EXAM
[General Appearance - Well Developed] : well developed [Normal Appearance] : normal appearance [Well Groomed] : well groomed [General Appearance - Well Nourished] : well nourished [No Deformities] : no deformities [General Appearance - In No Acute Distress] : no acute distress [Normal Conjunctiva] : the conjunctiva exhibited no abnormalities [Eyelids - No Xanthelasma] : the eyelids demonstrated no xanthelasmas [Normal Oropharynx] : normal oropharynx [II] : II [Neck Appearance] : the appearance of the neck was normal [Neck Cervical Mass (___cm)] : no neck mass was observed [Jugular Venous Distention Increased] : there was no jugular-venous distention [Thyroid Diffuse Enlargement] : the thyroid was not enlarged [Thyroid Nodule] : there were no palpable thyroid nodules [Heart Rate And Rhythm] : heart rate and rhythm were normal [Heart Sounds] : normal S1 and S2 [Murmurs] : no murmurs present [Respiration, Rhythm And Depth] : normal respiratory rhythm and effort [Exaggerated Use Of Accessory Muscles For Inspiration] : no accessory muscle use [Auscultation Breath Sounds / Voice Sounds] : lungs were clear to auscultation bilaterally [Abdomen Soft] : soft [Abdomen Tenderness] : non-tender [Abdomen Mass (___ Cm)] : no abdominal mass palpated [Abnormal Walk] : normal gait [Gait - Sufficient For Exercise Testing] : the gait was sufficient for exercise testing [Nail Clubbing] : no clubbing of the fingernails [Cyanosis, Localized] : no localized cyanosis [Petechial Hemorrhages (___cm)] : no petechial hemorrhages [Skin Color & Pigmentation] : normal skin color and pigmentation [Skin Turgor] : normal skin turgor [] : no rash [Deep Tendon Reflexes (DTR)] : deep tendon reflexes were 2+ and symmetric [Sensation] : the sensory exam was normal to light touch and pinprick [No Focal Deficits] : no focal deficits [Oriented To Time, Place, And Person] : oriented to person, place, and time [Impaired Insight] : insight and judgment were intact [Affect] : the affect was normal [FreeTextEntry1] : I:E 1:3, clear

## 2021-12-13 ENCOUNTER — OUTPATIENT (OUTPATIENT)
Dept: OUTPATIENT SERVICES | Facility: HOSPITAL | Age: 76
LOS: 1 days | End: 2021-12-13
Payer: MEDICARE

## 2021-12-13 ENCOUNTER — APPOINTMENT (OUTPATIENT)
Dept: MRI IMAGING | Facility: CLINIC | Age: 76
End: 2021-12-13
Payer: MEDICARE

## 2021-12-13 DIAGNOSIS — Z98.49 CATARACT EXTRACTION STATUS, UNSPECIFIED EYE: Chronic | ICD-10-CM

## 2021-12-13 DIAGNOSIS — M51.26 OTHER INTERVERTEBRAL DISC DISPLACEMENT, LUMBAR REGION: ICD-10-CM

## 2021-12-13 DIAGNOSIS — Z98.890 OTHER SPECIFIED POSTPROCEDURAL STATES: Chronic | ICD-10-CM

## 2021-12-13 DIAGNOSIS — M54.16 RADICULOPATHY, LUMBAR REGION: ICD-10-CM

## 2021-12-13 PROCEDURE — G1004: CPT

## 2021-12-13 PROCEDURE — 72148 MRI LUMBAR SPINE W/O DYE: CPT | Mod: 26,ME

## 2021-12-13 PROCEDURE — 72148 MRI LUMBAR SPINE W/O DYE: CPT | Mod: ME

## 2022-01-18 NOTE — ASU PREOP CHECKLIST - DENTURES
PROCEDURE: Bone marrow Biopsy and Aspiration    INDICATIONS FOR PROCEDURE: lymphoma staging    DESCRIPTION OF PROCEDURE: Pt was given written information about the procedure.  They provided their written informed consent.  I then went on to premedicate the patient with lorazepam and oxycodone.  The patient got into the prone position and I sterilely prepped and draped their right posterior iliac crest.  I used 1% local lidocaine for anesthesia.  I then used an 11-gauge Jamshidi needle.  I was able to get about 10 mL of particulate aspirate.  I then went on to get a core of trabecular bone that was about 8 mm in size.  Patient tolerated the procedure with minimal pain and bleeding.  The specimens were sent for routine histology, flow cytometry, and cytogenetics.  I placed a sterile pressure dressing with instructions for it to remain clean dry and intact for 24 hours.  Patient will return to the clinic for follow-up of these results.    
no

## 2022-02-01 ENCOUNTER — NON-APPOINTMENT (OUTPATIENT)
Age: 77
End: 2022-02-01

## 2022-02-01 ENCOUNTER — APPOINTMENT (OUTPATIENT)
Dept: CARDIOLOGY | Facility: CLINIC | Age: 77
End: 2022-02-01
Payer: MEDICARE

## 2022-02-01 VITALS
WEIGHT: 175 LBS | DIASTOLIC BLOOD PRESSURE: 70 MMHG | HEIGHT: 67 IN | SYSTOLIC BLOOD PRESSURE: 130 MMHG | HEART RATE: 80 BPM | TEMPERATURE: 97.5 F | OXYGEN SATURATION: 98 % | BODY MASS INDEX: 27.47 KG/M2 | RESPIRATION RATE: 16 BRPM

## 2022-02-01 VITALS — SYSTOLIC BLOOD PRESSURE: 110 MMHG | DIASTOLIC BLOOD PRESSURE: 70 MMHG

## 2022-02-01 DIAGNOSIS — I95.1 ORTHOSTATIC HYPOTENSION: ICD-10-CM

## 2022-02-01 PROCEDURE — 99214 OFFICE O/P EST MOD 30 MIN: CPT

## 2022-02-01 PROCEDURE — 93000 ELECTROCARDIOGRAM COMPLETE: CPT

## 2022-02-09 ENCOUNTER — APPOINTMENT (OUTPATIENT)
Dept: CARDIOLOGY | Facility: CLINIC | Age: 77
End: 2022-02-09
Payer: MEDICARE

## 2022-02-09 PROCEDURE — 93015 CV STRESS TEST SUPVJ I&R: CPT

## 2022-02-09 PROCEDURE — 93306 TTE W/DOPPLER COMPLETE: CPT

## 2022-04-01 ENCOUNTER — APPOINTMENT (OUTPATIENT)
Dept: ORTHOPEDIC SURGERY | Facility: HOSPITAL | Age: 77
End: 2022-04-01

## 2022-04-13 ENCOUNTER — APPOINTMENT (OUTPATIENT)
Dept: PULMONOLOGY | Facility: CLINIC | Age: 77
End: 2022-04-13

## 2022-04-22 ENCOUNTER — APPOINTMENT (OUTPATIENT)
Dept: PULMONOLOGY | Facility: CLINIC | Age: 77
End: 2022-04-22
Payer: MEDICARE

## 2022-04-22 ENCOUNTER — NON-APPOINTMENT (OUTPATIENT)
Age: 77
End: 2022-04-22

## 2022-04-22 VITALS
OXYGEN SATURATION: 98 % | DIASTOLIC BLOOD PRESSURE: 60 MMHG | BODY MASS INDEX: 27.62 KG/M2 | SYSTOLIC BLOOD PRESSURE: 110 MMHG | HEIGHT: 67 IN | RESPIRATION RATE: 16 BRPM | HEART RATE: 61 BPM | WEIGHT: 176 LBS | TEMPERATURE: 97.2 F

## 2022-04-22 DIAGNOSIS — J31.0 CHRONIC RHINITIS: ICD-10-CM

## 2022-04-22 PROCEDURE — 94010 BREATHING CAPACITY TEST: CPT

## 2022-04-22 PROCEDURE — 99214 OFFICE O/P EST MOD 30 MIN: CPT | Mod: 25

## 2022-04-22 PROCEDURE — 95012 NITRIC OXIDE EXP GAS DETER: CPT

## 2022-04-22 RX ORDER — AZELASTINE HYDROCHLORIDE 205.5 UG/1
0.15 SPRAY, METERED NASAL TWICE DAILY
Qty: 1 | Refills: 5 | Status: ACTIVE | COMMUNITY
Start: 2022-04-22 | End: 1900-01-01

## 2022-04-22 NOTE — ASSESSMENT
[FreeTextEntry1] : Mr. LIVINGSTON is a 77 year old male with a history of nonsmoker, benign familial tremor, BPH, chronic sinus issues, GERD, OA, low vitamin D, lumbar disc disease who now comes to the office for a follow up pulmonary evaluation for asthma, allergy, and GERD.- noncompliant - #1 issue is sinus congestion (AM)/ reflux \par \par His shortness of breath is multifactorial due to:\par -poor mechanics of breathing \par -out of shape / overweight\par -pulmonary disease\par      -Severe persistent asthma\par -cardiac disease \par \par problem 1: Severe Persistent Asthma (noncompliant)- semi controlled \par -s/p Prednisone 20 mg x 7 days, 10 mg x 7 days 12/2021\par Information sheet given to the patient to be reviewed, this medication is never to be used without consulting the prescribing physician. Proper dietary restraint is necessary specifically salt containing foods, if any reaction may occur should be reported. \par -add Singulair 10 mg QHS (NC)\par -continue Symbicort (160) 2 inhalations BID (generic) (NC)\par -continue Ventolin 2 puffs Q6H, pre-exercise\par - Asthma is believed to be caused by inherited (genetic) and environmental factor, but its exact cause is unknown. Asthma may be triggered by allergens, lung infections, or irritants in the air. Asthma triggers are different for each person \par \par Problem 1A: Elevated IgE\par -Patient refused Xolair \par -Xolair is a recombinant DNA- derived humanized IgG1K monoclonal antibody that selectively binds to human immunoglobulin E (IgE). Xolair is produced by a Chinese hamster ovary cell suspension culture in nutrient medium containing the antibiotic gentamicin. Gentamicin is not detectable in the final product. Xolair is a sterile, white, preservative free, lyophilized powder contained in a single use vial that is reconstituted with sterile water for suspension. Side effects include: wheezing, tightness of the chest, trouble breathing, hives, skin rash, feeling anxious or light-headed, fainting, warmth or tingling under skin, or swelling of face, lips, or tongue \par \par Problem 2: allergies/sinus- active\par - continue Flonase 1 sniff/nostril BID/ - Astelin 0.15% 1 sniff/nostril BID \par - continue Singulair 10 mg QHS\par -continue Atrovent 0.6% at 1 sniff/nostril, up to TID \par - Recommended Navage\par - Continue Clarinex 5 mg before bed \par - s/p Blood work to include: asthma panel (+), food IgE panel (+), IgE level (+: 117), eosinophil level (wnl), vitamin D level (wnl)\par - Environmental measures for allergies were encouraged including mattress and pillow covers, air purifier, and environmental controls.\par \par Problem 3: GERD (Homeopathic treatment)\par - transition from Protonix 40 mg QAM, pre-breakfast to Pepcid 40 mg QHS\par - Recommended organic apple cider vinegar tablets \par -Kefir yogurt, hold on supplements. \par -Rule of 2s: avoid eating too much, eating too late, eating too spicy, eating two hours before bed.\par -Things to avoid including overeating, spicy foods, tight clothing, eating within three hours of bed, this list is not all inclusive. \par -For treatment of reflux, possible options discussed including diet control, H2 blockers, PPIs, as well as coating motility agents discussed as treatment options. Timing of meals and proximity of last meal to sleep were discussed. If symptoms persist, a formal gastrointestinal evaluation is needed. \par \par problem 4: cardiac disease\par -recommended to continue to follow up with Cardiologist (Aarti Shaikh)\par \par problem 5: poor breathing mechanics\par - Recommended Wim Hof and Buteyko techniques \par -Proper breathing techniques were reviewed with an emphasis of exhalation. Patient instructed to breath in for 1 second and out for four seconds. Patient was encouraged to not talk while walking. \par \par problem 6: out of shape / overweight\par -Weight loss, exercise, and diet control were discussed and are highly encouraged. Treatment options were given such as, aqua therapy, and contacting a nutritionist. Recommended to use the elliptical, stationary bike, less use of treadmill. Mindful eating was explained to the patient Obesity is associated with worsening asthma, shortness of breath, and potential for cardiac disease, diabetes, and other underlying medical conditions\par \par Problem 7a: COVID-19 precautionary Immune Support Recommendations: s/p Moderna vaccine x3 \par OTC Vitamin C 500mg BID \par -OTC Quercetin 250-500mg BID \par -OTC Zinc 75-100mg per day \par -OTC Melatonin 1 or 2mg a night \par -OTC Vitamin D 1-4000mg per day \par -OTC Tonic Water 8oz per day\par -Water 0.5-1 gallon per day\par \par problem 7: health maintenance \par - s/p COVID-19 vax x3 \par - Discussed and recommended to wait for the updated booster \par -recommended yearly flu shot - denied \par -recommended strep pneumonia vaccines: Prevnar-13 vaccine (2019), followed by Pneumo vaccine 23 one year following (9/2020)\par -recommended early intervention for Upper Respiratory Infections (URIs)\par -recommended regular osteoporosis evaluations\par -recommended early dermatological evaluations\par -recommended after the age of 50 to the age of 70, colonoscopy every 5 years\par \par F/U in 6-8 weeks.\par He is encouraged to call with any changes, concerns, or questions.

## 2022-04-22 NOTE — ADDENDUM
[FreeTextEntry1] : Documented by Alexsander Ely acting as a scribe for Dr. Eron Johnson on (04/22/2022).\par \par All medical record record entries made by the Scribe were at my, Dr. Eron Johnson's, direction and personally dictated by me on (04/22/2022). I have reviewed the chart and agree that the record accurately reflects my personal performance of the history, physical exam, assessment and plan. I have personally directed, reviewed, and agree with the discharge instructions.

## 2022-04-22 NOTE — PROCEDURE
[FreeTextEntry1] : FENO was 11; a normal value being less than 25\par Fractional exhaled nitric oxide (FENO) is regarded as a simple, noninvasive method for assessing eosinophilic airway inflammation. Produced by a variety of cells within the lung, nitric oxide (NO) concentrations are generally low in healthy individuals. However, high concentrations of NO appear to be involved in nonspecific host defense mechanisms and chronic inflammatory diseases such as asthma. The American Thoracic Society (ATS) therefore has recommended using FENO to aid in the diagnosis and monitoring of eosinophilic airway inflammation and asthma, and for identifying steroid responsive individuals whose chronic respiratory symptoms may be caused by airway inflammation. \par \par PFT reveals normal flows, with an FEV1 of  1.70L, which is 78% of predicted, with a normal flow volume loop

## 2022-04-22 NOTE — PHYSICAL EXAM
[Normal Oropharynx] : normal oropharynx [No Acute Distress] : no acute distress [Normal Appearance] : normal appearance [No Neck Mass] : no neck mass [Normal Rate/Rhythm] : normal rate/rhythm [Normal S1, S2] : normal s1, s2 [No Murmurs] : no murmurs [No Resp Distress] : no resp distress [Clear to Auscultation Bilaterally] : clear to auscultation bilaterally [No Abnormalities] : no abnormalities [Benign] : benign [Normal Gait] : normal gait [No Clubbing] : no clubbing [No Edema] : no edema [No Cyanosis] : no cyanosis [FROM] : FROM [Normal Color/ Pigmentation] : normal color/ pigmentation [No Focal Deficits] : no focal deficits [Oriented x3] : oriented x3 [Normal Affect] : normal affect [III] : Mallampati Class: III [TextBox_[id number]] : I:E 1:3, clear

## 2022-04-22 NOTE — HISTORY OF PRESENT ILLNESS
[FreeTextEntry1] : Mr. Rogers is a 77 year old male with a history of asthma, GERD, SOB, and vitamin D deficiency presenting today for a follow up pulmonary evaluation. His chief complaint is \par - he notes sinuses are active\par - he notes nasal congestion \par - he notes it is mostly year around\par - he notes walking outside \par - he notes energy level is good\par - he denies any swallowing issues\par - he notes bowels are irregular intermittently \par - he notes sense of smell and taste is good\par - he notes weight is stable\par - he notes exercising \par - he notes working out more when in [place] \par - he denies any visual issues\par - he notes intermittent wheezing, only after waking up \par - he notes heartburn and reflux sometimes\par - he notes stomach issues are the same, depending on what he eats \par \par \par - He denies any headaches, nausea, vomiting, fever, chills, sweats, chest pain, chest pressure, palpitations, SOB, coughing, fatigue, diarrhea, constipation, dysphagia, myalgias, dizziness, leg swelling, leg pain, itchy eyes, itchy ears, or sour taste in the mouth

## 2022-04-25 ENCOUNTER — APPOINTMENT (OUTPATIENT)
Dept: ORTHOPEDIC SURGERY | Facility: CLINIC | Age: 77
End: 2022-04-25
Payer: MEDICARE

## 2022-04-25 VITALS — HEART RATE: 68 BPM | SYSTOLIC BLOOD PRESSURE: 124 MMHG | DIASTOLIC BLOOD PRESSURE: 82 MMHG

## 2022-04-25 DIAGNOSIS — M25.551 PAIN IN RIGHT HIP: ICD-10-CM

## 2022-04-25 PROCEDURE — 73502 X-RAY EXAM HIP UNI 2-3 VIEWS: CPT | Mod: RT

## 2022-04-25 PROCEDURE — 99213 OFFICE O/P EST LOW 20 MIN: CPT

## 2022-04-25 PROCEDURE — 73562 X-RAY EXAM OF KNEE 3: CPT | Mod: RT

## 2022-04-26 RX ORDER — LIDOCAINE HYDROCHLORIDE 10 MG/ML
1 INJECTION, SOLUTION INFILTRATION; PERINEURAL
Qty: 0 | Refills: 0 | Status: COMPLETED | OUTPATIENT
Start: 2021-09-07

## 2022-04-26 RX ORDER — METHYLPRED ACET/NACL,ISO-OS/PF 40 MG/ML
40 VIAL (ML) INJECTION
Qty: 1 | Refills: 0 | Status: COMPLETED | OUTPATIENT
Start: 2021-09-07

## 2022-07-19 ENCOUNTER — APPOINTMENT (OUTPATIENT)
Dept: ORTHOPEDIC SURGERY | Facility: CLINIC | Age: 77
End: 2022-07-19

## 2022-07-19 DIAGNOSIS — Z96.651 PRESENCE OF RIGHT ARTIFICIAL KNEE JOINT: ICD-10-CM

## 2022-07-19 PROCEDURE — 20610 DRAIN/INJ JOINT/BURSA W/O US: CPT | Mod: LT

## 2022-07-19 PROCEDURE — 99213 OFFICE O/P EST LOW 20 MIN: CPT | Mod: 25

## 2022-07-19 PROCEDURE — 73562 X-RAY EXAM OF KNEE 3: CPT | Mod: LT

## 2022-07-19 RX ORDER — HYALURONATE SOD, CROSS-LINKED 30 MG/3 ML
30 SYRINGE (ML) INTRAARTICULAR
Qty: 0 | Refills: 0 | Status: COMPLETED | OUTPATIENT
Start: 2022-07-19

## 2022-07-19 RX ORDER — LIDOCAINE HYDROCHLORIDE 5 MG/ML
0.5 INJECTION, SOLUTION INFILTRATION; PERINEURAL
Qty: 0 | Refills: 0 | Status: COMPLETED | OUTPATIENT
Start: 2022-07-19

## 2022-07-19 RX ADMIN — LIDOCAINE HYDROCHLORIDE 3 %: 5 INJECTION, SOLUTION INFILTRATION; INTRAVENOUS at 00:00

## 2022-07-19 RX ADMIN — Medication 0 MG/3ML: at 00:00

## 2022-07-25 ENCOUNTER — APPOINTMENT (OUTPATIENT)
Dept: PULMONOLOGY | Facility: CLINIC | Age: 77
End: 2022-07-25

## 2022-07-27 ENCOUNTER — APPOINTMENT (OUTPATIENT)
Dept: PULMONOLOGY | Facility: CLINIC | Age: 77
End: 2022-07-27

## 2022-07-27 ENCOUNTER — NON-APPOINTMENT (OUTPATIENT)
Age: 77
End: 2022-07-27

## 2022-07-27 ENCOUNTER — APPOINTMENT (OUTPATIENT)
Dept: GASTROENTEROLOGY | Facility: CLINIC | Age: 77
End: 2022-07-27

## 2022-07-27 VITALS — RESPIRATION RATE: 12 BRPM | HEART RATE: 70 BPM

## 2022-07-27 VITALS
TEMPERATURE: 97.6 F | WEIGHT: 173 LBS | HEART RATE: 63 BPM | SYSTOLIC BLOOD PRESSURE: 120 MMHG | BODY MASS INDEX: 27.8 KG/M2 | OXYGEN SATURATION: 97 % | RESPIRATION RATE: 16 BRPM | DIASTOLIC BLOOD PRESSURE: 68 MMHG | HEIGHT: 66 IN

## 2022-07-27 VITALS
BODY MASS INDEX: 26.53 KG/M2 | DIASTOLIC BLOOD PRESSURE: 70 MMHG | SYSTOLIC BLOOD PRESSURE: 120 MMHG | HEIGHT: 67 IN | WEIGHT: 169 LBS

## 2022-07-27 DIAGNOSIS — R19.7 DIARRHEA, UNSPECIFIED: ICD-10-CM

## 2022-07-27 DIAGNOSIS — G25.0 ESSENTIAL TREMOR: ICD-10-CM

## 2022-07-27 DIAGNOSIS — Z71.89 OTHER SPECIFIED COUNSELING: ICD-10-CM

## 2022-07-27 PROCEDURE — 95012 NITRIC OXIDE EXP GAS DETER: CPT

## 2022-07-27 PROCEDURE — 94010 BREATHING CAPACITY TEST: CPT

## 2022-07-27 PROCEDURE — 99214 OFFICE O/P EST MOD 30 MIN: CPT

## 2022-07-27 PROCEDURE — 99214 OFFICE O/P EST MOD 30 MIN: CPT | Mod: 25

## 2022-07-27 RX ORDER — MONTELUKAST 10 MG/1
10 TABLET, FILM COATED ORAL
Qty: 1 | Refills: 1 | Status: DISCONTINUED | COMMUNITY
Start: 2021-12-09 | End: 2022-07-27

## 2022-07-27 RX ORDER — ALBUTEROL SULFATE 90 UG/1
108 (90 BASE) AEROSOL, METERED RESPIRATORY (INHALATION) EVERY 6 HOURS
Qty: 1 | Refills: 5 | Status: DISCONTINUED | COMMUNITY
Start: 2019-12-06 | End: 2022-07-27

## 2022-07-27 RX ORDER — FAMOTIDINE 40 MG/1
40 TABLET, FILM COATED ORAL
Qty: 90 | Refills: 1 | Status: ACTIVE | COMMUNITY
Start: 2019-12-06 | End: 1900-01-01

## 2022-07-27 RX ORDER — BUDESONIDE AND FORMOTEROL FUMARATE DIHYDRATE 160; 4.5 UG/1; UG/1
160-4.5 AEROSOL RESPIRATORY (INHALATION) TWICE DAILY
Qty: 3 | Refills: 1 | Status: DISCONTINUED | COMMUNITY
Start: 2020-09-04 | End: 2022-07-27

## 2022-07-27 RX ORDER — PREDNISONE 10 MG/1
10 TABLET ORAL
Qty: 50 | Refills: 0 | Status: DISCONTINUED | COMMUNITY
Start: 2021-12-09 | End: 2022-07-27

## 2022-07-27 RX ORDER — ASPIRIN 81 MG/1
81 TABLET, DELAYED RELEASE ORAL
Refills: 0 | Status: DISCONTINUED | COMMUNITY
End: 2022-07-27

## 2022-07-27 RX ORDER — MONTELUKAST 10 MG/1
10 TABLET, FILM COATED ORAL
Qty: 90 | Refills: 0 | Status: DISCONTINUED | COMMUNITY
Start: 2019-12-06 | End: 2022-07-27

## 2022-07-27 NOTE — PHYSICAL EXAM
[General Appearance - Alert] : alert [General Appearance - In No Acute Distress] : in no acute distress [Sclera] : the sclera and conjunctiva were normal [PERRL With Normal Accommodation] : pupils were equal in size, round, and reactive to light [Extraocular Movements] : extraocular movements were intact [Outer Ear] : the ears and nose were normal in appearance [Oropharynx] : the oropharynx was normal [Neck Appearance] : the appearance of the neck was normal [Neck Cervical Mass (___cm)] : no neck mass was observed [Jugular Venous Distention Increased] : there was no jugular-venous distention [Thyroid Diffuse Enlargement] : the thyroid was not enlarged [Thyroid Nodule] : there were no palpable thyroid nodules [Auscultation Breath Sounds / Voice Sounds] : lungs were clear to auscultation bilaterally [Heart Rate And Rhythm] : heart rate was normal and rhythm regular [Heart Sounds] : normal S1 and S2 [Heart Sounds Gallop] : no gallops [Murmurs] : no murmurs [Heart Sounds Pericardial Friction Rub] : no pericardial rub [Full Pulse] : the pedal pulses are present [Edema] : there was no peripheral edema [Abdomen Soft] : soft [Bowel Sounds] : normal bowel sounds [Abdomen Tenderness] : non-tender [] : no hepato-splenomegaly [Abdomen Mass (___ Cm)] : no abdominal mass palpated [Cervical Lymph Nodes Enlarged Posterior Bilaterally] : posterior cervical [Cervical Lymph Nodes Enlarged Anterior Bilaterally] : anterior cervical [Axillary Lymph Nodes Enlarged Bilaterally] : axillary [Supraclavicular Lymph Nodes Enlarged Bilaterally] : supraclavicular [Femoral Lymph Nodes Enlarged Bilaterally] : femoral [Inguinal Lymph Nodes Enlarged Bilaterally] : inguinal [Abnormal Walk] : normal gait [Nail Clubbing] : no clubbing  or cyanosis of the fingernails [Musculoskeletal - Swelling] : no joint swelling seen [Motor Tone] : muscle strength and tone were normal [Deep Tendon Reflexes (DTR)] : deep tendon reflexes were 2+ and symmetric [Sensation] : the sensory exam was normal to light touch and pinprick [No Focal Deficits] : no focal deficits [Oriented To Time, Place, And Person] : oriented to person, place, and time [Impaired Insight] : insight and judgment were intact [Affect] : the affect was normal

## 2022-07-27 NOTE — PROCEDURE
[FreeTextEntry1] : Feno was 23; a normal value being less than 25. Fractional exhaled nitric oxide (FENO) is regarded as a simple, noninvasive method for assessing eosinophilic airway inflammation. Produced by a variety of cells within the lung, nitric oxide (NO) concentrations are generally low in healthy individuals. However, high concentrations of NO appear to be involved in nonspecific host defense mechanisms and chronic inflammatory  diseases such as asthma. The American Thoracic Society (ATS) therefore recommended using FENO to aid in the diagnosis and monitoring of eosinophilic airway inflammation and asthma, and for identifying steroid responsive individuals whose chronic respiratory symptoms may be caused by airway inflammation \par \par PFT revealed mild restrictive and mild obstructive dysfunction, with a FEV1 of 1.40L, which is 67% of predicted, with a normal flow volume loop\par

## 2022-07-27 NOTE — ASSESSMENT
[FreeTextEntry1] : Patient is a 77-year-old male with a large hiatal hernia not desiring repair with occasional GERD.  He uses pantoprazole only occasionally.  He also has had a 2-month history of "lower quadrant discomfort.  He has had a previous colonoscopy over the last 2 years.  No weight loss anorexia fever or chills or rectal bleeding.\par \par Returned recently from [place] and has regular bowel movements with a foul smell.  He is using a colonic purgative that he purchased in [place].  There is no fever or chills.  He is no longer using any pantoprazole.  He saw his pulmonologist Eron Johnson yesterday\par \par likely bacterial gastroenteritis\par \par Plan:\par 1. stop purgative\par 2. zpack x 5 days\par 3. Advised to call in followup next week. If no improvement, will obtain stool testing.

## 2022-07-27 NOTE — ASSESSMENT
[FreeTextEntry1] : Mr. LIVINGSTON is a 77 year old male with a history of nonsmoker, benign familial tremor, BPH, chronic sinus issues, GERD, OA, low vitamin D, lumbar disc disease who now comes to the office for a follow up pulmonary evaluation for asthma, allergy, and GERD.- noncompliant - #1 issue is sinus congestion\par \par His shortness of breath is multifactorial due to:\par -poor mechanics of breathing \par -out of shape / overweight\par -pulmonary disease\par      -Severe persistent asthma\par -cardiac disease \par \par problem 1: Severe Persistent Asthma (noncompliant)- semi controlled \par -s/p Prednisone 20 mg x 7 days, 10 mg x 7 days 12/2021\par Information sheet given to the patient to be reviewed, this medication is never to be used without consulting the prescribing physician. Proper dietary restraint is necessary specifically salt containing foods, if any reaction may occur should be reported. \par -add Singulair 10 mg QHS (NC)\par -continue Symbicort (160) 2 inhalations BID (generic) (NC) = (from [place])\par -continue Ventolin 2 puffs Q6H, pre-exercise\par - Asthma is believed to be caused by inherited (genetic) and environmental factor, but its exact cause is unknown. Asthma may be triggered by allergens, lung infections, or irritants in the air. Asthma triggers are different for each person \par \par Problem 1A: Elevated IgE\par -Patient refused Xolair \par -Xolair is a recombinant DNA- derived humanized IgG1K monoclonal antibody that selectively binds to human immunoglobulin E (IgE). Xolair is produced by a Chinese hamster ovary cell suspension culture in nutrient medium containing the antibiotic gentamicin. Gentamicin is not detectable in the final product. Xolair is a sterile, white, preservative free, lyophilized powder contained in a single use vial that is reconstituted with sterile water for suspension. Side effects include: wheezing, tightness of the chest, trouble breathing, hives, skin rash, feeling anxious or light-headed, fainting, warmth or tingling under skin, or swelling of face, lips, or tongue \par \par Problem 2: allergies/sinus- active\par - continue Flonase 1 sniff/nostril BID/ - Astelin 0.15% 1 sniff/nostril BID  = Dymista \par - continue Singulair 10 mg QHS\par -continue Atrovent 0.6% at 1 sniff/nostril, up to TID \par - Recommended Navage\par - Continue Clarinex 5 mg before bed \par - s/p Blood work to include: asthma panel (+), food IgE panel (+), IgE level (+: 117), eosinophil level (wnl), vitamin D level (wnl)\par - Environmental measures for allergies were encouraged including mattress and pillow covers, air purifier, and environmental controls.\par \par Problem 3: GERD (Homeopathic treatment)\par - transition from Protonix 40 mg QAM, pre-breakfast to Pepcid 40 mg QHS\par - Recommended organic apple cider vinegar tablets \par -Kefir yogurt, hold on supplements. \par -Rule of 2s: avoid eating too much, eating too late, eating too spicy, eating two hours before bed.\par -Things to avoid including overeating, spicy foods, tight clothing, eating within three hours of bed, this list is not all inclusive. \par -For treatment of reflux, possible options discussed including diet control, H2 blockers, PPIs, as well as coating motility agents discussed as treatment options. Timing of meals and proximity of last meal to sleep were discussed. If symptoms persist, a formal gastrointestinal evaluation is needed. \par \par problem 4: cardiac disease\par -recommended to continue to follow up with Cardiologist (Aarti Shaikh)\par \par problem 5: poor breathing mechanics\par - Recommended Wim Hof and Buteyko techniques \par -Proper breathing techniques were reviewed with an emphasis of exhalation. Patient instructed to breath in for 1 second and out for four seconds. Patient was encouraged to not talk while walking. \par \par problem 6: out of shape / overweight\par -Recommended Alberto Garrett's 10-day detox diet and book. \par -Weight loss, exercise, and diet control were discussed and are highly encouraged. Treatment options were given such as, aqua therapy, and contacting a nutritionist. Recommended to use the elliptical, stationary bike, less use of treadmill. Mindful eating was explained to the patient Obesity is associated with worsening asthma, shortness of breath, and potential for cardiac disease, diabetes, and other underlying medical conditions\par \par Problem 7a: COVID-19 precautionary Immune Support Recommendations: s/p Moderna vaccine x3 \par OTC Vitamin C 500mg BID \par -OTC Quercetin 250-500mg BID \par -OTC Zinc 75-100mg per day \par -OTC Melatonin 1 or 2mg a night \par -OTC Vitamin D 1-4000mg per day \par -OTC Tonic Water 8oz per day\par -Water 0.5-1 gallon per day\par \par problem 7: health maintenance \par - s/p COVID-19 vax x3 \par -Recommended not to get an additional COVID-19 booster at this time until it is updated against the current variants. If planning on travelling, obtaining another booster within 2 weeks of departure is recommended. \par - Discussed and recommended to wait for the updated booster \par -recommended yearly flu shot - denied \par -recommended strep pneumonia vaccines: Prevnar-13 vaccine (2019), followed by Pneumo vaccine 23 one year following (9/2020)\par -recommended early intervention for Upper Respiratory Infections (URIs)\par -recommended regular osteoporosis evaluations\par -recommended early dermatological evaluations\par -recommended after the age of 50 to the age of 70, colonoscopy every 5 years\par \par F/U in 6-8 weeks.\par He is encouraged to call with any changes, concerns, or questions.

## 2022-07-27 NOTE — PHYSICAL EXAM
[No Acute Distress] : no acute distress [Normal Oropharynx] : normal oropharynx [Normal Appearance] : normal appearance [No Neck Mass] : no neck mass [Normal Rate/Rhythm] : normal rate/rhythm [Normal S1, S2] : normal s1, s2 [No Murmurs] : no murmurs [No Resp Distress] : no resp distress [Clear to Auscultation Bilaterally] : clear to auscultation bilaterally [No Abnormalities] : no abnormalities [Benign] : benign [Normal Gait] : normal gait [No Clubbing] : no clubbing [No Cyanosis] : no cyanosis [No Edema] : no edema [FROM] : FROM [Normal Color/ Pigmentation] : normal color/ pigmentation [No Focal Deficits] : no focal deficits [Oriented x3] : oriented x3 [Normal Affect] : normal affect [II] : Mallampati Class: II [TextBox_68] : I:E 1:3, mild expiratory wheeze

## 2022-07-27 NOTE — HISTORY OF PRESENT ILLNESS
[FreeTextEntry1] : Mr. Rogers is a 77 year old male with a history of asthma, GERD, SOB, and vitamin D deficiency presenting today for a follow up pulmonary evaluation. His chief complaint is \par \par -he notes that he had trouble with his breahting in [place]\par -he notes he is taking Dymista, Theophyllin, Maxiflo\par -he notes diarrhea at times\par -he notes some wheezing\par -he notes that he has been feeling better since leaving [place] 3 weeks ago\par -he notes his bowels are regular \par -he notes that his sinuses are problematic\par -he notes his weight is stable \par -he notes his sense of smell and taste are normal \par -he notes that he is sleeping well \par -he notes exercising \par -he notes that his energy levels are 8-9/10\par -he denies snoring \par -he notes getting 8 hours of sleep \par -s/p covid 19 vaccine x3\par \par \par -patient denies any headaches, nausea, vomiting, fever, chills, sweats, chest pain, chest pressure, palpitations, coughing, fatigue, constipation, dysphagia, myalgias, dizziness, leg swelling, leg pain, itchy eyes, itchy ears, heartburn, reflux or sour taste in the mouth

## 2022-07-27 NOTE — ADDENDUM
[FreeTextEntry1] : Documented by Sherif Estrella acting as a scribe for Dr. Eron Johnson on 07/27/2022.\par \par All medical record entries made by the Scribe were at my, Dr. Eron Johnson's, direction and personally dictated by me on 07/27/2022. I have reviewed the chart and agree that the record accurately reflects my personal performance of the history, physical exam, assessment and plan. I have also personally directed, reviewed, and agree with the discharge instructions.

## 2022-07-27 NOTE — HISTORY OF PRESENT ILLNESS
[Heartburn] : denies heartburn [Nausea] : denies nausea [Vomiting] : denies vomiting [Diarrhea] : denies diarrhea [Constipation] : denies constipation [Yellow Skin Or Eyes (Jaundice)] : denies jaundice [Abdominal Swelling] : denies abdominal swelling [Rectal Pain] : denies rectal pain [Abdominal Pain] : abdominal pain [GERD] : gastroesophageal reflux disease [Irritable Bowel Syndrome] : irritable bowel syndrome [Wt Gain ___ Lbs] : no recent weight gain [Hiatus Hernia] : no hiatus hernia [Peptic Ulcer Disease] : no peptic ulcer disease [Pancreatitis] : no pancreatitis [Cholelithiasis] : no cholelithiasis [Kidney Stone] : no kidney stone [Inflammatory Bowel Disease] : no inflammatory bowel disease [Diverticulitis] : no diverticulitis [Alcohol Abuse] : no alcohol abuse [Malignancy] : no malignancy [Abdominal Surgery] : no abdominal surgery [Appendectomy] : no appendectomy [Cholecystectomy] : no cholecystectomy [de-identified] : Patient is a 77-year-old male with a large hiatal hernia not desiring repair with occasional GERD.  He uses pantoprazole only occasionally.  He also has had a 2-month history of "lower quadrant discomfort.  He has had a previous colonoscopy over the last 2 years.  No weight loss anorexia fever or chills or rectal bleeding.\par \par Returned recently from [place] and has regular bowel movements with a foul smell.  He is using a colonic purgative that he purchased in [place].  There is no fever or chills.  He is no longer using any pantoprazole.  He saw his pulmonologist Eron Johnson yesterday [FreeTextEntry1] : Patient is a 76-year-old male with a large hiatal hernia not desiring repair with occasional GERD.  He uses pantoprazole only occasionally.  He also has had a 2-month history of "lower quadrant discomfort.  He has had a previous colonoscopy over the last 2 years.  No weight loss anorexia fever or chills or rectal bleeding.

## 2022-07-28 LAB
ALBUMIN SERPL ELPH-MCNC: 3.8 G/DL
ALP BLD-CCNC: 86 U/L
ALT SERPL-CCNC: 20 U/L
ANION GAP SERPL CALC-SCNC: 11 MMOL/L
AST SERPL-CCNC: 23 U/L
BASOPHILS # BLD AUTO: 0.04 K/UL
BASOPHILS NFR BLD AUTO: 0.6 %
BILIRUB SERPL-MCNC: 0.3 MG/DL
BUN SERPL-MCNC: 11 MG/DL
CALCIUM SERPL-MCNC: 7.5 MG/DL
CHLORIDE SERPL-SCNC: 108 MMOL/L
CO2 SERPL-SCNC: 20 MMOL/L
CREAT SERPL-MCNC: 1.02 MG/DL
CRP SERPL-MCNC: 3 MG/L
EGFR: 75 ML/MIN/1.73M2
EOSINOPHIL # BLD AUTO: 0.16 K/UL
EOSINOPHIL NFR BLD AUTO: 2.4 %
GLUCOSE SERPL-MCNC: 84 MG/DL
HCT VFR BLD CALC: 40.2 %
HGB BLD-MCNC: 12.7 G/DL
IMM GRANULOCYTES NFR BLD AUTO: 0.4 %
LYMPHOCYTES # BLD AUTO: 3.15 K/UL
LYMPHOCYTES NFR BLD AUTO: 46.9 %
MAN DIFF?: NORMAL
MCHC RBC-ENTMCNC: 29.4 PG
MCHC RBC-ENTMCNC: 31.6 GM/DL
MCV RBC AUTO: 93.1 FL
MONOCYTES # BLD AUTO: 0.73 K/UL
MONOCYTES NFR BLD AUTO: 10.9 %
NEUTROPHILS # BLD AUTO: 2.6 K/UL
NEUTROPHILS NFR BLD AUTO: 38.8 %
PLATELET # BLD AUTO: 170 K/UL
POTASSIUM SERPL-SCNC: 4.4 MMOL/L
PROT SERPL-MCNC: 6.3 G/DL
RBC # BLD: 4.32 M/UL
RBC # FLD: 14.9 %
SODIUM SERPL-SCNC: 139 MMOL/L
WBC # FLD AUTO: 6.71 K/UL

## 2022-08-03 NOTE — DISCUSSION/SUMMARY
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 Based upon the patients continued symptoms and failure to respond to conservative treatment I have recommended a [ Right / Left Total Knee Replacement ]for this patient. A long discussion took place with the patient describing what a total joint replacement is and what the procedure would entail. A [left total Knee ] model, similar to the implant that will be used during the operation was utilized to demonstrate and to discuss the various bearing surfaces of the implants.\par The benefits of surgery were discussed with the patient including the potential for improving his/her current clinical condition through operative intervention. Alternatives to surgical intervention including continued conservative management were also discussed in detail. \par  The hospitalization and post-operative care and rehabilitation were also discussed. The use of perioperative antibiotics and DVT prophylaxis were discussed. The risk, benefits and alternatives to a surgical intervention were discussed at length with the patient. The patient was also advised of risks related to the medical comorbidities and elevated body mass index (BMI).  A lengthy discussion took place to review the most common complications including but not limited to: deep vein thrombosis, pulmonary embolus, heart attack, stroke, infection, wound breakdown, numbness, damage to nerves, tendon, muscles, arteries or other blood vessels, death and other possible complications from anesthesia. The patient was told that we will take steps to minimize these risks by using sterile technique, antibiotics and DVT prophylaxis when appropriate and follow the patient postoperatively in the office setting to monitor progress. The possibility of recurrent pain, no improvement in pain and actual worsening of pain were also discussed with the patient. \par The discharge plan of care focused on the patient going home following surgery.  I encouraged the patient to make the necessary arrangements to have someone stay with them when they are discharged home.  Following discharge, a home care nurse will visit the patient.  He/she will open your home care case and request home physical therapy services.  Home physical therapy will commence following discharge provided it is appropriate and covered by [his /her] health insurance benefit plan.  \par All questions were answered to the satisfaction of the patient. The treatment plan of care, as well as a model of a Total [ Knee ] equivalent to the one that will be used for their total joint replacement, was shared with the patient.  The patient agreed to the plan of care as well as the use of implants in their total joint replacement.\par The patient was advised that they will require a medical preoperative risk evaluation by their PCP. Further medical subspecialty clearances such as cardiac may be indicated if felt needed by their PCP.\par The patient participated in an interactive discussion of the left TKR implant planned for their surgery with questions answered, agreed with the treatment plan, and has decided to move forward with elective left TKR as planned.  Patient needs medical and cardiac clearance.  All of his questions were answered to his satisfaction.\par \par

## 2022-08-03 NOTE — PROCEDURE
[Injection] : Injection [Left] : of the left [Effusion] : Effusion [Osteoarthritis] : Osteoarthritis [Inflammation] : Inflammation [Diagnostic] : Diagnostic [Joint Pain] : Joint pain [Patient] : patient [Risk] : risk [Alternatives] : alternatives [Bleeding] : bleeding [Infection] : infection [Allergic Reaction] : allergic reaction [Verbal Consent Obtained] : verbal consent was obtained prior to the procedure [Ethyl Chloride Spray] : ethyl chloride spray was used as a topical anesthetic [Medial] : medial [18] : an 18-gauge [1% Lidocaine___(mL)] : [unfilled] mL of 1% Lidocaine [Tolerated Well] : The patient tolerated the procedure well [None] : none [PRN] : as needed [FreeTextEntry1] : Chlorhexidine [FreeTextEntry8] : Gel 1

## 2022-08-03 NOTE — HISTORY OF PRESENT ILLNESS
[Worsening] : worsening [9] : a current pain level of 9/10 [7] : an average pain level of 7/10 [4] : a minimum pain level of 4/10 [10] : a maximum pain level of 10/10 [Standing] : standing [Lifting] : lifting [Daily] : ~He/She~ states the symptoms seem to be occuring daily [Bending] : worsened by bending [Sitting] : worsened by sitting [Running] : worsened by running [Walking] : worsened by walking [Knee Flexion] : worsened with knee flexion [Knee Extension] : worsened with knee extension [Acetaminophen] : relieved by acetaminophen [Exercise Regimen] : relieved by exercise regimen [Heat] : relieved by heat [Ice] : relieved by ice [NSAIDs] : relieved by nonsteroidal anti-inflammatory drugs [Physical Therapy] : relieved by physical therapy [Rest] : relieved by rest [de-identified] : Right total knee replacement 8/21/19\par The patient is a 77-year-old male well-known to this office.  The patient is status post a right total knee replacement in August 21, 2019 with excellent results.  The patient has an established diagnosis of DJD to the left knee affecting his activities of daily living for many years getting more pronounced over the past several months.  Over the years the patient had cortisone injections, viscosupplementation injections, anti-inflammatories and other conservative treatment modalities.  He is an avid hiker and hikes all over the [place].  The patient would like to pursue surgery on the left knee sometime in February 2023.  He would like a viscosupplementation injection today to get him through the summer.  At times, he has difficulty standing for long periods of time, walking for long periods of time, and negotiating stairs. [Ataxia] : no ataxia [Incontinence] : no incontinence [Loss of Dexterity] : good dexterity [Urinary Ret.] : no urinary retention

## 2022-08-03 NOTE — PHYSICAL EXAM
[Antalgic] : antalgic [UE/LE] : Sensory: Intact in bilateral upper & lower extremities [ALL] : dorsalis pedis, posterior tibial, femoral, popliteal, and radial 2+ and symmetric bilaterally [Normal RUE] : Right Upper Extremity: No scars, rashes, lesions, ulcers, skin intact [Normal LUE] : Left Upper Extremity: No scars, rashes, lesions, ulcers, skin intact [Normal RLE] : Right Lower Extremity: No scars, rashes, lesions, ulcers, skin intact [Normal LLE] : Left Lower Extremity: No scars, rashes, lesions, ulcers, skin intact [Normal] : No costovertebral angle tenderness and no spinal tenderness [de-identified] : Well-healed surgical scar to the right knee without erythema, drainage, or evidence of cellulitis.  Less than 5 degree laxity with varus valgus stresses.  Negative anteroposterior drawer.  No extension lag.  No flexion contractures.  Range of motion 0-1 35.  Calves are soft, nontender, no evidence of DVT at this time.  Patient has good motor and sensory to both legs.\par Skin is clean, dry, intact to the left knee.  20 degree varus malalignment.  Positive medial Steinmann test.  Medial joint line tenderness.  Moderate effusion.  Range of motion 0-1 20 with pain on further flexion crepitus at the medial femoral-tibial compartment and the patellofemoral joint. [de-identified] : Severe tricompartmental DJD to the left knee with multiple subchondral cyst, periarticular osteophytes, bone-on-bone contact medial femoral-tibial compartment and patellofemoral joint with a varus malalignment.

## 2022-08-03 NOTE — REASON FOR VISIT
[Follow-Up Visit] : a follow-up visit for [Artificial Knee Joint] : artificial knee joint [Knee Pain] : knee pain [Osteoarthritis, Knee] : osteoarthritis, knee [FreeTextEntry2] : Right total knee replacement 8/21/19

## 2022-08-11 ENCOUNTER — APPOINTMENT (OUTPATIENT)
Dept: RADIOLOGY | Facility: IMAGING CENTER | Age: 77
End: 2022-08-11

## 2022-08-11 ENCOUNTER — RESULT REVIEW (OUTPATIENT)
Age: 77
End: 2022-08-11

## 2022-08-11 ENCOUNTER — OUTPATIENT (OUTPATIENT)
Dept: OUTPATIENT SERVICES | Facility: HOSPITAL | Age: 77
LOS: 1 days | End: 2022-08-11
Payer: MEDICARE

## 2022-08-11 DIAGNOSIS — Z98.890 OTHER SPECIFIED POSTPROCEDURAL STATES: Chronic | ICD-10-CM

## 2022-08-11 DIAGNOSIS — E83.51 HYPOCALCEMIA: ICD-10-CM

## 2022-08-11 DIAGNOSIS — Z98.49 CATARACT EXTRACTION STATUS, UNSPECIFIED EYE: Chronic | ICD-10-CM

## 2022-08-11 PROCEDURE — 77080 DXA BONE DENSITY AXIAL: CPT | Mod: 26

## 2022-08-11 PROCEDURE — 77080 DXA BONE DENSITY AXIAL: CPT

## 2022-08-29 ENCOUNTER — APPOINTMENT (OUTPATIENT)
Dept: CARDIOLOGY | Facility: CLINIC | Age: 77
End: 2022-08-29
Payer: MEDICARE

## 2022-08-29 ENCOUNTER — NON-APPOINTMENT (OUTPATIENT)
Age: 77
End: 2022-08-29

## 2022-08-29 VITALS
HEART RATE: 58 BPM | WEIGHT: 170 LBS | SYSTOLIC BLOOD PRESSURE: 138 MMHG | DIASTOLIC BLOOD PRESSURE: 80 MMHG | OXYGEN SATURATION: 100 % | BODY MASS INDEX: 26.68 KG/M2 | HEIGHT: 67 IN | RESPIRATION RATE: 16 BRPM

## 2022-08-29 DIAGNOSIS — I25.84 ATHEROSCLEROTIC HEART DISEASE OF NATIVE CORONARY ARTERY W/OUT ANGINA PECTORIS: ICD-10-CM

## 2022-08-29 DIAGNOSIS — I25.10 ATHEROSCLEROTIC HEART DISEASE OF NATIVE CORONARY ARTERY W/OUT ANGINA PECTORIS: ICD-10-CM

## 2022-08-29 PROCEDURE — 99215 OFFICE O/P EST HI 40 MIN: CPT

## 2022-08-29 PROCEDURE — 93000 ELECTROCARDIOGRAM COMPLETE: CPT

## 2022-09-07 ENCOUNTER — APPOINTMENT (OUTPATIENT)
Dept: GASTROENTEROLOGY | Facility: CLINIC | Age: 77
End: 2022-09-07

## 2022-09-07 VITALS
OXYGEN SATURATION: 97 % | BODY MASS INDEX: 27.1 KG/M2 | DIASTOLIC BLOOD PRESSURE: 70 MMHG | HEART RATE: 70 BPM | RESPIRATION RATE: 12 BRPM | SYSTOLIC BLOOD PRESSURE: 125 MMHG | WEIGHT: 173 LBS

## 2022-09-07 DIAGNOSIS — R07.9 CHEST PAIN, UNSPECIFIED: ICD-10-CM

## 2022-09-07 PROCEDURE — 99214 OFFICE O/P EST MOD 30 MIN: CPT

## 2022-09-07 NOTE — HISTORY OF PRESENT ILLNESS
[Heartburn] : denies heartburn [Nausea] : denies nausea [Vomiting] : denies vomiting [Diarrhea] : denies diarrhea [Constipation] : denies constipation [Yellow Skin Or Eyes (Jaundice)] : denies jaundice [Abdominal Swelling] : denies abdominal swelling [Rectal Pain] : denies rectal pain [Abdominal Pain] : abdominal pain [GERD] : gastroesophageal reflux disease [Irritable Bowel Syndrome] : irritable bowel syndrome [Wt Gain ___ Lbs] : no recent weight gain [Hiatus Hernia] : no hiatus hernia [Peptic Ulcer Disease] : no peptic ulcer disease [Pancreatitis] : no pancreatitis [Cholelithiasis] : no cholelithiasis [Kidney Stone] : no kidney stone [Inflammatory Bowel Disease] : no inflammatory bowel disease [Diverticulitis] : no diverticulitis [Alcohol Abuse] : no alcohol abuse [Malignancy] : no malignancy [Abdominal Surgery] : no abdominal surgery [Appendectomy] : no appendectomy [Cholecystectomy] : no cholecystectomy [de-identified] : Patient is a 77-year-old male with a large hiatal hernia not desiring repair with occasional GERD.  He uses pantoprazole only occasionally.  He also has had a 2-month history of "lower quadrant discomfort.  He has had a previous colonoscopy over the last 2 years.  No weight loss anorexia fever or chills or rectal bleeding.\par \par Returned recently from [place] and has regular bowel movements with a foul smell.  He is using a colonic purgative that he purchased in [place].  There is no fever or chills.  He is no longer using any pantoprazole.  He saw his pulmonologist Eron Johnson yesterday\par \par \par 09/7/22- returns in followup today; Used OTC prevacid x 14 days and felt better, with improved GERD, eructation.  Saw Dr. Montoya and pending  stress test/MPI. Complains of "increased gas." Hx of diverticulosis.   [FreeTextEntry1] : Patient is a 76-year-old male with a large hiatal hernia not desiring repair with occasional GERD.  He uses pantoprazole only occasionally.  He also has had a 2-month history of "lower quadrant discomfort.  He has had a previous colonoscopy over the last 2 years.  No weight loss anorexia fever or chills or rectal bleeding.

## 2022-09-14 DIAGNOSIS — Z01.818 ENCOUNTER FOR OTHER PREPROCEDURAL EXAMINATION: ICD-10-CM

## 2022-09-16 ENCOUNTER — APPOINTMENT (OUTPATIENT)
Dept: ORTHOPEDIC SURGERY | Facility: HOSPITAL | Age: 77
End: 2022-09-16

## 2022-09-20 ENCOUNTER — NON-APPOINTMENT (OUTPATIENT)
Age: 77
End: 2022-09-20

## 2022-09-20 ENCOUNTER — APPOINTMENT (OUTPATIENT)
Dept: ORTHOPEDIC SURGERY | Facility: CLINIC | Age: 77
End: 2022-09-20

## 2022-09-20 VITALS
WEIGHT: 174.1 LBS | HEART RATE: 62 BPM | HEIGHT: 67 IN | BODY MASS INDEX: 27.33 KG/M2 | SYSTOLIC BLOOD PRESSURE: 120 MMHG | DIASTOLIC BLOOD PRESSURE: 72 MMHG

## 2022-09-20 DIAGNOSIS — G89.29 PAIN IN LEFT KNEE: ICD-10-CM

## 2022-09-20 DIAGNOSIS — M25.561 PAIN IN RIGHT KNEE: ICD-10-CM

## 2022-09-20 DIAGNOSIS — M25.562 PAIN IN LEFT KNEE: ICD-10-CM

## 2022-09-20 PROCEDURE — 20610 DRAIN/INJ JOINT/BURSA W/O US: CPT | Mod: RT

## 2022-09-20 PROCEDURE — 99212 OFFICE O/P EST SF 10 MIN: CPT | Mod: 25

## 2022-09-20 RX ADMIN — METHYLPREDNISOLONE ACETATE 2 MG/ML: 40 INJECTION, SUSPENSION INTRA-ARTICULAR; INTRALESIONAL; INTRAMUSCULAR; SOFT TISSUE at 00:00

## 2022-09-20 RX ADMIN — LIDOCAINE HYDROCHLORIDE 3 %: 10 INJECTION, SOLUTION INFILTRATION; PERINEURAL at 00:00

## 2022-09-23 RX ORDER — LIDOCAINE HYDROCHLORIDE 10 MG/ML
1 INJECTION, SOLUTION INFILTRATION; PERINEURAL
Refills: 0 | Status: COMPLETED | OUTPATIENT
Start: 2022-09-20

## 2022-09-23 RX ORDER — METHYLPRED ACET/NACL,ISO-OS/PF 40 MG/ML
40 VIAL (ML) INJECTION
Qty: 1 | Refills: 0 | Status: COMPLETED | OUTPATIENT
Start: 2022-09-20

## 2022-09-27 ENCOUNTER — APPOINTMENT (OUTPATIENT)
Dept: CARDIOLOGY | Facility: CLINIC | Age: 77
End: 2022-09-27
Payer: MEDICARE

## 2022-09-27 PROCEDURE — 93015 CV STRESS TEST SUPVJ I&R: CPT

## 2022-09-27 PROCEDURE — A9500: CPT

## 2022-09-27 PROCEDURE — 78452 HT MUSCLE IMAGE SPECT MULT: CPT

## 2022-10-03 ENCOUNTER — APPOINTMENT (OUTPATIENT)
Dept: GASTROENTEROLOGY | Facility: CLINIC | Age: 77
End: 2022-10-03

## 2022-10-03 VITALS
HEART RATE: 77 BPM | OXYGEN SATURATION: 99 % | BODY MASS INDEX: 27.94 KG/M2 | TEMPERATURE: 98.2 F | HEIGHT: 67 IN | WEIGHT: 178 LBS | RESPIRATION RATE: 12 BRPM | SYSTOLIC BLOOD PRESSURE: 98 MMHG | DIASTOLIC BLOOD PRESSURE: 60 MMHG

## 2022-10-03 VITALS — WEIGHT: 175 LBS | BODY MASS INDEX: 27.41 KG/M2

## 2022-10-03 DIAGNOSIS — E83.51 HYPOCALCEMIA: ICD-10-CM

## 2022-10-03 PROCEDURE — 99214 OFFICE O/P EST MOD 30 MIN: CPT

## 2022-10-03 NOTE — REASON FOR VISIT
[Follow-up] : a follow-up of an existing diagnosis [FreeTextEntry1] : hiatal hernia with eructation

## 2022-10-03 NOTE — HISTORY OF PRESENT ILLNESS
[FreeTextEntry1] : 77 with large hiatal hernia and patient not desiring repair with GERD and eructation.  He uses pantoprazole sparingly.  His lower abdominal discomfort has resolved.  He has gained approximately 5 pounds which is I discussed with the patient contributes to worsening symptoms.  There is no dysphagia or vomiting.  He had used Prevacid over-the-counter for 14 days and felt a bit better. Recent nuclear stress test, reviewed with pt was without ischemia. [de-identified] : 12/2021- renal cysts, hepatic cyst; colonic diverticulosis

## 2022-10-03 NOTE — ASSESSMENT
[FreeTextEntry1] : 77 with large hiatal hernia and patient not desiring repair with GERD and eructation.  He uses pantoprazole sparingly.  His lower abdominal discomfort has resolved.  He has gained approximately 5 pounds which is I discussed with the patient contributes to worsening symptoms.  There is no dysphagia or vomiting.  He had used Prevacid over-the-counter for 14 days and felt a bit better. Recent nuclear stress test, reviewed with pt was without ischemia.\par \par He  was advised to undergo endoscopy to which he agreed. The procedure will be performed in [facility] with the assistance of an anesthesiologist. The procedure was explained in detail and he understood the risks of the procedure not limited  to infection, bleeding, perforation, risk of anesthesia and risk of IV site problems,emergency surgery, missed lesions  or non-diagnosis of stomach or esophageal  cancer.He/She]  was advised that he could not drive home alone, if the patient chooses to receive sedation. Further diagnostic and treatment recommendations will be based upon the procedure and any biopsies, if they are taken.\par \par Gaviscon PRN advised\par \par Weight loss, life-style mgmt reviewed with pt to reduce gerd.

## 2022-10-03 NOTE — REVIEW OF SYSTEMS
[Recent Weight Gain (___ Lbs)] : recent [unfilled] ~Ulb weight gain [Negative] : Heme/Lymph [Heartburn] : heartburn [As Noted in HPI] : as noted in HPI [Arthralgias (joint pain)] : arthralgias

## 2022-10-20 ENCOUNTER — APPOINTMENT (OUTPATIENT)
Dept: GASTROENTEROLOGY | Facility: AMBULATORY SURGERY CENTER | Age: 77
End: 2022-10-20

## 2022-10-20 ENCOUNTER — RESULT REVIEW (OUTPATIENT)
Age: 77
End: 2022-10-20

## 2022-10-20 PROCEDURE — 43239 EGD BIOPSY SINGLE/MULTIPLE: CPT

## 2022-11-15 ENCOUNTER — APPOINTMENT (OUTPATIENT)
Dept: ORTHOPEDIC SURGERY | Facility: CLINIC | Age: 77
End: 2022-11-15

## 2022-11-15 VITALS — DIASTOLIC BLOOD PRESSURE: 64 MMHG | SYSTOLIC BLOOD PRESSURE: 113 MMHG | HEART RATE: 65 BPM

## 2022-11-15 DIAGNOSIS — M17.12 UNILATERAL PRIMARY OSTEOARTHRITIS, LEFT KNEE: ICD-10-CM

## 2022-11-15 PROCEDURE — 99213 OFFICE O/P EST LOW 20 MIN: CPT

## 2022-11-15 PROCEDURE — 73562 X-RAY EXAM OF KNEE 3: CPT | Mod: LT

## 2022-11-21 ENCOUNTER — APPOINTMENT (OUTPATIENT)
Dept: PULMONOLOGY | Facility: CLINIC | Age: 77
End: 2022-11-21

## 2022-11-21 ENCOUNTER — NON-APPOINTMENT (OUTPATIENT)
Age: 77
End: 2022-11-21

## 2022-11-21 VITALS
OXYGEN SATURATION: 97 % | HEIGHT: 67 IN | SYSTOLIC BLOOD PRESSURE: 120 MMHG | DIASTOLIC BLOOD PRESSURE: 78 MMHG | HEART RATE: 60 BPM | RESPIRATION RATE: 12 BRPM | BODY MASS INDEX: 27.47 KG/M2 | WEIGHT: 175 LBS | TEMPERATURE: 97.9 F

## 2022-11-21 DIAGNOSIS — R09.82 POSTNASAL DRIP: ICD-10-CM

## 2022-11-21 PROCEDURE — 94010 BREATHING CAPACITY TEST: CPT

## 2022-11-21 PROCEDURE — 95012 NITRIC OXIDE EXP GAS DETER: CPT

## 2022-11-21 PROCEDURE — 99214 OFFICE O/P EST MOD 30 MIN: CPT | Mod: 25

## 2022-11-21 RX ORDER — DESLORATADINE 5 MG/1
5 TABLET ORAL DAILY
Qty: 90 | Refills: 1 | Status: ACTIVE | COMMUNITY
Start: 2022-11-21 | End: 1900-01-01

## 2022-11-21 NOTE — PROCEDURE
[FreeTextEntry1] : PFT - spi reveals mild obstructive flows; FEV1 is 1.55 which is 74% of predicted, normal flow volume loop\par \par FENO was   8    ; a normal value being less than 25\par Fractional exhaled nitric oxide (FENO) is regarded as a simple, noninvasive method for assessing eosinophilic airway inflammation. Produced by a variety of cells within the lung, nitric oxide (NO) concentrations are generally low in healthy individuals. However, high concentrations of NO appear to be involved in nonspecific host defense mechanisms and chronic inflammatory diseases such as asthma. The American Thoracic Society (ATS) therefore has recommended using FENO to aid in the diagnosis and monitoring of eosinophilic airway inflammation and asthma, and for identifying steroid responsive individuals whose chronic respiratory symptoms may be caused by airway inflammation.

## 2022-11-21 NOTE — ASSESSMENT
[FreeTextEntry1] : Mr. LIVINGSTON is a 77 year old male with a history of nonsmoker, benign familial tremor, BPH, chronic sinus issues, GERD, OA, low vitamin D, lumbar disc disease who now comes to the office for a follow up pulmonary evaluation for asthma, allergy, and GERD.- noncompliant - #1 issue is sinus congestion/ Pnd\par \par His shortness of breath is multifactorial due to:\par -poor mechanics of breathing \par -out of shape / overweight\par -pulmonary disease\par      -Severe persistent asthma\par -cardiac disease \par \par problem 1: Severe Persistent Asthma (noncompliant)- semi controlled \par -s/p Prednisone 20 mg x 7 days, 10 mg x 7 days 12/2021\par Information sheet given to the patient to be reviewed, this medication is never to be used without consulting the prescribing physician. Proper dietary restraint is necessary specifically salt containing foods, if any reaction may occur should be reported. \par -add Singulair 10 mg QHS (NC)\par -continue Symbicort (160) 2 inhalations BID (generic) (NC) = (from [place])\par -continue Ventolin 2 puffs Q6H, pre-exercise\par - Asthma is believed to be caused by inherited (genetic) and environmental factor, but its exact cause is unknown. Asthma may be triggered by allergens, lung infections, or irritants in the air. Asthma triggers are different for each person \par \par Problem 1A: Elevated IgE\par -Patient refused Xolair \par -Xolair is a recombinant DNA- derived humanized IgG1K monoclonal antibody that selectively binds to human immunoglobulin E (IgE). Xolair is produced by a Chinese hamster ovary cell suspension culture in nutrient medium containing the antibiotic gentamicin. Gentamicin is not detectable in the final product. Xolair is a sterile, white, preservative free, lyophilized powder contained in a single use vial that is reconstituted with sterile water for suspension. Side effects include: wheezing, tightness of the chest, trouble breathing, hives, skin rash, feeling anxious or light-headed, fainting, warmth or tingling under skin, or swelling of face, lips, or tongue \par \par Problem 2: allergies/sinus- active\par - continue Flonase 1 sniff/nostril BID/ - Astelin 0.15% 1 sniff/nostril BID  = Dymista 1 BID\par -Add Clarinex 5 mg qHS \par - continue Singulair 10 mg QHS\par -continue Atrovent 0.6% at 1 sniff/nostril, up to TID \par - Recommended Navage\par - Continue Clarinex 5 mg before bed \par - s/p Blood work to include: asthma panel (+), food IgE panel (+), IgE level (+: 117), eosinophil level (wnl), vitamin D level (wnl)\par - Environmental measures for allergies were encouraged including mattress and pillow covers, air purifier, and environmental controls.\par \par Problem 3: GERD (Homeopathic treatment) - Constantino\par - transition from Protonix 40 mg QAM, pre-breakfast to Pepcid 40 mg QHS\par - Recommended organic apple cider vinegar tablets \par -Kefir yogurt, hold on supplements. \par -Rule of 2s: avoid eating too much, eating too late, eating too spicy, eating two hours before bed.\par -Things to avoid including overeating, spicy foods, tight clothing, eating within three hours of bed, this list is not all inclusive. \par -For treatment of reflux, possible options discussed including diet control, H2 blockers, PPIs, as well as coating motility agents discussed as treatment options. Timing of meals and proximity of last meal to sleep were discussed. If symptoms persist, a formal gastrointestinal evaluation is needed. \par \par problem 4: cardiac disease\par -recommended to continue to follow up with Cardiologist (Aarti Shaikh)\par \par problem 5: poor breathing mechanics\par - Recommended Wim Hof and Buteyko techniques \par -Proper breathing techniques were reviewed with an emphasis of exhalation. Patient instructed to breath in for 1 second and out for four seconds. Patient was encouraged to not talk while walking. \par \par problem 6: out of shape / overweight\par -Recommended Alberto Garrett's 10-day detox diet and book. \par -Weight loss, exercise, and diet control were discussed and are highly encouraged. Treatment options were given such as, aqua therapy, and contacting a nutritionist. Recommended to use the elliptical, stationary bike, less use of treadmill. Mindful eating was explained to the patient Obesity is associated with worsening asthma, shortness of breath, and potential for cardiac disease, diabetes, and other underlying medical conditions\par \par Problem 7a: COVID-19 precautionary Immune Support Recommendations: s/p Moderna vaccine x3 \par OTC Vitamin C 500mg BID \par -OTC Quercetin 250-500mg BID \par -OTC Zinc 75-100mg per day \par -OTC Melatonin 1 or 2mg a night \par -OTC Vitamin D 1-4000mg per day \par -OTC Tonic Water 8oz per day\par -Water 0.5-1 gallon per day\par \par problem 7: health maintenance \par - s/p COVID-19 vax x3 \par -Recommended not to get an additional COVID-19 booster at this time until it is updated against the current variants. If planning on travelling, obtaining another booster within 2 weeks of departure is recommended. \par - Discussed and recommended to wait for the updated booster \par -recommended yearly flu shot - denied \par -recommended strep pneumonia vaccines: Prevnar-13 vaccine (2019), followed by Pneumo vaccine 23 one year following (9/2020)\par -recommended early intervention for Upper Respiratory Infections (URIs)\par -recommended regular osteoporosis evaluations\par -recommended early dermatological evaluations\par -recommended after the age of 50 to the age of 70, colonoscopy every 5 years\par \par F/U in 6-8 weeks.\par He is encouraged to call with any changes, concerns, or questions.

## 2022-11-21 NOTE — ADDENDUM
[FreeTextEntry1] : Documented by Katie Melo acting as a scribe for Dr. Eron Johnson on 11/21/2022 .\par \par All medical record entries made by the Scribe were at my, Dr. Eron Johnson's, direction and personally dictated by me on. I have reviewed the chart and agree that the record accurately reflects my personal performance of the history, physical exam, assessment and plan. I have also personally directed, reviewed, and agree with the discharge instructions.

## 2022-11-21 NOTE — HISTORY OF PRESENT ILLNESS
[FreeTextEntry1] : Mr. Rogers is a 77 year old male with a history of asthma, GERD, SOB, and vitamin D deficiency presenting today for a follow up pulmonary evaluation. His chief complaint is \par \par -he notes having a bronscopy \par - Pt takes pentaprozol\par - Pt works out \par - Pts having knee replacement \par - Pt has slight hoarseness \par - Pt uses flonase \par \par \par \par \par patient denies any headaches, nausea, vomiting, fever, chills, sweats, chest pain, chest pressure, palpitations, coughing, wheezing, fatigue, diarrhea, constipation, dysphagia, myalgias, dizziness, leg swelling, leg pain, itchy eyes, itchy ears, heartburn, reflux or sour taste in the mouth

## 2022-11-22 ENCOUNTER — APPOINTMENT (OUTPATIENT)
Dept: ORTHOPEDIC SURGERY | Facility: CLINIC | Age: 77
End: 2022-11-22

## 2022-12-05 LAB
COVID-19 SPIKE DOMAIN ANTIBODY INTERPRETATION: POSITIVE
SARS-COV-2 AB SERPL IA-ACNC: >250 U/ML
SARS-COV-2 N GENE NPH QL NAA+PROBE: NOT DETECTED

## 2022-12-22 NOTE — OCCUPATIONAL THERAPY INITIAL EVALUATION ADULT - LIVES WITH, PROFILE
Quality 226: Preventive Care And Screening: Tobacco Use: Screening And Cessation Intervention: Patient screened for tobacco use and is an ex/non-smoker Detail Level: Detailed Quality 130: Documentation Of Current Medications In The Medical Record: Current Medications Documented Quality 431: Preventive Care And Screening: Unhealthy Alcohol Use - Screening: Patient not identified as an unhealthy alcohol user when screened for unhealthy alcohol use using a systematic screening method spouse

## 2023-02-03 ENCOUNTER — OUTPATIENT (OUTPATIENT)
Dept: OUTPATIENT SERVICES | Facility: HOSPITAL | Age: 78
LOS: 1 days | End: 2023-02-03
Payer: MEDICARE

## 2023-02-03 VITALS
WEIGHT: 171.96 LBS | HEIGHT: 67 IN | TEMPERATURE: 97 F | OXYGEN SATURATION: 98 % | DIASTOLIC BLOOD PRESSURE: 72 MMHG | RESPIRATION RATE: 15 BRPM | HEART RATE: 64 BPM | SYSTOLIC BLOOD PRESSURE: 123 MMHG

## 2023-02-03 DIAGNOSIS — Z01.818 ENCOUNTER FOR OTHER PREPROCEDURAL EXAMINATION: ICD-10-CM

## 2023-02-03 DIAGNOSIS — M17.12 UNILATERAL PRIMARY OSTEOARTHRITIS, LEFT KNEE: ICD-10-CM

## 2023-02-03 DIAGNOSIS — Z98.49 CATARACT EXTRACTION STATUS, UNSPECIFIED EYE: Chronic | ICD-10-CM

## 2023-02-03 DIAGNOSIS — Z98.890 OTHER SPECIFIED POSTPROCEDURAL STATES: Chronic | ICD-10-CM

## 2023-02-03 DIAGNOSIS — Z96.651 PRESENCE OF RIGHT ARTIFICIAL KNEE JOINT: Chronic | ICD-10-CM

## 2023-02-03 LAB
A1C WITH ESTIMATED AVERAGE GLUCOSE RESULT: 5.8 % — HIGH (ref 4–5.6)
ALBUMIN SERPL ELPH-MCNC: 3.5 G/DL — SIGNIFICANT CHANGE UP (ref 3.3–5)
ALP SERPL-CCNC: 89 U/L — SIGNIFICANT CHANGE UP (ref 30–120)
ALT FLD-CCNC: 19 U/L DA — SIGNIFICANT CHANGE UP (ref 10–60)
ANION GAP SERPL CALC-SCNC: 8 MMOL/L — SIGNIFICANT CHANGE UP (ref 5–17)
APTT BLD: 31.2 SEC — SIGNIFICANT CHANGE UP (ref 27.5–35.5)
AST SERPL-CCNC: 24 U/L — SIGNIFICANT CHANGE UP (ref 10–40)
BILIRUB SERPL-MCNC: 0.4 MG/DL — SIGNIFICANT CHANGE UP (ref 0.2–1.2)
BLD GP AB SCN SERPL QL: SIGNIFICANT CHANGE UP
BUN SERPL-MCNC: 11 MG/DL — SIGNIFICANT CHANGE UP (ref 7–23)
CALCIUM SERPL-MCNC: 8.2 MG/DL — LOW (ref 8.4–10.5)
CHLORIDE SERPL-SCNC: 104 MMOL/L — SIGNIFICANT CHANGE UP (ref 96–108)
CO2 SERPL-SCNC: 25 MMOL/L — SIGNIFICANT CHANGE UP (ref 22–31)
CREAT SERPL-MCNC: 1 MG/DL — SIGNIFICANT CHANGE UP (ref 0.5–1.3)
EGFR: 77 ML/MIN/1.73M2 — SIGNIFICANT CHANGE UP
ESTIMATED AVERAGE GLUCOSE: 120 MG/DL — HIGH (ref 68–114)
GLUCOSE SERPL-MCNC: 77 MG/DL — SIGNIFICANT CHANGE UP (ref 70–99)
HCT VFR BLD CALC: 41.8 % — SIGNIFICANT CHANGE UP (ref 39–50)
HGB BLD-MCNC: 13.7 G/DL — SIGNIFICANT CHANGE UP (ref 13–17)
INR BLD: 1.04 RATIO — SIGNIFICANT CHANGE UP (ref 0.88–1.16)
MCHC RBC-ENTMCNC: 30 PG — SIGNIFICANT CHANGE UP (ref 27–34)
MCHC RBC-ENTMCNC: 32.8 GM/DL — SIGNIFICANT CHANGE UP (ref 32–36)
MCV RBC AUTO: 91.5 FL — SIGNIFICANT CHANGE UP (ref 80–100)
NRBC # BLD: 0 /100 WBCS — SIGNIFICANT CHANGE UP (ref 0–0)
PLATELET # BLD AUTO: 170 K/UL — SIGNIFICANT CHANGE UP (ref 150–400)
POTASSIUM SERPL-MCNC: 4.2 MMOL/L — SIGNIFICANT CHANGE UP (ref 3.5–5.3)
POTASSIUM SERPL-SCNC: 4.2 MMOL/L — SIGNIFICANT CHANGE UP (ref 3.5–5.3)
PROT SERPL-MCNC: 7.3 G/DL — SIGNIFICANT CHANGE UP (ref 6–8.3)
PROTHROM AB SERPL-ACNC: 12 SEC — SIGNIFICANT CHANGE UP (ref 10.5–13.4)
RBC # BLD: 4.57 M/UL — SIGNIFICANT CHANGE UP (ref 4.2–5.8)
RBC # FLD: 14.3 % — SIGNIFICANT CHANGE UP (ref 10.3–14.5)
SODIUM SERPL-SCNC: 137 MMOL/L — SIGNIFICANT CHANGE UP (ref 135–145)
WBC # BLD: 9.42 K/UL — SIGNIFICANT CHANGE UP (ref 3.8–10.5)
WBC # FLD AUTO: 9.42 K/UL — SIGNIFICANT CHANGE UP (ref 3.8–10.5)

## 2023-02-03 PROCEDURE — 93010 ELECTROCARDIOGRAM REPORT: CPT

## 2023-02-03 PROCEDURE — G0463: CPT

## 2023-02-03 PROCEDURE — 93005 ELECTROCARDIOGRAM TRACING: CPT

## 2023-02-03 RX ORDER — PANTOPRAZOLE SODIUM 20 MG/1
1 TABLET, DELAYED RELEASE ORAL
Qty: 0 | Refills: 0 | DISCHARGE

## 2023-02-03 NOTE — H&P PST ADULT - HISTORY OF PRESENT ILLNESS
77 yo male reports left knee pain with osteoarthritis for last 2 years.  he has received cortisone injection without relief.  His pain rated 7/10 at worst when he is weight bearing.  He is scheduled for left total knee replacement on 2/15/2023 @ [facility].

## 2023-02-03 NOTE — CONSULT NOTE ADULT - SUBJECTIVE AND OBJECTIVE BOX
HPI:  77 yo male reports left knee pain with osteoarthritis for last 2 years.  he has received cortisone injection without relief.  His pain rated 7/10 at worst when he is weight bearing.  He is scheduled for left total knee replacement on 2/15/2023 @ [facility].   (2023 10:59)  Patient has no other medical complaints, denies fever, chills, nausea, vomiting, diarrhea, chest pain, shortness of breath. No prior complications with anesthesia, prior to worsening knee pain was able to ambulate 5-6 miles/day.    PAST MEDICAL & SURGICAL HISTORY:  Hiatal hernia      H/O gastritis      Osteoarthritis      GERD (gastroesophageal reflux disease)      Dry eye syndrome, bilateral      COVID-19 vaccine series completed      Osteoarthritis of left knee      S/P arthroscopy of left knee  1993      S/P arthroscopy of right knee  1993      S/P cataract extraction  2010- bilateral      History of right knee joint replacement          ANTIMICROBIAL:    CARDIOVASCULAR:    PULMONARY:    NEUROLOGIC:    ONCOLOGIC:    HEMATOLOGIC:    GATROINTESTINAL:     MEDS:    ENDO/METABOLIC:    IV FLUID/NUTRITION:    TOPICAL:    IMMUNOLOGIC & OTHER        Allergies    No Known Allergies    Intolerances      PAST MEDICAL & SURGICAL HISTORY:  Hiatal hernia      H/O gastritis      Osteoarthritis      GERD (gastroesophageal reflux disease)      Dry eye syndrome, bilateral      COVID-19 vaccine series completed      Osteoarthritis of left knee      S/P arthroscopy of left knee  1993      S/P arthroscopy of right knee  1993      S/P cataract extraction  2010- bilateral      History of right knee joint replacement        SOCIAL HISTORY:    FAMILY HISTORY:  Family history of asthma in father  father-    FHx: myocardial infarction  mother-    Family history of hypertension in mother  mother-        Vital Signs Last 24 Hrs  T(C): 36.2 (2023 10:59), Max: 36.2 (2023 10:59)  T(F): 97.2 (2023 10:59), Max: 97.2 (2023 10:59)  HR: 64 (2023 10:59) (64 - 64)  BP: 123/72 (2023 10:59) (123/72 - 123/72)  BP(mean): 89 (2023 10:59) (89 - 89)  RR: 15 (2023 10:59) (15 - 15)  SpO2: 98% (2023 10:59) (98% - 98%)    Parameters below as of 2023 10:59  Patient On (Oxygen Delivery Method): room air          I&O's Detail    Daily Height in cm: 170.18 (2023 10:59)    Daily     REVIEW OF SYSTEMS:    CONSTITUTIONAL: No fever, weight loss, or fatigue  EYES: No eye pain, visual disturbances, or discharge  ENMT:  No difficulty hearing, tinnitus, vertigo; No sinus or throat pain  NECK: No pain or stiffness  RESPIRATORY: No cough, wheezing, chills or hemoptysis; No shortness of breath  CARDIOVASCULAR: No chest pain, palpitations, dizziness, or leg swelling  GASTROINTESTINAL: No abdominal or epigastric pain. No nausea, vomiting, or hematemesis; No diarrhea or constipation. No melena or hematochezia.  GENITOURINARY: No dysuria, frequency, hematuria, or incontinence  NEUROLOGICAL: No headaches, memory loss, loss of strength, numbness, or tremors  SKIN: No itching, burning, rashes, or lesions   LYMPH NODES: No enlarged glands  ENDOCRINE: No heat or cold intolerance; No hair loss  MUSCULOSKELETAL: No joint pain or swelling; No muscle, back, or extremity pain  PSYCHIATRIC: No depression, anxiety, mood swings, or difficulty sleeping  HEME/LYMPH: No easy bruising, or bleeding gums  ALLERY AND IMMUNOLOGIC: No hives or eczema    PHYSICAL EXAM:  vital signs noted and are stable    GENERAL: NAD, well-groomed, well-developed  HEAD:  Atraumatic, Normocephalic  EYES: EOMI, PERRLA, conjunctiva and sclera clear  ENMT:  Moist mucous membranes No lesions  NECK: Supple, No JVD, Normal thyroid  NERVOUS SYSTEM:  Alert & Oriented X3, no focal deficits  CHEST/LUNG: Clear to percussion bilaterally; No rales, rhonchi, wheezing, or rubs  HEART: Regular rate and rhythm; No murmurs, rubs, or gallops  ABDOMEN: Soft, Nontender, Nondistended; Bowel sounds present  EXTREMITIES:  2+ Peripheral Pulses, No clubbing, cyanosis, or edema  LYMPH: No lymphadenopathy   SKIN: No rashes or lesions      LABS:                        13.7   9.42  )-----------( 170      ( 2023 11:24 )             41.8     02-    137  |  104  |  11  ----------------------------<  77  4.2   |  25  |  1.00    Ca    8.2<L>      2023 11:24    TPro  7.3  /  Alb  3.5  /  TBili  0.4  /  DBili  x   /  AST  24  /  ALT  19  /  AlkPhos  89  02-    PT/INR - ( 2023 11:24 )   PT: 12.0 sec;   INR: 1.04 ratio         PTT - ( 2023 11:24 )  PTT:31.2 sec        PT/INR - ( 2023 11:24 )   PT: 12.0 sec;   INR: 1.04 ratio         PTT - ( 2023 11:24 )  PTT:31.2 sec    RADIOLOGY & ADDITIONAL STUDIES:  EKG: Sinus bradycardia, otherwise normal EKG

## 2023-02-03 NOTE — CONSULT NOTE ADULT - ASSESSMENT
79 yo male, pmh of gastritis on PPI intermittently, OA presenting to PST for evaluation in preparation for left TKR. Patient is a low cardiac risk for moderate risk procedure. No medical contraindication to procedure at this time. Can resume protonix as needed prior to procedure and does not need medication perioperatively except if MRSA swab returns positive for which he can be given mupirocin.

## 2023-02-03 NOTE — H&P PST ADULT - NSICDXPASTSURGICALHX_GEN_ALL_CORE_FT
PAST SURGICAL HISTORY:  History of right knee joint replacement     S/P arthroscopy of left knee 1993    S/P arthroscopy of right knee 1993    S/P cataract extraction 2010- bilateral

## 2023-02-03 NOTE — H&P PST ADULT - PROBLEM SELECTOR PLAN 1
Left total knee replacement is planned for 2/15/2023  Covid testing scheduled for 2/13/2023 @ [facility]   Diagnostic testing performed - patient to be seen my hospitalist today   Pre op instructions were reviewed including mupirocin, chlorhexadine and gatorade protocols  Best wishes offered

## 2023-02-03 NOTE — H&P PST ADULT - NSICDXPASTMEDICALHX_GEN_ALL_CORE_FT
PAST MEDICAL HISTORY:  COVID-19 vaccine series completed     Dry eye syndrome, bilateral     GERD (gastroesophageal reflux disease)     H/O gastritis     Hiatal hernia     Osteoarthritis     Osteoarthritis of left knee

## 2023-02-04 LAB
MRSA PCR RESULT.: SIGNIFICANT CHANGE UP
S AUREUS DNA NOSE QL NAA+PROBE: SIGNIFICANT CHANGE UP

## 2023-02-09 PROBLEM — M17.12 UNILATERAL PRIMARY OSTEOARTHRITIS, LEFT KNEE: Chronic | Status: ACTIVE | Noted: 2023-02-03

## 2023-02-09 PROBLEM — Z92.29 PERSONAL HISTORY OF OTHER DRUG THERAPY: Chronic | Status: ACTIVE | Noted: 2023-02-03

## 2023-02-13 ENCOUNTER — OUTPATIENT (OUTPATIENT)
Dept: OUTPATIENT SERVICES | Facility: HOSPITAL | Age: 78
LOS: 1 days | End: 2023-02-13
Payer: MEDICARE

## 2023-02-13 DIAGNOSIS — Z96.651 PRESENCE OF RIGHT ARTIFICIAL KNEE JOINT: Chronic | ICD-10-CM

## 2023-02-13 DIAGNOSIS — Z98.49 CATARACT EXTRACTION STATUS, UNSPECIFIED EYE: Chronic | ICD-10-CM

## 2023-02-13 DIAGNOSIS — Z98.890 OTHER SPECIFIED POSTPROCEDURAL STATES: Chronic | ICD-10-CM

## 2023-02-13 DIAGNOSIS — Z20.828 CONTACT WITH AND (SUSPECTED) EXPOSURE TO OTHER VIRAL COMMUNICABLE DISEASES: ICD-10-CM

## 2023-02-13 LAB — SARS-COV-2 RNA SPEC QL NAA+PROBE: DETECTED

## 2023-02-13 PROCEDURE — U0003: CPT

## 2023-02-13 PROCEDURE — U0005: CPT

## 2023-02-14 ENCOUNTER — APPOINTMENT (OUTPATIENT)
Dept: GASTROENTEROLOGY | Facility: CLINIC | Age: 78
End: 2023-02-14

## 2023-02-15 ENCOUNTER — APPOINTMENT (OUTPATIENT)
Dept: PULMONOLOGY | Facility: CLINIC | Age: 78
End: 2023-02-15
Payer: MEDICARE

## 2023-02-15 ENCOUNTER — APPOINTMENT (OUTPATIENT)
Dept: ORTHOPEDIC SURGERY | Facility: HOSPITAL | Age: 78
End: 2023-02-15

## 2023-02-15 PROCEDURE — 99213 OFFICE O/P EST LOW 20 MIN: CPT | Mod: CS,95

## 2023-02-15 NOTE — REASON FOR VISIT
[Home] : at home, [unfilled] , at the time of the visit. [Medical Office: ([facility])___] : at the medical office located in  [Patient] : the patient [Follow-Up] : a follow-up visit [Asthma] : asthma [Cough] : cough

## 2023-02-16 NOTE — HISTORY OF PRESENT ILLNESS
[TextBox_4] : Mr. Rogers is a 77 year old male with a history of asthma, GERD, SOB, and vitamin D deficiency who presents via video for sick visit. \par \par His chief complaint is COVID infection. \par \par Patient reports he was scheduled for knee replacement today and was rescheduled due to testing positive for COVID infection yesterday. He reports his symptoms started on Monday. Patient reports his symptoms were dry cough occasionally yellow phlegm, runny nose, chills, nasal congestion, and post nasal drip. \par Patient reports he is currently on Paxlovid since yesterday. He reports he has asthma inhaler but doesn't use them regularly. \par \par Patient denies fever, SOB @ rest or exertion, wheezing.\par

## 2023-02-16 NOTE — REVIEW OF SYSTEMS
[Chills] : chills [Nasal Congestion] : nasal congestion [Postnasal Drip] : postnasal drip [Cough] : cough [Sputum] : sputum [Negative] : Psychiatric [Fever] : no fever [Fatigue] : no fatigue [Recent Wt Gain (___ Lbs)] : ~T no recent weight gain [EDS] : no eds [Poor Appetite] : no poor appetite [Recent Wt Loss (___ Lbs)] : ~T no recent weight loss [Dry Eyes] : no dry eyes [Ear Disturbance] : no ear disturbance [Epistaxis] : no epistaxis [Sore Throat] : no sore throat [Eye Irritation] : no eye irritation [Dry Mouth] : no dry mouth [Sinus Problems] : no sinus problems [Mouth Ulcers] : no mouth ulcers [Poor Dentition] : no poor dentition [Edentulous] : no edentulous [Hemoptysis] : no hemoptysis [Chest Tightness] : no chest tightness [Frequent URIs] : no frequent URIs [Dyspnea] : no dyspnea [Pleuritic Pain] : no pleuritic pain [Wheezing] : no wheezing [A.M. Dry Mouth] : no a.m. dry mouth [SOB on Exertion] : no sob on exertion

## 2023-02-16 NOTE — ASSESSMENT
[FreeTextEntry1] : Mr. Rogers is a 77 year old male with a history of asthma, GERD, SOB, and vitamin D deficiency who presents via video for sick visit. \par \par His chief complaint is COVID infection. \par \par 1. COVID infection\par - complete Paxlovid\par -advised patient to quarantine for 5 days from symptom onset. \par \par 2. Severe Persistent Asthma\par -restart Singulair 10 mg QHS \par -restart Symbicort (160) 2 inhalations BID (generic)\par -restart Ventolin 2 puffs Q6H, pre-exercise\par \par 3. Allergies/sinus\par - continue Flonase 1 sniff/nostril BID/  \par -continue Astelin 0.15% 1 sniff/nostril BID \par -continue Clarinex 5 mg QHS \par \par Patient to follow up with Dr. Johnson as scheduled.\par Patient to call with further questions and concerns.\par Patient verbalizes understanding of care plan and is agreeable.\par

## 2023-02-24 ENCOUNTER — OUTPATIENT (OUTPATIENT)
Dept: OUTPATIENT SERVICES | Facility: HOSPITAL | Age: 78
LOS: 1 days | End: 2023-02-24
Payer: MEDICARE

## 2023-02-24 DIAGNOSIS — Z98.49 CATARACT EXTRACTION STATUS, UNSPECIFIED EYE: Chronic | ICD-10-CM

## 2023-02-24 DIAGNOSIS — Z98.890 OTHER SPECIFIED POSTPROCEDURAL STATES: Chronic | ICD-10-CM

## 2023-02-24 DIAGNOSIS — M17.12 UNILATERAL PRIMARY OSTEOARTHRITIS, LEFT KNEE: ICD-10-CM

## 2023-02-24 DIAGNOSIS — Z01.818 ENCOUNTER FOR OTHER PREPROCEDURAL EXAMINATION: ICD-10-CM

## 2023-02-24 DIAGNOSIS — Z96.651 PRESENCE OF RIGHT ARTIFICIAL KNEE JOINT: Chronic | ICD-10-CM

## 2023-02-24 LAB — BLD GP AB SCN SERPL QL: SIGNIFICANT CHANGE UP

## 2023-02-24 PROCEDURE — G0463: CPT

## 2023-02-24 NOTE — PROGRESS NOTE ADULT - ASSESSMENT
Addendum:  Patient seen and examined.  This was a repeat visit from original PST visit 2 weeks ago. Patient tested positive for COVID and is s/p Paxlovid.  Patient unaware he was infected. No symptoms. Some mild sinus drainage and discomfort. No breathing trouble. Currently no complaints. No further testing required and patient is medically optimized for proposed surgery.

## 2023-02-24 NOTE — PROGRESS NOTE ADULT - SUBJECTIVE AND OBJECTIVE BOX
Returned to PST after left TKR postponed for positive covid testing on 2/13/23. He was treated with 5 days of paxlovid. States that he is feeling well now, without compliants. He was seen by the hospitalist for an updated medical clearance. Will repeat type and screen but other than that, does not require any additional testing since his PST was done on 2/3/23.  Ht 5'6' Wt 79.7 kg  /78  p 64  RR 14  T 36.7  O2 sat 98% Returned to PST after left TKR postponed for positive covid testing on 2/13/23. He was treated with 5 days of paxlovid. States that he is feeling well now, without compliants. He was seen by the hospitalist for an updated medical clearance. Will repeat type and screen but other than that, does not require any additional testing since his PST was done on 2/3/23.  Ht 5'6' Wt 79.7 kg  /78  p 64  RR 14  T 36.7  O2 sat 98%      PHYSICAL EXAM:  GENERAL: NAD, well-groomed, well-developed  HEAD:  Atraumatic, Normocephalic  ENMT: Moist mucous membranes,   NECK: Supple, No JVD, Normal thyroid  NERVOUS SYSTEM:  All 4 extremities mobile, no gross sensory deficits.   CHEST/LUNG: Clear to auscultation bilaterally; No rales, rhonchi, wheezing, or rubs  HEART: Regular rate and rhythm; No murmurs, rubs, or gallops  ABDOMEN: Soft, Nontender, Nondistended; Bowel sounds present  EXTREMITIES:  2+ Peripheral Pulses, No clubbing, cyanosis, or edema

## 2023-02-28 ENCOUNTER — NON-APPOINTMENT (OUTPATIENT)
Age: 78
End: 2023-02-28

## 2023-02-28 ENCOUNTER — APPOINTMENT (OUTPATIENT)
Dept: GASTROENTEROLOGY | Facility: CLINIC | Age: 78
End: 2023-02-28
Payer: MEDICARE

## 2023-02-28 ENCOUNTER — APPOINTMENT (OUTPATIENT)
Dept: ORTHOPEDIC SURGERY | Facility: CLINIC | Age: 78
End: 2023-02-28

## 2023-02-28 VITALS
HEIGHT: 67 IN | OXYGEN SATURATION: 98 % | BODY MASS INDEX: 27.47 KG/M2 | WEIGHT: 175 LBS | HEART RATE: 84 BPM | TEMPERATURE: 97.7 F | DIASTOLIC BLOOD PRESSURE: 74 MMHG | SYSTOLIC BLOOD PRESSURE: 111 MMHG

## 2023-02-28 PROBLEM — Z86.16 PERSONAL HISTORY OF COVID-19: Chronic | Status: ACTIVE | Noted: 2023-02-24

## 2023-02-28 PROCEDURE — 99214 OFFICE O/P EST MOD 30 MIN: CPT

## 2023-02-28 NOTE — ASSESSMENT
[FreeTextEntry1] : 78 with eructation and burping mostly resulting from aerophagia. many of his GERD symptoms have now improved on PPI but this persists. HH characterized as large and measured at 3 cm. Both are above limit for TIF, and surgical repair will likely result in gas bloat this patient. I have asked he focus n diet and eating habits. Esophagram to better assess length and size of hernia. return to office in 2-3 months.

## 2023-02-28 NOTE — HISTORY OF PRESENT ILLNESS
[FreeTextEntry1] : 78 with eructation and GERD referred for TIF. Has HH characterized as 3 cm but referred to as large. GERD symptoms are mostly eructation. reportedly has fast eating by son. No chest pain or dysphagia. Mild burning regurgitation but this is minimal. No esophagitis on EGD. No nausea or vomiting. otherwise has asthmatic issues and will now be going for knee replacement in a week.

## 2023-03-02 ENCOUNTER — APPOINTMENT (OUTPATIENT)
Dept: ORTHOPEDIC SURGERY | Facility: HOSPITAL | Age: 78
End: 2023-03-02

## 2023-03-02 ENCOUNTER — TRANSCRIPTION ENCOUNTER (OUTPATIENT)
Age: 78
End: 2023-03-02

## 2023-03-02 ENCOUNTER — INPATIENT (INPATIENT)
Facility: HOSPITAL | Age: 78
LOS: 1 days | Discharge: HOME | DRG: 470 | End: 2023-03-04
Attending: ORTHOPAEDIC SURGERY | Admitting: ORTHOPAEDIC SURGERY
Payer: MEDICARE

## 2023-03-02 VITALS
TEMPERATURE: 98 F | SYSTOLIC BLOOD PRESSURE: 134 MMHG | WEIGHT: 169.09 LBS | DIASTOLIC BLOOD PRESSURE: 76 MMHG | RESPIRATION RATE: 16 BRPM | HEIGHT: 67 IN | OXYGEN SATURATION: 98 % | HEART RATE: 63 BPM

## 2023-03-02 DIAGNOSIS — Z98.890 OTHER SPECIFIED POSTPROCEDURAL STATES: Chronic | ICD-10-CM

## 2023-03-02 DIAGNOSIS — M17.12 UNILATERAL PRIMARY OSTEOARTHRITIS, LEFT KNEE: ICD-10-CM

## 2023-03-02 DIAGNOSIS — Z98.49 CATARACT EXTRACTION STATUS, UNSPECIFIED EYE: Chronic | ICD-10-CM

## 2023-03-02 DIAGNOSIS — Z96.651 PRESENCE OF RIGHT ARTIFICIAL KNEE JOINT: Chronic | ICD-10-CM

## 2023-03-02 LAB
ANION GAP SERPL CALC-SCNC: 9 MMOL/L — SIGNIFICANT CHANGE UP (ref 5–17)
BLD GP AB SCN SERPL QL: SIGNIFICANT CHANGE UP
BUN SERPL-MCNC: 16 MG/DL — SIGNIFICANT CHANGE UP (ref 7–23)
CALCIUM SERPL-MCNC: 7.7 MG/DL — LOW (ref 8.4–10.5)
CHLORIDE SERPL-SCNC: 101 MMOL/L — SIGNIFICANT CHANGE UP (ref 96–108)
CO2 SERPL-SCNC: 25 MMOL/L — SIGNIFICANT CHANGE UP (ref 22–31)
CREAT SERPL-MCNC: 1.24 MG/DL — SIGNIFICANT CHANGE UP (ref 0.5–1.3)
EGFR: 59 ML/MIN/1.73M2 — LOW
GLUCOSE SERPL-MCNC: 176 MG/DL — HIGH (ref 70–99)
HCT VFR BLD CALC: 39.3 % — SIGNIFICANT CHANGE UP (ref 39–50)
HGB BLD-MCNC: 12.7 G/DL — LOW (ref 13–17)
MCHC RBC-ENTMCNC: 30 PG — SIGNIFICANT CHANGE UP (ref 27–34)
MCHC RBC-ENTMCNC: 32.3 GM/DL — SIGNIFICANT CHANGE UP (ref 32–36)
MCV RBC AUTO: 92.7 FL — SIGNIFICANT CHANGE UP (ref 80–100)
NRBC # BLD: 0 /100 WBCS — SIGNIFICANT CHANGE UP (ref 0–0)
PLATELET # BLD AUTO: 159 K/UL — SIGNIFICANT CHANGE UP (ref 150–400)
POTASSIUM SERPL-MCNC: 5 MMOL/L — SIGNIFICANT CHANGE UP (ref 3.5–5.3)
POTASSIUM SERPL-SCNC: 5 MMOL/L — SIGNIFICANT CHANGE UP (ref 3.5–5.3)
RBC # BLD: 4.24 M/UL — SIGNIFICANT CHANGE UP (ref 4.2–5.8)
RBC # FLD: 14.3 % — SIGNIFICANT CHANGE UP (ref 10.3–14.5)
SODIUM SERPL-SCNC: 135 MMOL/L — SIGNIFICANT CHANGE UP (ref 135–145)
WBC # BLD: 7.92 K/UL — SIGNIFICANT CHANGE UP (ref 3.8–10.5)
WBC # FLD AUTO: 7.92 K/UL — SIGNIFICANT CHANGE UP (ref 3.8–10.5)

## 2023-03-02 PROCEDURE — 99232 SBSQ HOSP IP/OBS MODERATE 35: CPT

## 2023-03-02 PROCEDURE — 27447 TOTAL KNEE ARTHROPLASTY: CPT | Mod: LT

## 2023-03-02 PROCEDURE — 27447 TOTAL KNEE ARTHROPLASTY: CPT | Mod: AS,LT

## 2023-03-02 PROCEDURE — 73560 X-RAY EXAM OF KNEE 1 OR 2: CPT | Mod: 26,LT

## 2023-03-02 DEVICE — INSERT TIB EMPOWR SZ 8 12MM: Type: IMPLANTABLE DEVICE | Site: LEFT | Status: FUNCTIONAL

## 2023-03-02 DEVICE — BONE WAX 2.5GM: Type: IMPLANTABLE DEVICE | Site: LEFT | Status: FUNCTIONAL

## 2023-03-02 DEVICE — IMPLANTABLE DEVICE: Type: IMPLANTABLE DEVICE | Status: FUNCTIONAL

## 2023-03-02 DEVICE — INSERT TIB NONPOROUS UNIV SZ 8 LT: Type: IMPLANTABLE DEVICE | Site: LEFT | Status: FUNCTIONAL

## 2023-03-02 DEVICE — ORTHOALIGN THREADED PIN HEADED: Type: IMPLANTABLE DEVICE | Status: FUNCTIONAL

## 2023-03-02 DEVICE — COMP FEM NON POROUS SZ 7 LT: Type: IMPLANTABLE DEVICE | Site: LEFT | Status: FUNCTIONAL

## 2023-03-02 DEVICE — COMP PATELLA TRI-PEG E-PLUS POLY 9X35MM: Type: IMPLANTABLE DEVICE | Site: LEFT | Status: FUNCTIONAL

## 2023-03-02 RX ORDER — MAGNESIUM HYDROXIDE 400 MG/1
30 TABLET, CHEWABLE ORAL DAILY
Refills: 0 | Status: DISCONTINUED | OUTPATIENT
Start: 2023-03-02 | End: 2023-03-04

## 2023-03-02 RX ORDER — SODIUM CHLORIDE 9 MG/ML
500 INJECTION INTRAMUSCULAR; INTRAVENOUS; SUBCUTANEOUS ONCE
Refills: 0 | Status: COMPLETED | OUTPATIENT
Start: 2023-03-02 | End: 2023-03-02

## 2023-03-02 RX ORDER — SODIUM CHLORIDE 9 MG/ML
1000 INJECTION, SOLUTION INTRAVENOUS
Refills: 0 | Status: DISCONTINUED | OUTPATIENT
Start: 2023-03-03 | End: 2023-03-04

## 2023-03-02 RX ORDER — SENNA PLUS 8.6 MG/1
2 TABLET ORAL AT BEDTIME
Refills: 0 | Status: DISCONTINUED | OUTPATIENT
Start: 2023-03-02 | End: 2023-03-04

## 2023-03-02 RX ORDER — CELECOXIB 200 MG/1
1 CAPSULE ORAL
Qty: 60 | Refills: 0
Start: 2023-03-02 | End: 2023-03-31

## 2023-03-02 RX ORDER — CELECOXIB 200 MG/1
200 CAPSULE ORAL EVERY 12 HOURS
Refills: 0 | Status: DISCONTINUED | OUTPATIENT
Start: 2023-03-03 | End: 2023-03-04

## 2023-03-02 RX ORDER — CEFAZOLIN SODIUM 1 G
2000 VIAL (EA) INJECTION ONCE
Refills: 0 | Status: COMPLETED | OUTPATIENT
Start: 2023-03-02 | End: 2023-03-02

## 2023-03-02 RX ORDER — SODIUM CHLORIDE 9 MG/ML
1000 INJECTION, SOLUTION INTRAVENOUS
Refills: 0 | Status: DISCONTINUED | OUTPATIENT
Start: 2023-03-02 | End: 2023-03-02

## 2023-03-02 RX ORDER — APREPITANT 80 MG/1
40 CAPSULE ORAL ONCE
Refills: 0 | Status: COMPLETED | OUTPATIENT
Start: 2023-03-02 | End: 2023-03-02

## 2023-03-02 RX ORDER — ONDANSETRON 8 MG/1
4 TABLET, FILM COATED ORAL EVERY 6 HOURS
Refills: 0 | Status: DISCONTINUED | OUTPATIENT
Start: 2023-03-02 | End: 2023-03-04

## 2023-03-02 RX ORDER — CHLORHEXIDINE GLUCONATE 213 G/1000ML
1 SOLUTION TOPICAL ONCE
Refills: 0 | Status: COMPLETED | OUTPATIENT
Start: 2023-03-02 | End: 2023-03-02

## 2023-03-02 RX ORDER — ONDANSETRON 8 MG/1
4 TABLET, FILM COATED ORAL ONCE
Refills: 0 | Status: DISCONTINUED | OUTPATIENT
Start: 2023-03-02 | End: 2023-03-02

## 2023-03-02 RX ORDER — APIXABAN 2.5 MG/1
1 TABLET, FILM COATED ORAL
Qty: 28 | Refills: 0
Start: 2023-03-02 | End: 2023-03-15

## 2023-03-02 RX ORDER — OXYCODONE HYDROCHLORIDE 5 MG/1
10 TABLET ORAL
Refills: 0 | Status: DISCONTINUED | OUTPATIENT
Start: 2023-03-02 | End: 2023-03-04

## 2023-03-02 RX ORDER — PANTOPRAZOLE SODIUM 20 MG/1
40 TABLET, DELAYED RELEASE ORAL
Refills: 0 | Status: DISCONTINUED | OUTPATIENT
Start: 2023-03-02 | End: 2023-03-04

## 2023-03-02 RX ORDER — HYDROMORPHONE HYDROCHLORIDE 2 MG/ML
1 INJECTION INTRAMUSCULAR; INTRAVENOUS; SUBCUTANEOUS
Refills: 0 | Status: DISCONTINUED | OUTPATIENT
Start: 2023-03-02 | End: 2023-03-02

## 2023-03-02 RX ORDER — POLYETHYLENE GLYCOL 3350 17 G/17G
17 POWDER, FOR SOLUTION ORAL AT BEDTIME
Refills: 0 | Status: DISCONTINUED | OUTPATIENT
Start: 2023-03-02 | End: 2023-03-04

## 2023-03-02 RX ORDER — ASPIRIN/CALCIUM CARB/MAGNESIUM 324 MG
1 TABLET ORAL
Qty: 28 | Refills: 0
Start: 2023-03-02 | End: 2023-03-15

## 2023-03-02 RX ORDER — TRANEXAMIC ACID 100 MG/ML
1000 INJECTION, SOLUTION INTRAVENOUS ONCE
Refills: 0 | Status: COMPLETED | OUTPATIENT
Start: 2023-03-02 | End: 2023-03-02

## 2023-03-02 RX ORDER — CEFAZOLIN SODIUM 1 G
2000 VIAL (EA) INJECTION EVERY 8 HOURS
Refills: 0 | Status: COMPLETED | OUTPATIENT
Start: 2023-03-02 | End: 2023-03-02

## 2023-03-02 RX ORDER — HYDROMORPHONE HYDROCHLORIDE 2 MG/ML
0.5 INJECTION INTRAMUSCULAR; INTRAVENOUS; SUBCUTANEOUS
Refills: 0 | Status: DISCONTINUED | OUTPATIENT
Start: 2023-03-02 | End: 2023-03-02

## 2023-03-02 RX ORDER — DEXAMETHASONE 0.5 MG/5ML
8 ELIXIR ORAL ONCE
Refills: 0 | Status: DISCONTINUED | OUTPATIENT
Start: 2023-03-02 | End: 2023-03-02

## 2023-03-02 RX ORDER — ACETAMINOPHEN 500 MG
1000 TABLET ORAL ONCE
Refills: 0 | Status: COMPLETED | OUTPATIENT
Start: 2023-03-02 | End: 2023-03-02

## 2023-03-02 RX ORDER — OXYCODONE HYDROCHLORIDE 5 MG/1
5 TABLET ORAL
Refills: 0 | Status: DISCONTINUED | OUTPATIENT
Start: 2023-03-02 | End: 2023-03-04

## 2023-03-02 RX ORDER — BENZOCAINE AND MENTHOL 5; 1 G/100ML; G/100ML
1 LIQUID ORAL EVERY 6 HOURS
Refills: 0 | Status: DISCONTINUED | OUTPATIENT
Start: 2023-03-02 | End: 2023-03-04

## 2023-03-02 RX ORDER — DEXAMETHASONE 0.5 MG/5ML
8 ELIXIR ORAL ONCE
Refills: 0 | Status: COMPLETED | OUTPATIENT
Start: 2023-03-03 | End: 2023-03-03

## 2023-03-02 RX ORDER — HYDROMORPHONE HYDROCHLORIDE 2 MG/ML
0.5 INJECTION INTRAMUSCULAR; INTRAVENOUS; SUBCUTANEOUS ONCE
Refills: 0 | Status: DISCONTINUED | OUTPATIENT
Start: 2023-03-02 | End: 2023-03-04

## 2023-03-02 RX ORDER — ACETAMINOPHEN 500 MG
1000 TABLET ORAL EVERY 8 HOURS
Refills: 0 | Status: DISCONTINUED | OUTPATIENT
Start: 2023-03-02 | End: 2023-03-04

## 2023-03-02 RX ORDER — BUDESONIDE AND FORMOTEROL FUMARATE DIHYDRATE 160; 4.5 UG/1; UG/1
2 AEROSOL RESPIRATORY (INHALATION)
Refills: 0 | Status: DISCONTINUED | OUTPATIENT
Start: 2023-03-02 | End: 2023-03-04

## 2023-03-02 RX ORDER — APIXABAN 2.5 MG/1
2.5 TABLET, FILM COATED ORAL
Refills: 0 | Status: DISCONTINUED | OUTPATIENT
Start: 2023-03-03 | End: 2023-03-04

## 2023-03-02 RX ADMIN — Medication 100 MILLIGRAM(S): at 23:20

## 2023-03-02 RX ADMIN — Medication 1000 MILLIGRAM(S): at 21:16

## 2023-03-02 RX ADMIN — HYDROMORPHONE HYDROCHLORIDE 0.5 MILLIGRAM(S): 2 INJECTION INTRAMUSCULAR; INTRAVENOUS; SUBCUTANEOUS at 10:55

## 2023-03-02 RX ADMIN — APREPITANT 40 MILLIGRAM(S): 80 CAPSULE ORAL at 06:48

## 2023-03-02 RX ADMIN — CHLORHEXIDINE GLUCONATE 1 APPLICATION(S): 213 SOLUTION TOPICAL at 06:49

## 2023-03-02 RX ADMIN — Medication 100 MILLIGRAM(S): at 16:09

## 2023-03-02 RX ADMIN — Medication 400 MILLIGRAM(S): at 14:35

## 2023-03-02 RX ADMIN — Medication 1000 MILLIGRAM(S): at 14:38

## 2023-03-02 RX ADMIN — Medication 1000 MILLIGRAM(S): at 21:19

## 2023-03-02 RX ADMIN — SODIUM CHLORIDE 500 MILLILITER(S): 9 INJECTION INTRAMUSCULAR; INTRAVENOUS; SUBCUTANEOUS at 10:36

## 2023-03-02 RX ADMIN — HYDROMORPHONE HYDROCHLORIDE 0.5 MILLIGRAM(S): 2 INJECTION INTRAMUSCULAR; INTRAVENOUS; SUBCUTANEOUS at 10:36

## 2023-03-02 RX ADMIN — SODIUM CHLORIDE 500 MILLILITER(S): 9 INJECTION INTRAMUSCULAR; INTRAVENOUS; SUBCUTANEOUS at 13:48

## 2023-03-02 RX ADMIN — BENZOCAINE AND MENTHOL 1 LOZENGE: 5; 1 LIQUID ORAL at 20:13

## 2023-03-02 RX ADMIN — SENNA PLUS 2 TABLET(S): 8.6 TABLET ORAL at 21:17

## 2023-03-02 NOTE — PRE-OP CHECKLIST - HEIGHT IN CM
EVALUATION IN RECOVERY ROOM    Diagnostic SI joint nerves block  Left   L5 PDR  S1, S2 AND S3 LATERAL BRANCHES    Preprocedure pain score 4/10 at rest    Pain relief 100 %  Improvement in functional level including walking, bending    Pain Diary Provided      Follow Up:      RFA  AVANOS 100-4 MM      
170.18

## 2023-03-02 NOTE — DISCHARGE NOTE PROVIDER - NSDCFUADDAPPT_GEN_ALL_CORE_FT
Follow up Primary Care MD following discharge from facility   Follow up Dr. Weinstein office call office to confirm appt

## 2023-03-02 NOTE — CONSULT NOTE ADULT - SUBJECTIVE AND OBJECTIVE BOX
HPI: Pt is a 79yo M has been combatting pain of left knee for 2years which has progressively worsened even with cortisone injection.  Patient has tried multiple options for pain relief including OTC medication and as well as PT with minimal relief and has undergone elective replacement of  left knee successfully.  Patient is currently resting in bed comfortable with good pain control.     REVIEW OF SYSTEMS:  CONSTITUTIONAL: No fever, weight loss, or fatigue  EYES: No eye pain, visual disturbances, or discharge  ENMT:  No difficulty hearing, tinnitus, vertigo; No sinus or throat pain  NECK: No pain or stiffness  RESPIRATORY: No cough, wheezing, chills or hemoptysis; No shortness of breath  CARDIOVASCULAR: No chest pain, palpitations, dizziness, or leg swelling  GASTROINTESTINAL: No abdominal or epigastric pain. No nausea, vomiting, or hematemesis; No diarrhea or constipation. No melena or hematochezia.  GENITOURINARY: No dysuria, frequency, hematuria, or incontinence  NEUROLOGICAL: No headaches, memory loss, loss of strength, numbness, or tremors  MUSCULOSKELETAL: No muscle or back pain      PAST MEDICAL & SURGICAL HISTORY:  Hiatal hernia      H/O gastritis      Osteoarthritis      GERD (gastroesophageal reflux disease)      Dry eye syndrome, bilateral      COVID-19 vaccine series completed      Osteoarthritis of left knee      History of COVID-19      S/P arthroscopy of left knee        S/P arthroscopy of right knee        S/P cataract extraction  - bilateral      History of right knee joint replacement          SOCIAL HISTORY:  Tobacco; EtOH; Illicit Drugs    Allergies    contrast media (iodine-based) (Rash)    Intolerances        MEDICATIONS  (STANDING):  lactated ringers. 1000 milliLiter(s) (75 mL/Hr) IV Continuous <Continuous>    MEDICATIONS  (PRN):  HYDROmorphone  Injectable 0.5 milliGRAM(s) IV Push every 10 minutes PRN Moderate Pain (4 - 6)  HYDROmorphone  Injectable 1 milliGRAM(s) IV Push every 10 minutes PRN Severe Pain (7 - 10)  ondansetron Injectable 4 milliGRAM(s) IV Push once PRN Nausea and/or Vomiting      FAMILY HISTORY:  Family history of asthma in father  father-    FHx: myocardial infarction  mother-    Family history of hypertension in mother  mother-        Vital Signs Last 24 Hrs  T(C): 36.8 (02 Mar 2023 06:29), Max: 36.8 (02 Mar 2023 06:29)  T(F): 98.3 (02 Mar 2023 06:29), Max: 98.3 (02 Mar 2023 06:29)  HR: 63 (02 Mar 2023 06:29) (63 - 63)  BP: 134/76 (02 Mar 2023 06:29) (134/76 - 134/76)  BP(mean): --  RR: 16 (02 Mar 2023 06:29) (16 - 16)  SpO2: 98% (02 Mar 2023 06:29) (98% - 98%)        PHYSICAL EXAM:    GENERAL: NAD, well-developed  HEAD:  Atraumatic, Normocephalic  EYES: EOMI, PERRLA, conjunctiva and sclera clear  ENMT: No tonsillar erythema, exudates, or enlargement; Moist mucous membranes, Good dentition, No lesions  NECK: Supple, No JVD, Normal thyroid  NERVOUS SYSTEM:  Alert & Oriented X3, Good concentration;  CHEST/LUNG: Clear to auscultation bilaterally; No rales, rhonchi, wheezing, or rubs  HEART: Regular rate and rhythm; No murmurs, rubs, or gallops  ABDOMEN: Soft, Nontender, Nondistended; Bowel sounds present  EXTREMITIES:  2+ Peripheral Pulses, left knee dressed       LABS:              CAPILLARY BLOOD GLUCOSE          RADIOLOGY & ADDITIONAL STUDIES:    EKG:   Personally Reviewed:  [ ] YES     Imaging:   Personally Reviewed:  [ ] YES     Consultant(s) Notes Reviewed:      Care Discussed with Consultants/Other Providers:

## 2023-03-02 NOTE — DISCHARGE NOTE PROVIDER - HOSPITAL COURSE
This patient was admitted to [facility] with a history of severe degenerative joint disease of the Left total knee replacement.  Patient underwent Pre-Surgical Testing and was medically cleared to undergo elective procedure. Patient underwent Left total knee replacement by Dr. Weinstein on 3/2/23 . Procedure was well tolerated.  No operative or keyonna-operative complications arose during patient's hospital course.  Patient received antibiotic according to SCIP guidelines for infection prevention.  X was given for DVT prophylaxis, in addition to the use of SCDs.  Anesthesia, Medical Hospitalist, Physical Therapy and Occupational Therapy were consulted. Patient is stable for discharge with a good prognosis.  Appropriate discharge instructions and medications are provided in this document. This patient was admitted to [facility] with a history of severe degenerative joint disease of the Left total knee replacement.  Patient underwent Pre-Surgical Testing and was medically cleared to undergo elective procedure. Patient underwent Left total knee replacement by Dr. Weinstein on 3/2/23 . Procedure was well tolerated.  No operative or keyonna-operative complications arose during patient's hospital course.  Patient received antibiotic according to SCIP guidelines for infection prevention.  Elquis 2.5mg po bid was given for DVT prophylaxis, in addition to the use of SCDs.  Anesthesia, Medical Hospitalist, Physical Therapy and Occupational Therapy were consulted. Patient is stable for discharge with a good prognosis.  Appropriate discharge instructions and medications are provided in this document.

## 2023-03-02 NOTE — OCCUPATIONAL THERAPY INITIAL EVALUATION ADULT - ADDITIONAL COMMENTS
Lives with his spouse. 6 steps to enter with 1 handrail. Flight of 15 inside with handrail. Tub with doors in his bathroom. pt has a raised toilet seat RW and SAC .

## 2023-03-02 NOTE — DISCHARGE NOTE PROVIDER - NSDCCPTREATMENT_GEN_ALL_CORE_FT
PRINCIPAL PROCEDURE  Procedure: Total left knee replacement  Findings and Treatment: Physical Therapy/Occupational Therapy for: ambulation, transfers, stairs, ADL's (activities of daily living), range of motion exercises, and isometrics  -Activity  • Weight Bearing as tolerated with rolling walker.  • Take short, frequent walks increasing the distance that you walk each day as tolerated.  • Change your position every hour to decrease pain and stiffness.  • Continue the exercises taught to you by your physical therapist.  • No driving until cleared by the doctor.  • No tub baths, hot tubs, or swimming pools until instructed by your doctor.  • Do not squat down on the floor.  • Do not kneel or twist your knee.  • Range of Motion Goals: Flexion= 120 degrees, Extension = 0 degrees  Keep incision clean and dry. May shower 5 days after surgery if no drainage from incision.  Prineo removal 2 weeks after surgery at Surgeon's office.

## 2023-03-02 NOTE — DISCHARGE NOTE PROVIDER - NSDCFUSCHEDAPPT_GEN_ALL_CORE_FT
Lubna Stevens  [facility]  ORTHOSURG [address]  Scheduled Appointment: 03/17/2023    Eron Johnson  [facility]  PULMMED [address]  Scheduled Appointment: 03/27/2023    Kat Weinstein  [facility]  ORTHOSURG [address]  Scheduled Appointment: 04/11/2023    Kat Weinstein  [facility]  ORTHOSURG [address]  Scheduled Appointment: 04/25/2023     Lubna Stevens  [facility]  ORTHOSURG [address]  Scheduled Appointment: 03/17/2023    Eron Johnson  [facility]  [address]ED [address]  Scheduled Appointment: 03/27/2023    Kat Weinstein  [facility]  ORTHOSURG [address]  Scheduled Appointment: 04/25/2023

## 2023-03-02 NOTE — DISCHARGE NOTE PROVIDER - CARE PROVIDER_API CALL
Kat Weinstein)  Orthopedics  [address], [address]  [place] [address]  Phone: [phone]  Fax: [phone]  Scheduled Appointment: 03/17/2023 10:30 AM

## 2023-03-02 NOTE — PHYSICAL THERAPY INITIAL EVALUATION ADULT - ADDITIONAL COMMENTS
Lives in house with spouse.  6 stairs to enter with railing; 15 inside with railing.  Pt has RW, commode, and cane.

## 2023-03-02 NOTE — PATIENT PROFILE ADULT - BILL PAYMENT
Patient received paperwork for advance directive during previous visit but has not completed at this time     Reviewed record In preparation for visit, huddled with provider and have obtained necessary documentation      1. Have you been to the ER, urgent care clinic since your last visit? Hospitalized since your last visit? Yes, [facility], see encounter    2. Have you seen or consulted any other health care providers outside of the [address] [place] [address] since your last visit? Include any pap smears or colon screening.  NO      Dr Sabrina Márquez notified of reason for visit and vitals no

## 2023-03-02 NOTE — CONSULT NOTE ADULT - ASSESSMENT
79 yo male, pmh of gastritis on PPI intermittently, OA admitted to [facility] for left TKR.     *left knee OA s/p TKR post op day 0  -OT/PT eval  -pain and BM regimen per ortho  -DVT ppx  -Incentive spirometry  -obtain basic labs    *Gastritis   79 yo male, pmh of gastritis on PPI intermittently, OA admitted to [facility] for left TKR.     *left knee OA s/p TKR post op day 0  -OT/PT eval  -pain and BM regimen per ortho  -DVT ppx  -Incentive spirometry  -obtain basic labs    *Gastritis  -pantoprazole

## 2023-03-02 NOTE — DISCHARGE NOTE PROVIDER - NSDCMRMEDTOKEN_GEN_ALL_CORE_FT
Aspirin Enteric Coated 81 mg oral delayed release tablet: 1 tab(s) orally every 12 hours MDD:2 when eliquis completed  CeleBREX 200 mg oral capsule: 1 cap(s) orally every 12 hours  2 hours after aspirin  Eliquis 2.5 mg oral tablet: 1 tab(s) orally every 12 hours after 14 days start asa 81mg po every 12 hours MDD:2  pantoprazole 40 mg oral delayed release tablet: 1 tab(s) orally once a day  Rolling walker with 5 inch wheels: Dx: s/p Right TKR   acetaminophen 500 mg oral tablet: 2 tab(s) orally every 8 hours  Aspirin Enteric Coated 81 mg oral delayed release tablet: 1 tab(s) orally every 12 hours MDD:2 when eliquis completed  CeleBREX 200 mg oral capsule: 1 cap(s) orally every 12 hours  2 hours after aspirin  Colace 100 mg oral capsule: 1 cap(s) orally 2 times a day, As Needed only if constipated  Eliquis 2.5 mg oral tablet: 1 tab(s) orally every 12 hours after 14 days start asa 81mg po every 12 hours MDD:2  fluticasone propionate: 250 milligram(s) inhaled 2 times a day  oxyCODONE 5 mg oral tablet: 1 tab(s) orally every 6 hours, As Needed  for moderate pain; 2 tabs for severe pain. MDD:5  pantoprazole 40 mg oral delayed release tablet: 1 tab(s) orally once a day  polyethylene glycol 3350 oral powder for reconstitution: 17 gram(s) orally once a day (at bedtime)  Rolling walker with 5 inch wheels: Dx: s/p Right TKR  senna leaf extract oral tablet: 2 tab(s) orally once a day (at bedtime)

## 2023-03-03 ENCOUNTER — TRANSCRIPTION ENCOUNTER (OUTPATIENT)
Age: 78
End: 2023-03-03

## 2023-03-03 LAB
ANION GAP SERPL CALC-SCNC: 7 MMOL/L — SIGNIFICANT CHANGE UP (ref 5–17)
BUN SERPL-MCNC: 14 MG/DL — SIGNIFICANT CHANGE UP (ref 7–23)
CALCIUM SERPL-MCNC: 8.3 MG/DL — LOW (ref 8.4–10.5)
CHLORIDE SERPL-SCNC: 104 MMOL/L — SIGNIFICANT CHANGE UP (ref 96–108)
CO2 SERPL-SCNC: 24 MMOL/L — SIGNIFICANT CHANGE UP (ref 22–31)
CREAT SERPL-MCNC: 1.1 MG/DL — SIGNIFICANT CHANGE UP (ref 0.5–1.3)
EGFR: 68 ML/MIN/1.73M2 — SIGNIFICANT CHANGE UP
GLUCOSE SERPL-MCNC: 114 MG/DL — HIGH (ref 70–99)
HCT VFR BLD CALC: 34.2 % — LOW (ref 39–50)
HGB BLD-MCNC: 11.3 G/DL — LOW (ref 13–17)
MCHC RBC-ENTMCNC: 30.6 PG — SIGNIFICANT CHANGE UP (ref 27–34)
MCHC RBC-ENTMCNC: 33 GM/DL — SIGNIFICANT CHANGE UP (ref 32–36)
MCV RBC AUTO: 92.7 FL — SIGNIFICANT CHANGE UP (ref 80–100)
NRBC # BLD: 0 /100 WBCS — SIGNIFICANT CHANGE UP (ref 0–0)
PLATELET # BLD AUTO: 142 K/UL — LOW (ref 150–400)
POTASSIUM SERPL-MCNC: 4.9 MMOL/L — SIGNIFICANT CHANGE UP (ref 3.5–5.3)
POTASSIUM SERPL-SCNC: 4.9 MMOL/L — SIGNIFICANT CHANGE UP (ref 3.5–5.3)
RBC # BLD: 3.69 M/UL — LOW (ref 4.2–5.8)
RBC # FLD: 14.1 % — SIGNIFICANT CHANGE UP (ref 10.3–14.5)
SODIUM SERPL-SCNC: 135 MMOL/L — SIGNIFICANT CHANGE UP (ref 135–145)
WBC # BLD: 17.03 K/UL — HIGH (ref 3.8–10.5)
WBC # FLD AUTO: 17.03 K/UL — HIGH (ref 3.8–10.5)

## 2023-03-03 PROCEDURE — 99232 SBSQ HOSP IP/OBS MODERATE 35: CPT

## 2023-03-03 RX ORDER — OXYCODONE HYDROCHLORIDE 5 MG/1
1 TABLET ORAL
Qty: 35 | Refills: 0
Start: 2023-03-03

## 2023-03-03 RX ORDER — POLYETHYLENE GLYCOL 3350 17 G/17G
17 POWDER, FOR SOLUTION ORAL
Qty: 0 | Refills: 0 | DISCHARGE
Start: 2023-03-03

## 2023-03-03 RX ORDER — ACETAMINOPHEN 500 MG
2 TABLET ORAL
Qty: 0 | Refills: 0 | DISCHARGE
Start: 2023-03-03

## 2023-03-03 RX ORDER — SENNA PLUS 8.6 MG/1
2 TABLET ORAL
Qty: 0 | Refills: 0 | DISCHARGE
Start: 2023-03-03

## 2023-03-03 RX ADMIN — CELECOXIB 200 MILLIGRAM(S): 200 CAPSULE ORAL at 22:17

## 2023-03-03 RX ADMIN — OXYCODONE HYDROCHLORIDE 5 MILLIGRAM(S): 5 TABLET ORAL at 17:52

## 2023-03-03 RX ADMIN — CELECOXIB 200 MILLIGRAM(S): 200 CAPSULE ORAL at 09:15

## 2023-03-03 RX ADMIN — Medication 1000 MILLIGRAM(S): at 13:07

## 2023-03-03 RX ADMIN — OXYCODONE HYDROCHLORIDE 5 MILLIGRAM(S): 5 TABLET ORAL at 22:55

## 2023-03-03 RX ADMIN — SENNA PLUS 2 TABLET(S): 8.6 TABLET ORAL at 22:18

## 2023-03-03 RX ADMIN — PANTOPRAZOLE SODIUM 40 MILLIGRAM(S): 20 TABLET, DELAYED RELEASE ORAL at 06:27

## 2023-03-03 RX ADMIN — APIXABAN 2.5 MILLIGRAM(S): 2.5 TABLET, FILM COATED ORAL at 22:17

## 2023-03-03 RX ADMIN — Medication 1000 MILLIGRAM(S): at 22:18

## 2023-03-03 RX ADMIN — Medication 1000 MILLIGRAM(S): at 13:39

## 2023-03-03 RX ADMIN — Medication 101.6 MILLIGRAM(S): at 06:31

## 2023-03-03 RX ADMIN — OXYCODONE HYDROCHLORIDE 5 MILLIGRAM(S): 5 TABLET ORAL at 22:39

## 2023-03-03 RX ADMIN — CELECOXIB 200 MILLIGRAM(S): 200 CAPSULE ORAL at 09:13

## 2023-03-03 RX ADMIN — Medication 1000 MILLIGRAM(S): at 06:31

## 2023-03-03 RX ADMIN — Medication 1000 MILLIGRAM(S): at 06:27

## 2023-03-03 RX ADMIN — Medication 1000 MILLIGRAM(S): at 22:45

## 2023-03-03 RX ADMIN — CELECOXIB 200 MILLIGRAM(S): 200 CAPSULE ORAL at 22:30

## 2023-03-03 RX ADMIN — POLYETHYLENE GLYCOL 3350 17 GRAM(S): 17 POWDER, FOR SOLUTION ORAL at 22:19

## 2023-03-03 RX ADMIN — APIXABAN 2.5 MILLIGRAM(S): 2.5 TABLET, FILM COATED ORAL at 09:14

## 2023-03-03 NOTE — DISCHARGE NOTE NURSING/CASE MANAGEMENT/SOCIAL WORK - NSDCPEFALRISK_GEN_ALL_CORE
For information on Fall & Injury Prevention, visit: [email or web address] OR  [email or web address] OR  https://www.cdc.gov/steadi/patient.html

## 2023-03-03 NOTE — CARE COORDINATION ASSESSMENT. - NSDCPLANSERVICES_GEN_ALL_CORE
s/p Left TKR 3/2/2023    CM met with patient at bedside.  Patient. A&O x 4. Pt s/p PROCEDURES: lt  total knee arthroplasty 3/2/2023  Pt  resting comfortably offering no complaints at this time.  CM explained role of CM and availability to assist with discharge planning throughout stay.   Provided CM contact information and pt. verbalized understanding. Patient lives in a private home with his wife, 5 steps to enter and 15 steps to second floor. Patient identified his wife  Andrea as his caregiver at home who will assist him during his recuperation and his cousin  will transport him home and to MD appointments.   Pt. is agreeable with PT/OT's recommendations for d/c plan to home with HC/PT.  CM explained home care expectations, process, insurance provisions and home safety with good understanding.   Offered list of [facility] and pt. chose [facility] at home care agency.  Provided discharge resources folder. CM made referral accordingly.  Informed of Anticipated d/c tomorrow 3/4/2023 and for SOC 3/5/2023 Pt verbalizing understanding and in agreement with d/c plans.   DME: [facility] and soren zheng at home.   Provided CM contact information and pt. verbalized understanding.  [facility] will deliver.  no pcp

## 2023-03-03 NOTE — CARE COORDINATION ASSESSMENT. - NSCAREPROVIDERS_GEN_ALL_CORE_FT
CARE PROVIDERS:  Administration: Kirstin Costa  Administration: Shola Mcmahon  Administration: Gomez Bourne  Admitting: Kat Weinstein  Attending: Kat Weinstein  Consultant: Erma Moore  Consultant: Haresh Hernandez  Covering Team: Roberto Marino  Covering Team: Ravi Martinez  Infection Control: Aminata Fernandez  Nurse: Sophia Campbell  Nurse: Arlin Rubi  Nurse: Lisa Sifuentes  Nurse: Tiffani Alvarez  Nurse: Rona Bowles  Override: Rona Bowles  PCA/Nursing Assistant: Nisreen Houston  Physical Therapy: Alma Delia Gamez  Primary Team: Bernice Blake  Primary Team: Anam Henson  Primary Team: Alma Delia Beck  Primary Team: Aaron Antony  : Madelyn Hidalgo  Team: RAMIN MCGARRY Hospitalists, Team

## 2023-03-03 NOTE — PROGRESS NOTE ADULT - ASSESSMENT
77 yo male, pmh of gastritis on PPI intermittently, OA admitted to [facility] for left TKR.     *left knee OA s/p TKR post op day 0  -OT/PT eval  -pain and BM regimen per ortho  -DVT ppx  -Incentive spirometry    *Gastritis  -pantoprazole         79 yo male, pmh of gastritis on PPI intermittently, OA admitted to [facility] for left TKR.     *left knee OA s/p TKR post op day 0  -OT/PT eval  -pain and BM regimen per ortho  -DVT ppx  -Incentive spirometry    *Gastritis  -pantoprazole      leukocytosis  reactive, nof ever

## 2023-03-03 NOTE — CARE COORDINATION ASSESSMENT. - NSPASTMEDSURGHISTORY_GEN_ALL_CORE_FT
PAST MEDICAL & SURGICAL HISTORY:  Hiatal hernia      Dry eye syndrome, bilateral      GERD (gastroesophageal reflux disease)      Osteoarthritis      H/O gastritis      S/P cataract extraction  2010- bilateral      S/P arthroscopy of right knee  1993      S/P arthroscopy of left knee  1993      Osteoarthritis of left knee      COVID-19 vaccine series completed      History of right knee joint replacement      History of COVID-19

## 2023-03-03 NOTE — DISCHARGE NOTE NURSING/CASE MANAGEMENT/SOCIAL WORK - NSSCNAMETXT_GEN_ALL_CORE
[facility] care agency [phone] will reach out to you within 24-72 hours of your discharge to schedule home care visit/eval appointment with you. Please call agency for any queries regarding home care services

## 2023-03-03 NOTE — DISCHARGE NOTE NURSING/CASE MANAGEMENT/SOCIAL WORK - PATIENT PORTAL LINK FT
You can access the FollowMyHealth Patient Portal offered by [facility] by registering at the following website: [email or web address]. By joining [employer]’s FollowMyHealth portal, you will also be able to view your health information using other applications (apps) compatible with our system.

## 2023-03-03 NOTE — PATIENT CHOICE NOTE. - NSPTCHOICESTATE_GEN_ALL_CORE

## 2023-03-04 VITALS
HEART RATE: 58 BPM | DIASTOLIC BLOOD PRESSURE: 72 MMHG | TEMPERATURE: 98 F | RESPIRATION RATE: 16 BRPM | SYSTOLIC BLOOD PRESSURE: 128 MMHG | OXYGEN SATURATION: 97 %

## 2023-03-04 LAB
HCT VFR BLD CALC: 32.5 % — LOW (ref 39–50)
HGB BLD-MCNC: 10.5 G/DL — LOW (ref 13–17)
MCHC RBC-ENTMCNC: 29.8 PG — SIGNIFICANT CHANGE UP (ref 27–34)
MCHC RBC-ENTMCNC: 32.3 GM/DL — SIGNIFICANT CHANGE UP (ref 32–36)
MCV RBC AUTO: 92.3 FL — SIGNIFICANT CHANGE UP (ref 80–100)
NRBC # BLD: 0 /100 WBCS — SIGNIFICANT CHANGE UP (ref 0–0)
PLATELET # BLD AUTO: 142 K/UL — LOW (ref 150–400)
RBC # BLD: 3.52 M/UL — LOW (ref 4.2–5.8)
RBC # FLD: 14.3 % — SIGNIFICANT CHANGE UP (ref 10.3–14.5)
WBC # BLD: 13.64 K/UL — HIGH (ref 3.8–10.5)
WBC # FLD AUTO: 13.64 K/UL — HIGH (ref 3.8–10.5)

## 2023-03-04 PROCEDURE — 73560 X-RAY EXAM OF KNEE 1 OR 2: CPT

## 2023-03-04 PROCEDURE — 36415 COLL VENOUS BLD VENIPUNCTURE: CPT

## 2023-03-04 PROCEDURE — 97530 THERAPEUTIC ACTIVITIES: CPT

## 2023-03-04 PROCEDURE — 97116 GAIT TRAINING THERAPY: CPT

## 2023-03-04 PROCEDURE — 99232 SBSQ HOSP IP/OBS MODERATE 35: CPT

## 2023-03-04 PROCEDURE — 86850 RBC ANTIBODY SCREEN: CPT

## 2023-03-04 PROCEDURE — 86901 BLOOD TYPING SEROLOGIC RH(D): CPT

## 2023-03-04 PROCEDURE — C1713: CPT

## 2023-03-04 PROCEDURE — 94664 DEMO&/EVAL PT USE INHALER: CPT

## 2023-03-04 PROCEDURE — 97165 OT EVAL LOW COMPLEX 30 MIN: CPT

## 2023-03-04 PROCEDURE — 86900 BLOOD TYPING SEROLOGIC ABO: CPT

## 2023-03-04 PROCEDURE — 97110 THERAPEUTIC EXERCISES: CPT

## 2023-03-04 PROCEDURE — 97535 SELF CARE MNGMENT TRAINING: CPT

## 2023-03-04 PROCEDURE — 97161 PT EVAL LOW COMPLEX 20 MIN: CPT

## 2023-03-04 PROCEDURE — 80048 BASIC METABOLIC PNL TOTAL CA: CPT

## 2023-03-04 PROCEDURE — 85027 COMPLETE CBC AUTOMATED: CPT

## 2023-03-04 PROCEDURE — C1889: CPT

## 2023-03-04 PROCEDURE — C1776: CPT

## 2023-03-04 RX ADMIN — PANTOPRAZOLE SODIUM 40 MILLIGRAM(S): 20 TABLET, DELAYED RELEASE ORAL at 05:41

## 2023-03-04 RX ADMIN — Medication 1000 MILLIGRAM(S): at 14:18

## 2023-03-04 RX ADMIN — CELECOXIB 200 MILLIGRAM(S): 200 CAPSULE ORAL at 08:48

## 2023-03-04 RX ADMIN — Medication 1000 MILLIGRAM(S): at 05:41

## 2023-03-04 RX ADMIN — Medication 1000 MILLIGRAM(S): at 13:33

## 2023-03-04 RX ADMIN — CELECOXIB 200 MILLIGRAM(S): 200 CAPSULE ORAL at 09:42

## 2023-03-04 RX ADMIN — Medication 1000 MILLIGRAM(S): at 06:10

## 2023-03-04 RX ADMIN — APIXABAN 2.5 MILLIGRAM(S): 2.5 TABLET, FILM COATED ORAL at 08:48

## 2023-03-04 NOTE — PROGRESS NOTE ADULT - SUBJECTIVE AND OBJECTIVE BOX
Patient is a 78y old  Male who presents with a chief complaint of knee pain (03 Mar 2023 11:29)      SUBJECTIVE / OVERNIGHT EVENTS: pt seen and examined. no fever, no shob, NAD, no c/o pain says its contrable feels better        Vital Signs Last 24 Hrs  T(C): 36.7 (04 Mar 2023 07:53), Max: 36.7 (04 Mar 2023 07:53)  T(F): 98 (04 Mar 2023 07:53), Max: 98 (04 Mar 2023 07:53)  HR: 58 (04 Mar 2023 07:53) (58 - 60)  BP: 128/72 (04 Mar 2023 07:53) (128/70 - 132/73)  BP(mean): --  RR: 16 (04 Mar 2023 07:53) (16 - 16)  SpO2: 97% (04 Mar 2023 07:53) (97% - 99%)    Parameters below as of 04 Mar 2023 07:53  Patient On (Oxygen Delivery Method): room air      I&O's Summary    03 Mar 2023 07:01  -  04 Mar 2023 07:00  --------------------------------------------------------  IN: 0 mL / OUT: 400 mL / NET: -400 mL        PHYSICAL EXAM:  GENERAL: NAD  HEAD:  Atraumatic, Normocephalic  NECK: Supple  CHEST/LUNG: CTABL  HEART: S1+S2  ABDOMEN: Soft, Nontender, Nondistended; Bowel sounds present  Neuro: no focal deficit  EXTREMITIES:  No edema  SKIN: No rashes or lesions    LABS:                        10.5   13.64 )-----------( 142      ( 04 Mar 2023 06:33 )             32.5     03-03    135  |  104  |  14  ----------------------------<  114<H>  4.9   |  24  |  1.10    Ca    8.3<L>      03 Mar 2023 06:30        CAPILLARY BLOOD GLUCOSE                RADIOLOGY & ADDITIONAL TESTS:    Imaging Personally Reviewed:  [x] YES  [ ] NO    Consultant(s) Notes Reviewed:  [x] YES  [ ] NO      MEDICATIONS  (STANDING):  acetaminophen     Tablet .. 1000 milliGRAM(s) Oral every 8 hours  apixaban 2.5 milliGRAM(s) Oral two times a day  budesonide 160 MICROgram(s)/formoterol 4.5 MICROgram(s) Inhaler 2 Puff(s) Inhalation two times a day  celecoxib 200 milliGRAM(s) Oral every 12 hours  HYDROmorphone  Injectable 0.5 milliGRAM(s) IV Push once  lactated ringers. 1000 milliLiter(s) (100 mL/Hr) IV Continuous <Continuous>  pantoprazole    Tablet 40 milliGRAM(s) Oral before breakfast  polyethylene glycol 3350 17 Gram(s) Oral at bedtime  senna 2 Tablet(s) Oral at bedtime    MEDICATIONS  (PRN):  benzocaine/menthol Lozenge 1 Lozenge Oral every 6 hours PRN Sore Throat  magnesium hydroxide Suspension 30 milliLiter(s) Oral daily PRN Constipation  ondansetron Injectable 4 milliGRAM(s) IV Push every 6 hours PRN Nausea and/or Vomiting  oxyCODONE    IR 5 milliGRAM(s) Oral every 3 hours PRN Moderate Pain (4 - 6)  oxyCODONE    IR 10 milliGRAM(s) Oral every 3 hours PRN Severe Pain (7 - 10)      Care Discussed with Consultants/Other Providers [x] YES  [ ] NO    HEALTH ISSUES - PROBLEM Dx:  Primary osteoarthritis of left knee        
Post Op     GRUPO LIVINGSTON      78y        Male                                                                                                                 T(C): 36.7 (03-04-23 @ 07:53), Max: 36.7 (03-04-23 @ 07:53)  HR: 58 (03-04-23 @ 07:53) (58 - 60)  BP: 128/72 (03-04-23 @ 07:53) (128/70 - 132/73)  RR: 16 (03-04-23 @ 07:53) (16 - 16)  SpO2: 97% (03-04-23 @ 07:53) (97% - 99%)  Wt(kg): --    S/P  total knee replacement    Patient denies shortness of breath, chest pain, dyspnea, No complaints  Pain is 3/10    Physical Exam    Extremity: Bilaterally:  No holmon                                           No Cord                                          PAS on b./l                                           Neurovascular intact                                          Motor intact EHL/FHL                                          Sensation intact                                          Pulses intact DP/PT                                         Calves Soft                                         Dressing Clean / Dry / Intact silverlon                                         Capillary refill with 5 seconds                          10.5   13.64 )-----------( 142      ( 04 Mar 2023 06:33 )             32.5       03-03    135  |  104  |  14  ----------------------------<  114<H>  4.9   |  24  |  1.10    Ca    8.3<L>      03 Mar 2023 06:30        A/P  -- S/P total knee replacement    -  Medicine To Follow   - DVT prophylaxis PAS eliquis  - PT & OT   - Analagesia  - Incentive Spirometry  - Discharge Planning  - Safety Precautions  -  CBC , BMP daily    
Discharge medication calendar:  Eliquis 2.5mg q12h x 2 weeks then ASA EC 81mg q12h x 2 weeks  Celecoxib 200mg q12h x 2-3 weeks  Pantoprazole 40mg QAM x 4 weeks  Narcotic PRN  Docusate 100mg TID while taking narcotic  Miralax, Senna, or Bisacodyl PRN for treatment of constipation  
Ortho PA Post Op Check    Procedure: LEft TKR  Surgeon: Corinna    SUBJECTIVE:  Pt comfortable without complaints, pain controlled 3-4/10 on interval Rx; Denies CP, SOB, N/V, numbness/tingling   Tolerated PO fluids well.  Stood and walked with PT earlier with walker into hallway.  Pain Rx:  acetaminophen     Tablet .. 1000 milliGRAM(s) Oral every 8 hours  HYDROmorphone  Injectable 0.5 milliGRAM(s) IV Push once  ondansetron Injectable 4 milliGRAM(s) IV Push every 6 hours PRN  oxyCODONE    IR 5 milliGRAM(s) Oral every 3 hours PRN  oxyCODONE    IR 10 milliGRAM(s) Oral every 3 hours PRN      OBJECTIVE:  General Exam:  Vital Signs Last 24 Hrs  T(C): 36.4 (03-02-23 @ 15:00), Max: 36.4 (03-02-23 @ 12:22)  T(F): 97.5 (03-02-23 @ 15:00), Max: 97.5 (03-02-23 @ 12:22)  HR: 70 (03-02-23 @ 15:00) (70 - 77)  BP: 123/69 (03-02-23 @ 15:00) (118/63 - 123/69)  RR: 16 (03-02-23 @ 15:00) (14 - 16)  SpO2: 99% (03-02-23 @ 15:00) (99% - 100%)    General: Pt Alert and oriented, NAD, controlled pain.  Ext(Knee): Left Knee Goodman Dressing clean, dry, & intact; Not constricting at Proximal & Distal ends;  Abd: soft, non tender, no pelvic mass  ROM: Extension 0 deg. Flexion  60 deg [ PROM ] in bed.  Neuro/Vasc: Feet toes warm, pink. DP = 2+. No calf tenderness bilat.. Has sensation over feet & toes bilat. Full AROM bilat feet & toes. EHL = 5/5  Urine Out [11P-7A] = awaiting void ml; HVAC = N/A ml    VTEP: On Venodynes Bilat;  BID Eliquis to start in AM    Hospitalist input today noted.    A/P: 78yMale POD#0 s/p Left TKR  - Stable from Orthopedic & Surgical standpoint; Neuromotor exam of LE stable  - Pain Control stable  - DVT ppx: ordered  - Post op abx: ordered  - Post Op Labs pending draw; Ortho team/Hospitalist to F/U  - PT eval Done  - Weight bearing status: WBAT
Procedure:  Left TKR  POD #: 1  S: Pt without complaints. No SOB,CP, N/V. Tolerated Diet well.  Knee Pain comfortable (3/10 ) on  Interval Rx.   No BM yet, + flatus, No abdominal pain. Voiding well.  PT activity yesterday: Stood & stepped / Walked with walker  Pain Rx:  acetaminophen     Tablet .. 1000 milliGRAM(s) Oral every 8 hours  celecoxib 200 milliGRAM(s) Oral every 12 hours  HYDROmorphone  Injectable 0.5 milliGRAM(s) IV Push once  ondansetron Injectable 4 milliGRAM(s) IV Push every 6 hours PRN  oxyCODONE    IR 5 milliGRAM(s) Oral every 3 hours PRN  oxyCODONE    IR 10 milliGRAM(s) Oral every 3 hours PRN    O: General: On exam, No Apparent Distress  Vital Signs Last 24 Hrs  T(C): 36.6 (03 Mar 2023 08:18), Max: 36.7 (02 Mar 2023 23:30)  T(F): 97.8 (03 Mar 2023 08:18), Max: 98 (02 Mar 2023 23:30)  HR: 56 (03 Mar 2023 08:18) (56 - 77)  BP: 177/61 (03 Mar 2023 08:18) (107/48 - 177/61)  RR: 16 (03 Mar 2023 08:18) (10 - 18)  SpO2: 98% (03 Mar 2023 08:18) (97% - 100%)    [Abdomen]: + BS, soft , benign exam. Pelvic mass  Ext(Knee): Left Knee [ Goodman ] Dressing; No  cellulitis; MIn effusion [ soft ]. Minimal soft tissue swelling knee/thigh;  No fluctuance, No crepitus].   ROM: Extension 0 deg. ; Flexion 80 deg. PROM in bed   Neurologic:  Has sensation over feet & toes bilat. Full AROM bilat feet & toes. EHL/AT = 5/5  Vascular: Feet toes warm, pink. DP = 2+. No calf tenderness bilat..  Urine Out [11P-7A] = Voided ml; HVAC = N/A ml    VTEP: On Bilat. Venodynes + apixaban 2.5 milliGRAM(s) Oral two times a day    Labs yesterday noted. [ CBC, Chem stable]    Hospitalist input noted.    Labs Today:   CBC:                      11.3   17.03 )-----------( 142      ( 03 Mar 2023 06:30 )             34.2     03-03  Chem:  135  |  104  |  14  ----------------------------<  114<H>  4.9   |  24  |  1.10      Primary Orthopedic Assessment:  • Stable from Orthopedic perspective  • Neuro motor exam stable  • Labs: CBC / Chem stable      Plan:   • Continue:  PT/OT/WBAT with assistance of a walker/Ice to knee/ Knee ROM / Incentive spirometry encouraged   • Continue DVT prophylaxis as prescribed, including use of compression devices and ankle pumps  • Continue Pain Rx  • Plans per Medicine / Anesthesia   • Discharge planning – anticipated discharge is Home D/C with Home care & Home PT  when medically stable & cleared by PT/OT  
Patient is a 78y old  Male who presents with a chief complaint of S?P LEft TKR 3/2 (03 Mar 2023 10:20)      SUBJECTIVE / OVERNIGHT EVENTS: pt seen and examined. no fever, no shob, NAD, no c/o pain  sya sthat he wants to saty as he manzano snot feel stable while walking        Vital Signs Last 24 Hrs  T(C): 36.6 (03 Mar 2023 08:18), Max: 36.7 (02 Mar 2023 23:30)  T(F): 97.8 (03 Mar 2023 08:18), Max: 98 (02 Mar 2023 23:30)  HR: 56 (03 Mar 2023 08:18) (56 - 77)  BP: 177/61 (03 Mar 2023 08:18) (107/48 - 177/61)  BP(mean): --  RR: 16 (03 Mar 2023 08:18) (14 - 16)  SpO2: 98% (03 Mar 2023 08:18) (97% - 100%)    Parameters below as of 03 Mar 2023 08:18  Patient On (Oxygen Delivery Method): room air      I&O's Summary    02 Mar 2023 07:01  -  03 Mar 2023 07:00  --------------------------------------------------------  IN: 2775 mL / OUT: 675 mL / NET: 2100 mL        PHYSICAL EXAM:  GENERAL: NAD  HEAD:  Atraumatic, Normocephalic  NECK: Supple  CHEST/LUNG: CTABL  HEART: S1+S2  ABDOMEN: Soft, Nontender, Nondistended; Bowel sounds present  Neuro: no focal deficit  EXTREMITIES:  No edema  SKIN: No rashes or lesions    LABS:                        11.3   17.03 )-----------( 142      ( 03 Mar 2023 06:30 )             34.2     03-03    135  |  104  |  14  ----------------------------<  114<H>  4.9   |  24  |  1.10    Ca    8.3<L>      03 Mar 2023 06:30        CAPILLARY BLOOD GLUCOSE                RADIOLOGY & ADDITIONAL TESTS:    Imaging Personally Reviewed:  [x] YES  [ ] NO    Consultant(s) Notes Reviewed:  [x] YES  [ ] NO      MEDICATIONS  (STANDING):  acetaminophen     Tablet .. 1000 milliGRAM(s) Oral every 8 hours  apixaban 2.5 milliGRAM(s) Oral two times a day  budesonide 160 MICROgram(s)/formoterol 4.5 MICROgram(s) Inhaler 2 Puff(s) Inhalation two times a day  celecoxib 200 milliGRAM(s) Oral every 12 hours  HYDROmorphone  Injectable 0.5 milliGRAM(s) IV Push once  lactated ringers. 1000 milliLiter(s) (100 mL/Hr) IV Continuous <Continuous>  pantoprazole    Tablet 40 milliGRAM(s) Oral before breakfast  polyethylene glycol 3350 17 Gram(s) Oral at bedtime  senna 2 Tablet(s) Oral at bedtime    MEDICATIONS  (PRN):  benzocaine/menthol Lozenge 1 Lozenge Oral every 6 hours PRN Sore Throat  magnesium hydroxide Suspension 30 milliLiter(s) Oral daily PRN Constipation  ondansetron Injectable 4 milliGRAM(s) IV Push every 6 hours PRN Nausea and/or Vomiting  oxyCODONE    IR 5 milliGRAM(s) Oral every 3 hours PRN Moderate Pain (4 - 6)  oxyCODONE    IR 10 milliGRAM(s) Oral every 3 hours PRN Severe Pain (7 - 10)      Care Discussed with Consultants/Other Providers [x] YES  [ ] NO    HEALTH ISSUES - PROBLEM Dx:  Primary osteoarthritis of left knee

## 2023-03-04 NOTE — PROGRESS NOTE ADULT - ASSESSMENT
79 yo male, pmh of gastritis on PPI intermittently, OA admitted to [facility] for left TKR.     *left knee OA s/p TKR post op day 0  -OT/PT eval  -pain and BM regimen per ortho  -DVT ppx  -Incentive spirometry    *Gastritis  -pantoprazole      leukocytosis  reactive, no fever    anemia  likely post op - stable

## 2023-03-04 NOTE — CASE MANAGEMENT PROGRESS NOTE - NSCMPROGRESSNOTE_GEN_ALL_CORE
Pt was cleared for transition home today with [facility] with [facility] on 3/5/23. Pt has RW and commode in the home and the pt's wife will help the pt to recover in the home. The pt's cousin will transport the pt home today. Pt was able to verbalize the home care process. The bedside RN is aware of the transition home plan. CM to remain available for post acute needs.

## 2023-03-05 ENCOUNTER — TRANSCRIPTION ENCOUNTER (OUTPATIENT)
Age: 78
End: 2023-03-05

## 2023-03-17 ENCOUNTER — APPOINTMENT (OUTPATIENT)
Dept: ORTHOPEDIC SURGERY | Facility: CLINIC | Age: 78
End: 2023-03-17
Payer: MEDICARE

## 2023-03-17 VITALS — HEART RATE: 90 BPM | TEMPERATURE: 98.8 F | DIASTOLIC BLOOD PRESSURE: 91 MMHG | SYSTOLIC BLOOD PRESSURE: 147 MMHG

## 2023-03-17 DIAGNOSIS — Z96.652 PRESENCE OF LEFT ARTIFICIAL KNEE JOINT: ICD-10-CM

## 2023-03-17 PROCEDURE — 73562 X-RAY EXAM OF KNEE 3: CPT | Mod: LT

## 2023-03-17 PROCEDURE — 99024 POSTOP FOLLOW-UP VISIT: CPT

## 2023-03-17 RX ORDER — AMOXICILLIN 500 MG/1
500 CAPSULE ORAL
Refills: 0 | Status: ACTIVE | COMMUNITY

## 2023-03-17 RX ORDER — CELECOXIB 200 MG/1
200 CAPSULE ORAL
Refills: 0 | Status: ACTIVE | COMMUNITY

## 2023-03-17 RX ORDER — ACETAMINOPHEN 500 MG/1
500 TABLET ORAL
Refills: 0 | Status: ACTIVE | COMMUNITY

## 2023-03-27 ENCOUNTER — TRANSCRIPTION ENCOUNTER (OUTPATIENT)
Age: 78
End: 2023-03-27

## 2023-03-27 ENCOUNTER — APPOINTMENT (OUTPATIENT)
Dept: PULMONOLOGY | Facility: CLINIC | Age: 78
End: 2023-03-27
Payer: MEDICARE

## 2023-03-27 ENCOUNTER — NON-APPOINTMENT (OUTPATIENT)
Age: 78
End: 2023-03-27

## 2023-03-27 VITALS
TEMPERATURE: 98.2 F | RESPIRATION RATE: 16 BRPM | DIASTOLIC BLOOD PRESSURE: 80 MMHG | BODY MASS INDEX: 28.41 KG/M2 | SYSTOLIC BLOOD PRESSURE: 140 MMHG | HEIGHT: 67 IN | WEIGHT: 181 LBS | HEART RATE: 89 BPM | OXYGEN SATURATION: 96 %

## 2023-03-27 DIAGNOSIS — E55.9 VITAMIN D DEFICIENCY, UNSPECIFIED: ICD-10-CM

## 2023-03-27 DIAGNOSIS — U07.1 COVID-19: ICD-10-CM

## 2023-03-27 PROCEDURE — 95012 NITRIC OXIDE EXP GAS DETER: CPT

## 2023-03-27 PROCEDURE — 94010 BREATHING CAPACITY TEST: CPT

## 2023-03-27 PROCEDURE — 99214 OFFICE O/P EST MOD 30 MIN: CPT | Mod: CS,25

## 2023-03-27 RX ORDER — AZITHROMYCIN 250 MG/1
250 TABLET, FILM COATED ORAL
Qty: 1 | Refills: 0 | Status: DISCONTINUED | COMMUNITY
Start: 2022-07-27 | End: 2023-03-27

## 2023-03-27 NOTE — ADDENDUM
[FreeTextEntry1] : Documented by Tarah Russo acting as a scribe for Dr. Eron Johnson on 03/27/2023 \par \par All medical record entries made by the Scribe were at my, Dr. Eron Johnson's, direction and personally dictated by me on 03/27/2023 . I have reviewed the chart and agree that the record accurately reflects my personal performance of the history, physical exam, assessment and plan. I have also personally directed, reviewed, and agree with the discharge instructions.

## 2023-03-27 NOTE — PROCEDURE
[FreeTextEntry1] : \par PFT reveals mild flows, with an FEV1 of   1.52L, which is  70% of predicted, with normal flow volume loop \par \par FENO was 15; normal value being less than 25\par Fractional exhaled nitric oxide (FENO) is regarded as a simple, noninvasive method for assessing eosinophilic airway inflammation. Produced by a variety of cells within the lung, nitric oxide (NO) concentrations are generally low in healthy individuals. However, high concentrations of NO appear to be involved in nonspecific host defense mechanisms and chronic inflammatory diseases such as asthma. The American Thoracic Society (ATS) therefore has recommended using FENO to aid in the diagnosis and monitoring of eosinophilic airway inflammation and asthma, and for identifying steroid responsive individuals whose chronic respiratory symptoms may be airway inflammation.

## 2023-03-27 NOTE — HISTORY OF PRESENT ILLNESS
[FreeTextEntry1] : Mr. Rogers is a 78 year old male with a history of asthma, GERD, SOB, and vitamin D deficiency presenting today for a follow up pulmonary evaluation. His chief complaint is \par - he notes he has been stretching everyday \par - he notes sometimes he gets a pain around in his chest, hes not sure if its due to gas\par - he notes his bowels are very regular\par - he notes he feels bloated sometimes, generally after lunch. He notes he may be eating fast. \par - he notes his weight is stable \par - he notes he denies any hoarseness\par - he notes he sleeps on his back \par - he notes his energy level is about 8/10 \par - he has been taking pantoprazole \par - he notes he's not cleared to go in the pool yet \par - he will be traveling down to [place] on the 18th. \par - he notes he had COVID awhile back but no issues right now. \par - he notes his sinuses have been controlled. \par -He denies any visual issues, headaches, nausea, vomiting, fever, chills, sweats, chest pains, chest pressure, diarrhea, constipation, dysphagia, myalgia, dizziness, leg swelling, leg pain, itchy eyes, itchy ears, heartburn, reflux, or sour taste in the mouth.

## 2023-03-27 NOTE — ASSESSMENT
[FreeTextEntry1] : Mr. LIVINGSTON is a 78 year old male with a history of nonsmoker, benign familial tremor, BPH, chronic sinus issues, GERD, OA, low vitamin D, lumbar disc disease who now comes to the office for a follow up pulmonary evaluation for asthma, allergy, and GERD.- noncompliant - #1 issue healing of (L) of knee \par \par His shortness of breath is multifactorial due to:\par -poor mechanics of breathing \par -out of shape / overweight\par -pulmonary disease\par      -Severe persistent asthma\par -cardiac disease \par \par problem 1: Severe Persistent Asthma (noncompliant)- semi controlled \par -s/p Prednisone 20 mg x 7 days, 10 mg x 7 days 12/2021\par Information sheet given to the patient to be reviewed, this medication is never to be used without consulting the prescribing physician. Proper dietary restraint is necessary specifically salt containing foods, if any reaction may occur should be reported. \par -continue Singulair 10 mg QHS (NC)\par -continue Symbicort (160) 2 inhalations BID (generic) (NC) = (from Mary)\par -continue Ventolin 2 puffs Q6H, pre-exercise\par - Asthma is believed to be caused by inherited (genetic) and environmental factor, but its exact cause is unknown. Asthma may be triggered by allergens, lung infections, or irritants in the air. Asthma triggers are different for each person \par \par Problem 1A: Elevated IgE\par -Patient refused Xolair \par -Xolair is a recombinant DNA- derived humanized IgG1K monoclonal antibody that selectively binds to human immunoglobulin E (IgE). Xolair is produced by a Chinese hamster ovary cell suspension culture in nutrient medium containing the antibiotic gentamicin. Gentamicin is not detectable in the final product. Xolair is a sterile, white, preservative free, lyophilized powder contained in a single use vial that is reconstituted with sterile water for suspension. Side effects include: wheezing, tightness of the chest, trouble breathing, hives, skin rash, feeling anxious or light-headed, fainting, warmth or tingling under skin, or swelling of face, lips, or tongue \par \par Problem 2: allergies/sinus- active\par - continue Flonase 1 sniff/nostril BID/ - Astelin 0.15% 1 sniff/nostril BID  = Dymista 1 BID\par -continue Clarinex 5 mg qHS \par - continue Singulair 10 mg QHS\par -continue Atrovent 0.6% at 1 sniff/nostril, up to TID \par - Recommended Navage\par - Continue Clarinex 5 mg before bed \par - s/p Blood work to include: asthma panel (+), food IgE panel (+), IgE level (+: 117), eosinophil level (wnl), vitamin D level (wnl)\par - Environmental measures for allergies were encouraged including mattress and pillow covers, air purifier, and environmental controls.\par \par Problem 3: GERD (Homeopathic treatment) - Constantino (controlled)\par - transition from Protonix 40 mg QAM, pre-breakfast to Pepcid 40 mg QHS\par - Recommended organic apple cider vinegar tablets \par -Kefir yogurt, hold on supplements. \par -Rule of 2s: avoid eating too much, eating too late, eating too spicy, eating two hours before bed.\par -Things to avoid including overeating, spicy foods, tight clothing, eating within three hours of bed, this list is not all inclusive. \par -For treatment of reflux, possible options discussed including diet control, H2 blockers, PPIs, as well as coating motility agents discussed as treatment options. Timing of meals and proximity of last meal to sleep were discussed. If symptoms persist, a formal gastrointestinal evaluation is needed. \par \par problem 4: cardiac disease\par -recommended to continue to follow up with Cardiologist (Aarti Shaikh)\par \par problem 5: poor breathing mechanics\par - Recommended Wim Hof and Buteyko techniques \par -Proper breathing techniques were reviewed with an emphasis of exhalation. Patient instructed to breath in for 1 second and out for four seconds. Patient was encouraged to not talk while walking. \par \par problem 6: out of shape / overweight\par -Recommended Alberto Garrett's 10-day detox diet and book. \par -Weight loss, exercise, and diet control were discussed and are highly encouraged. Treatment options were given such as, aqua therapy, and contacting a nutritionist. Recommended to use the elliptical, stationary bike, less use of treadmill. Mindful eating was explained to the patient Obesity is associated with worsening asthma, shortness of breath, and potential for cardiac disease, diabetes, and other underlying medical conditions\par \par Problem 7a: COVID-19 precautionary Immune Support Recommendations: s/p Moderna vaccine x3 \par OTC Vitamin C 500mg BID \par -OTC Quercetin 250-500mg BID \par -OTC Zinc 75-100mg per day \par -OTC Melatonin 1 or 2mg a night \par -OTC Vitamin D 1-4000mg per day \par -OTC Tonic Water 8oz per day\par -Water 0.5-1 gallon per day\par \par problem 7: health maintenance \par - s/p COVID-19 vax x3 \par -Recommended not to get an additional COVID-19 booster at this time until it is updated against the current variants. If planning on travelling, obtaining another booster within 2 weeks of departure is recommended. \par - Discussed and recommended to wait for the updated booster \par -recommended yearly flu shot - denied \par -recommended strep pneumonia vaccines: Prevnar-13 vaccine (2019), followed by Pneumo vaccine 23 one year following (9/2020)\par -recommended early intervention for Upper Respiratory Infections (URIs)\par -recommended regular osteoporosis evaluations\par -recommended early dermatological evaluations\par -recommended after the age of 50 to the age of 70, colonoscopy every 5 years\par \par F/U in 6-8 weeks.\par He is encouraged to call with any changes, concerns, or questions.

## 2023-03-28 ENCOUNTER — TRANSCRIPTION ENCOUNTER (OUTPATIENT)
Age: 78
End: 2023-03-28

## 2023-04-04 ENCOUNTER — TRANSCRIPTION ENCOUNTER (OUTPATIENT)
Age: 78
End: 2023-04-04

## 2023-04-11 ENCOUNTER — APPOINTMENT (OUTPATIENT)
Dept: ORTHOPEDIC SURGERY | Facility: CLINIC | Age: 78
End: 2023-04-11

## 2023-04-11 ENCOUNTER — APPOINTMENT (OUTPATIENT)
Dept: ORTHOPEDIC SURGERY | Facility: CLINIC | Age: 78
End: 2023-04-11
Payer: MEDICARE

## 2023-04-11 VITALS — SYSTOLIC BLOOD PRESSURE: 117 MMHG | DIASTOLIC BLOOD PRESSURE: 78 MMHG | HEART RATE: 82 BPM

## 2023-04-11 DIAGNOSIS — M25.569 PAIN IN UNSPECIFIED KNEE: ICD-10-CM

## 2023-04-11 DIAGNOSIS — Z96.652 PRESENCE OF LEFT ARTIFICIAL KNEE JOINT: ICD-10-CM

## 2023-04-11 PROCEDURE — 73562 X-RAY EXAM OF KNEE 3: CPT | Mod: 50

## 2023-04-11 PROCEDURE — 99024 POSTOP FOLLOW-UP VISIT: CPT

## 2023-04-11 NOTE — HISTORY OF PRESENT ILLNESS
[de-identified] : Patient being seen approximately 5 and half weeks from left total knee replacement surgery for a quick check as the patient is going to [place] for approximately 4 to 6 weeks [Improving] : improving [5] : a current pain level of 5/10 [Walking] : walking [Standing] : standing [de-identified] : Patient is doing significant exercise walking 3 miles a day reciprocating stairs [de-identified] : Rest ice and elevation are helpful

## 2023-04-11 NOTE — PHYSICAL EXAM
[Normal] : Gait: normal [LE] : Sensory: Intact in bilateral lower extremities [DP] : dorsalis pedis 2+ and symmetric bilaterally [PT] : posterior tibial 2+ and symmetric bilaterally [Normal RLE] : Right Lower Extremity: No scars, rashes, lesions, ulcers, skin intact [Normal LLE] : Left Lower Extremity: No scars, rashes, lesions, ulcers, skin intact [Normal Touch] : sensation intact for touch [de-identified] : The wound is well-healed, neurovascular status is intact. Good sensation and pulses distally good strength against resistance and EHL and dorsiflexion.\par There is no anterior drawer. There is no extensor lag or AP laxity. There is no significant mediolateral instability. Extension is the midline. Calf is nontender\par The range of motion is 0-135\par  [de-identified] : Radiographs of the LT knee were performed today. There are stable interfaces between bone, cement, and implant in all 3 components. There is stable positioning of the femoral, tibial, and patellar components. There is equidistant spacing on each side of the tibial bearing. There is no fracture, foreign body, subsidence, osteolysis, or notable effusion. The patellar component is tracking centrally in the trochlear groove of the femoral component.\par \par

## 2023-04-11 NOTE — DISCUSSION/SUMMARY
[de-identified] : Patient was advised that the very minimal swelling that he has is perfectly normal that the x-rays are mirror images the right and the left knee.  He was advised on activities in [place] and was advised to rest when he feels discomfort in the knee.  He uses little or no medication and is doing much more activity than normal postoperative patient

## 2023-05-03 ENCOUNTER — TRANSCRIPTION ENCOUNTER (OUTPATIENT)
Age: 78
End: 2023-05-03

## 2023-05-16 ENCOUNTER — APPOINTMENT (OUTPATIENT)
Dept: ORTHOPEDIC SURGERY | Facility: CLINIC | Age: 78
End: 2023-05-16
Payer: MEDICARE

## 2023-05-16 VITALS — HEART RATE: 64 BPM | DIASTOLIC BLOOD PRESSURE: 77 MMHG | SYSTOLIC BLOOD PRESSURE: 123 MMHG

## 2023-05-16 PROCEDURE — 73562 X-RAY EXAM OF KNEE 3: CPT | Mod: LT

## 2023-05-16 PROCEDURE — 99213 OFFICE O/P EST LOW 20 MIN: CPT | Mod: 24

## 2023-05-16 NOTE — END OF VISIT
[FreeTextEntry3] : I, Dr. Kat Weinstein MD, personally performed the evaluation and management services for this established patient who presented today with a new problem/exacerbation of an existing condition. That E/M includes conducting the examination, assessing all new/exacerbated conditions, and establishing a new plan of care. Today, MY ACP was here to assist with recording the history in my evaluation and management services of this new problem/exacerbated condition to be followed going forward.\par

## 2023-05-16 NOTE — HISTORY OF PRESENT ILLNESS
[___ Months Post Op] : [unfilled] months post op [5] : the patient reports pain that is 5/10 in severity [Doing Well] : is doing well [Clean/Dry/Intact] : clean, dry and intact [Healed] : healed [Excellent Pain Control] : has excellent pain control [No Sign of Infection] : is showing no signs of infection [Chills] : no chills [Constipation] : no constipation [Diarrhea] : no diarrhea [Dysuria] : no dysuria [Fever] : no fever [Nausea] : no nausea [Vomiting] : no vomiting [Erythema] : not erythematous [Discharge] : absent of discharge [Swelling] : not swollen [Dehiscence] : not dehisced [de-identified] : S/P Left TKR DOS 3/2/23 [de-identified] : Patient is a 78-year-old male who presents today for an evaluation regarding his left knee.  He is status post left total knee replacement on March 2, 2023.  Overall he is states that he is doing fairly well.  But does have some discomfort within his knee.  He states that it is noticed more so on the anterior lateral aspect.  And it may be up to a 5 on a scale of 1-10.  He does states that he leads a very active life.  Which includes hiking.  And is able to slowly return back to his activities.  However with the use of prolonged walking or exercising creates an little increase in discomfort.  He also states that he has been receiving physical therapy 3 times a week. [de-identified] : On physical examination of the left knee.  Patient is status post left total knee replacement.  There is a well-healed surgical scar with no evidence of infection superficial or deep.  There is no redness, swelling, heat, discharge or ecchymosis noted.  There is no pain or tenderness on examination.  The patient has good range of motion both passively and actively. As they are able to fully extend the knee with good flexion.  Patient also has good strength with range of motion against resistance.  There is no evidence of ligamentous laxity.  As this was tested in anterior posterior drawer test as well as varus and valgus stress testing.  There is no evidence of muscle atrophy or palpable defect.  No evidence of any motor or sensory deficit.  Patient has good distal pulses with no calf tenderness. Pt has excellent extension; as the pt is able to fully extend the knee. The pt has excellent range of motion; as the patient is able to fully flex the knee to about 125+ degrees.\par  [de-identified] : X-rays of the left knee reveals a status post left total knee replacement. In all three views; AP, lateral and sunrise views. The hardware is in place and shows excellent alignment as well as fixation. There is no evidence of any fracture, dislocation or loosening of any hardware.\par  [de-identified] : Status post left total knee replacement on March 2, 2023 [de-identified] : The patient was assured that they are progressing well post operatively. They were explained that the prosthesis is in perfect alignment, perfect placement and fixated well.  It was explained that they are progressing well and as expected. It is recommended that the pt continue with the current treatment plan. This includes an ongoing exercise program, ice/moist heat when needed and NSAID's when needed. It was explained that a good exercise routine will help with pain relief and improve the overall longevity of the prosthesis. The patient is advised to return to the office in another year or so for further evaluation and x-rays. However, if there is any increase in pain, redness, swelling, heat, discharge, fever, change in ambulation or pain. The patient is strongly advised to call the office sooner.\par \par I, Dr. Weinstein, personally performed the evaluation and management services for this established patient who presents today with an orthopedic condition. The evaluation and management includes conducting the conditions and establishing a plan of care.\par \par Today, my ACP Troy Mckinley [facility], was here to assist with the patient in my evaluation and management services for this condition which will be followed going forward.\par \par 25 minutes were spent, face to face, in direct consultation with the patient. This includes reviewing the natural history of their Dx., eliciting the history, performing an orthopedic exam, review of the x-ray findings, forming a differential Dx and discussing all treatment options. This Includes both surgical and non-surgical treatments. I also reviewed all the risks and benefits of non-operative & operative Tx options, future impact into orthopedic functions/problems, activity restrictions both at home and at work, and all follow up requirements.\par \par

## 2023-05-17 ENCOUNTER — TRANSCRIPTION ENCOUNTER (OUTPATIENT)
Age: 78
End: 2023-05-17

## 2023-05-24 ENCOUNTER — APPOINTMENT (OUTPATIENT)
Dept: ORTHOPEDIC SURGERY | Facility: CLINIC | Age: 78
End: 2023-05-24
Payer: COMMERCIAL

## 2023-05-24 VITALS
DIASTOLIC BLOOD PRESSURE: 70 MMHG | SYSTOLIC BLOOD PRESSURE: 122 MMHG | HEART RATE: 66 BPM | HEIGHT: 67 IN | BODY MASS INDEX: 28.25 KG/M2 | WEIGHT: 180 LBS

## 2023-05-24 DIAGNOSIS — S13.9XXA SPRAIN OF JOINTS AND LIGAMENTS OF UNSPECIFIED PARTS OF NECK, INITIAL ENCOUNTER: ICD-10-CM

## 2023-05-24 DIAGNOSIS — S33.5XXA SPRAIN OF LIGAMENTS OF LUMBAR SPINE, INITIAL ENCOUNTER: ICD-10-CM

## 2023-05-24 PROCEDURE — 72050 X-RAY EXAM NECK SPINE 4/5VWS: CPT

## 2023-05-24 PROCEDURE — 99214 OFFICE O/P EST MOD 30 MIN: CPT

## 2023-05-24 PROCEDURE — 72110 X-RAY EXAM L-2 SPINE 4/>VWS: CPT

## 2023-05-24 RX ORDER — LIDOCAINE 5% 700 MG/1
5 PATCH TOPICAL
Qty: 1 | Refills: 2 | Status: ACTIVE | COMMUNITY
Start: 2023-05-24 | End: 1900-01-01

## 2023-05-24 RX ORDER — METHOCARBAMOL 500 MG/1
500 TABLET, FILM COATED ORAL 3 TIMES DAILY
Qty: 30 | Refills: 0 | Status: ACTIVE | COMMUNITY
Start: 2023-05-24 | End: 1900-01-01

## 2023-05-24 RX ORDER — DICLOFENAC SODIUM 50 MG/1
50 TABLET, DELAYED RELEASE ORAL
Qty: 30 | Refills: 2 | Status: ACTIVE | COMMUNITY
Start: 2023-05-24 | End: 1900-01-01

## 2023-05-24 NOTE — REASON FOR VISIT
[Initial Visit] : an initial visit for [No Fault] : this visit is related to no fault  [Back Pain] : back pain [Neck Pain] : neck pain
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
## 2023-05-24 NOTE — DISCUSSION/SUMMARY
[Medication Risks Reviewed] : Medication risks reviewed [de-identified] : Patient presents with increasing right leg pain with numbness tingling and on exam slight weakness of his EHL consistent with L4-5 radiculopathy on the right side.  Prescribed him diclofenac for this as well as methocarbamol.  He has reported some low back pain and stiffness.  Lumbosacral orthosis was prescribed for his back pain symptoms.\par \par He also has stiffness in the cervical spine with limited range of motion especially to the left side and in extension.  Prescribed a lidocaine patch for this and he may use of diclofenac and methocarbamol as well.\par \par Physical therapy was prescribed for him.\par \par Given the degenerative changes neck pain and stiffness as well as left-sided pain pattern recommended MRI cervical spine for further evaluation.  He will be traveling next week and MRI assessment is important prior to his travel.\par \par The patient was educated regarding their condition, treatment options as well as prescribed course of treatment. \par Risks and benefits as well as alternatives to the proposed treatment were also provided to the patient \par They were given the opportunity to have all their questions answered to their satisfaction.\par \par Vital signs were reviewed with the patient and the patient was instructed to followup with their primary care provider for further management. There were no PAs or scribes used in the evaluation, exam or treatment plan discussion. The surgeon was the primary evaluating or treating physician as noted above.

## 2023-05-24 NOTE — PHYSICAL EXAM
[Normal] : Gait: normal [Sharma's Sign] : negative Sharma's sign [SLR] : positive straight leg raise [] : Motor: [UE Motor Strength NL] : Motor strength of the bilateral upper extremities is normal [NL] : Motor strength of the left lower extremity was normal [___/5] : right ([unfilled]/5) [UE/LE] : Sensory: Intact in bilateral upper & lower extremities [1+] : left ankle jerk 1+ [Plantar Reflex Right Only] : absent on the right [Plantar Reflex Left Only] : absent on the left [DTR Reflexes Clonus Of Right Ankle (___ Beats)] : absent on the right [DTR Reflexes Clonus Of Left Ankle (___ Beats)] : absent on the left [DP] : dorsalis pedis 2+ and symmetric bilaterally [Rad] : radial 2+ and symmetric bilaterally [de-identified] : No obvious ecchymotic lesions noted.  Range of motion of the cervical spine is limited with forward flexion of 30 degrees extension to neutral left lateral rotation of 30 degrees right lateral rotation of 40 degrees with discomfort and left lateral rotation.  Paraspinal cervical spasm appreciated left side of the cervical spine more than the right.  In the lumbar spine he can forward flex to his knees and extend 20 degrees with discomfort at end range of motion especially in extension.  Paraspinal lumbar spasm noted bilaterally.\par Awake alert oriented to self place and time.  No acute distress. [de-identified] : Well-healed bilateral knee incisions. [de-identified] : 4 views lumbar spine obtained today demonstrate minimal right-sided lumbar curve measuring approximate 11 degrees from T12-L5.  Degenerative change along the concavity at L2-3 with large bridging left-sided osteophyte noted.  Bridging osteophytes in the lower thoracic spine down to L1 on the right.  On the lateral projection loss of lumbar lordosis segmental kyphosis noted from L2-L4 measuring approximately 5 degrees.  Degeneration most prominent at the L2-3 and L3-4 levels with loss of disc height also noted L5-S1.  No obvious acute fractures.  No dynamic instability between flexion-extension.  Trace calcification abdominal aorta.  \par \par 4 views cervical spine demonstrate no significant scoliosis.  Cervical kyphosis from C2-C4 measures approximately 12 degrees.  Degeneration with loss of disc height at C3-4 anterior osteophyte noted.  Some loss of disc height and anterior osteophyte also noted at C2-3.  Calcified lesion noted in the C2 vertebral body surrounding bony change suspicious for a bone island.  No obvious acute fractures.  No dynamic instability between flexion-extension.\par

## 2023-05-26 ENCOUNTER — APPOINTMENT (OUTPATIENT)
Dept: MRI IMAGING | Facility: CLINIC | Age: 78
End: 2023-05-26
Payer: SELF-PAY

## 2023-05-26 ENCOUNTER — OUTPATIENT (OUTPATIENT)
Dept: OUTPATIENT SERVICES | Facility: HOSPITAL | Age: 78
LOS: 1 days | End: 2023-05-26

## 2023-05-26 DIAGNOSIS — Z98.49 CATARACT EXTRACTION STATUS, UNSPECIFIED EYE: Chronic | ICD-10-CM

## 2023-05-26 DIAGNOSIS — Z98.890 OTHER SPECIFIED POSTPROCEDURAL STATES: Chronic | ICD-10-CM

## 2023-05-26 DIAGNOSIS — Z96.651 PRESENCE OF RIGHT ARTIFICIAL KNEE JOINT: Chronic | ICD-10-CM

## 2023-05-26 PROCEDURE — 72141 MRI NECK SPINE W/O DYE: CPT | Mod: 26

## 2023-05-31 ENCOUNTER — APPOINTMENT (OUTPATIENT)
Dept: ORTHOPEDIC SURGERY | Facility: CLINIC | Age: 78
End: 2023-05-31
Payer: COMMERCIAL

## 2023-05-31 VITALS
SYSTOLIC BLOOD PRESSURE: 125 MMHG | DIASTOLIC BLOOD PRESSURE: 79 MMHG | WEIGHT: 175 LBS | HEART RATE: 66 BPM | HEIGHT: 67 IN | BODY MASS INDEX: 27.47 KG/M2

## 2023-05-31 DIAGNOSIS — M50.30 OTHER CERVICAL DISC DEGENERATION, UNSPECIFIED CERVICAL REGION: ICD-10-CM

## 2023-05-31 DIAGNOSIS — M54.50 LOW BACK PAIN, UNSPECIFIED: ICD-10-CM

## 2023-05-31 DIAGNOSIS — M54.2 CERVICALGIA: ICD-10-CM

## 2023-05-31 DIAGNOSIS — G89.11 LOW BACK PAIN, UNSPECIFIED: ICD-10-CM

## 2023-05-31 PROCEDURE — 99214 OFFICE O/P EST MOD 30 MIN: CPT
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
## 2023-05-31 NOTE — DISCUSSION/SUMMARY
[Medication Risks Reviewed] : Medication risks reviewed [de-identified] : MRI cervical spine performed previously was independent reviewed by me and findings discussed with the patient.  He has multilevel disc degeneration with foraminal narrowing and moderate to severe left foraminal narrowing at C5-6.  No acute fractures.\par \par The patient has had slight improvement of his neck pain to approximately 10 to 20%.  Diclofenac and methocarbamol intermittently.  Recommended he continue those medications over the next 1 to 2 weeks.  Physical therapy was also encouraged for him for the cervical spine as well as a low back pain radiating to his right leg.  He feels that his neck pain is improving but his low back pain is worsening.  Given the degenerative changes and the mild scoliosis noted on x-rays as well as increasing pain symptoms with increased radiation down his right leg we discussed the option of MRI lumbar spine for further evaluation.  We will order the MRI which she can obtain on his return from his travel.  \par \par Lumbar epidural steroid injections may also be considered after the MRI.  He has been prescribed a lumbar corset which he has been unable to obtain yet.  This might also be helpful for his low back pain symptoms.\par \par He is scheduled to travel for the next 1 month recommended use of those prescribed medications and bracing during his travel.  He will follow-up with Dr. Vasquez on his return, Dr Vasquez is currently away and I am covering this patient for him.

## 2023-05-31 NOTE — HISTORY OF PRESENT ILLNESS
[Stable] : stable [8] : a current pain level of 8/10 [Walking] : walking [Daily] : ~He/She~ states the symptoms seem to be occuring daily [Heat] : relieved by heat [Rest] : relieved by rest [de-identified] : Patient is here today to review mri cervical spine 5/26/2023. Overall coming along medication diclofenac and robaxin seems to help.\par Had a session or two of PT with some relief [de-identified] : in the am [de-identified] : medication
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
## 2023-07-19 ENCOUNTER — APPOINTMENT (OUTPATIENT)
Dept: ORTHOPEDIC SURGERY | Facility: CLINIC | Age: 78
End: 2023-07-19
Payer: COMMERCIAL

## 2023-07-19 VITALS — HEART RATE: 71 BPM | SYSTOLIC BLOOD PRESSURE: 131 MMHG | DIASTOLIC BLOOD PRESSURE: 79 MMHG

## 2023-07-19 PROCEDURE — 72110 X-RAY EXAM L-2 SPINE 4/>VWS: CPT

## 2023-07-19 PROCEDURE — 99214 OFFICE O/P EST MOD 30 MIN: CPT

## 2023-07-19 NOTE — ADDENDUM
[FreeTextEntry1] : I, Don Lucy Rubio, acted solely as a scribe for Dr. Ayden Vasquez on this date 07/19/2023 .\par \par All medical record entries made by the Scribe were at my, Dr. Ayden Vasquez, direction and personally dictated by me on 07/19/2023 . I have reviewed the chart and agree that the record accurately reflects my personal performance of the history, physical exam, assessment and plan. I have also personally directed, reviewed, and agreed with the chart.

## 2023-07-19 NOTE — HISTORY OF PRESENT ILLNESS
[___ yrs] : [unfilled] year(s) ago [7] : a current pain level of 7/10 [Walking] : walking [Standing] : standing [Intermit.] : ~He/She~ states the symptoms seem to be intermittent [de-identified] : A 78 year old male presents an initial visit for back pain. Patient was last seen in 2020. The patient states that he has pain to his lower back with radicular symptoms to his legs bilaterally. The patient denies any symptoms of numbness, tingling, or weakness. He denies any buckling or giving away of his knee.  The patient states that he attended physical therapy in the past and found improvement to his symptoms with attendance. Patient actively hikes at this time. \par

## 2023-07-19 NOTE — DISCUSSION/SUMMARY
[PRN] : PRN [de-identified] : I discussed the underlying pathophysiology of the patient's condition & X-Ray findings. I expressed that his symptoms are consistent with degenerative arthritis in his lower back, which has no significant progression since his last visit. I highly recommended that the patient starts a course of physical therapy at this time. I provided the patient with a detailed prescription for physical therapy. If the patient has any severe exacerbation of his symptoms, he  should follow-up with me as soon as possible. Otherwise, the patient should follow-up with me as needed.

## 2023-08-29 ENCOUNTER — APPOINTMENT (OUTPATIENT)
Dept: GASTROENTEROLOGY | Facility: CLINIC | Age: 78
End: 2023-08-29
Payer: MEDICARE

## 2023-08-29 VITALS
DIASTOLIC BLOOD PRESSURE: 60 MMHG | TEMPERATURE: 98 F | SYSTOLIC BLOOD PRESSURE: 120 MMHG | WEIGHT: 180.25 LBS | BODY MASS INDEX: 28.29 KG/M2 | HEART RATE: 73 BPM | OXYGEN SATURATION: 98 % | RESPIRATION RATE: 14 BRPM | HEIGHT: 67 IN

## 2023-08-29 DIAGNOSIS — J45.909 UNSPECIFIED ASTHMA, UNCOMPLICATED: ICD-10-CM

## 2023-08-29 DIAGNOSIS — R10.32 LEFT LOWER QUADRANT PAIN: ICD-10-CM

## 2023-08-29 DIAGNOSIS — G89.29 LEFT LOWER QUADRANT PAIN: ICD-10-CM

## 2023-08-29 DIAGNOSIS — R94.31 ABNORMAL ELECTROCARDIOGRAM [ECG] [EKG]: ICD-10-CM

## 2023-08-29 DIAGNOSIS — K44.9 DIAPHRAGMATIC HERNIA W/OUT OBSTRUCTION OR GANGRENE: ICD-10-CM

## 2023-08-29 DIAGNOSIS — K21.9 DIAPHRAGMATIC HERNIA W/OUT OBSTRUCTION OR GANGRENE: ICD-10-CM

## 2023-08-29 PROCEDURE — 99214 OFFICE O/P EST MOD 30 MIN: CPT
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
## 2023-08-31 DIAGNOSIS — A07.1 GIARDIASIS [LAMBLIASIS]: ICD-10-CM

## 2023-08-31 LAB
GI PCR PANEL: DETECTED
GIARDIA LAMBLIA: DETECTED

## 2023-08-31 RX ORDER — METRONIDAZOLE 500 MG/1
500 TABLET ORAL 3 TIMES DAILY
Qty: 30 | Refills: 0 | Status: ACTIVE | COMMUNITY
Start: 2023-08-31 | End: 1900-01-01

## 2023-09-04 LAB — CALPROTECTIN FECAL: 55 UG/G

## 2023-09-25 ENCOUNTER — APPOINTMENT (OUTPATIENT)
Dept: ORTHOPEDIC SURGERY | Facility: CLINIC | Age: 78
End: 2023-09-25
Payer: COMMERCIAL

## 2023-09-25 VITALS — HEART RATE: 76 BPM | SYSTOLIC BLOOD PRESSURE: 118 MMHG | DIASTOLIC BLOOD PRESSURE: 71 MMHG

## 2023-09-25 DIAGNOSIS — M51.37 OTHER INTERVERTEBRAL DISC DEGENERATION, LUMBOSACRAL REGION: ICD-10-CM

## 2023-09-25 DIAGNOSIS — M54.16 RADICULOPATHY, LUMBAR REGION: ICD-10-CM

## 2023-09-25 PROCEDURE — 99214 OFFICE O/P EST MOD 30 MIN: CPT

## 2023-09-26 ENCOUNTER — APPOINTMENT (OUTPATIENT)
Dept: MRI IMAGING | Facility: IMAGING CENTER | Age: 78
End: 2023-09-26
Payer: MEDICARE

## 2023-09-26 ENCOUNTER — OUTPATIENT (OUTPATIENT)
Dept: OUTPATIENT SERVICES | Facility: HOSPITAL | Age: 78
LOS: 1 days | End: 2023-09-26
Payer: MEDICARE

## 2023-09-26 DIAGNOSIS — Z98.49 CATARACT EXTRACTION STATUS, UNSPECIFIED EYE: Chronic | ICD-10-CM

## 2023-09-26 DIAGNOSIS — Z96.651 PRESENCE OF RIGHT ARTIFICIAL KNEE JOINT: Chronic | ICD-10-CM

## 2023-09-26 DIAGNOSIS — Z98.890 OTHER SPECIFIED POSTPROCEDURAL STATES: Chronic | ICD-10-CM

## 2023-09-26 DIAGNOSIS — M51.37 OTHER INTERVERTEBRAL DISC DEGENERATION, LUMBOSACRAL REGION: ICD-10-CM

## 2023-09-26 PROCEDURE — 72148 MRI LUMBAR SPINE W/O DYE: CPT

## 2023-09-26 PROCEDURE — 72148 MRI LUMBAR SPINE W/O DYE: CPT | Mod: 26,MH

## 2023-10-09 ENCOUNTER — APPOINTMENT (OUTPATIENT)
Dept: PULMONOLOGY | Facility: CLINIC | Age: 78
End: 2023-10-09
Payer: MEDICARE

## 2023-10-09 VITALS
HEIGHT: 67 IN | BODY MASS INDEX: 27.49 KG/M2 | OXYGEN SATURATION: 97 % | SYSTOLIC BLOOD PRESSURE: 118 MMHG | HEART RATE: 78 BPM | WEIGHT: 175.13 LBS | TEMPERATURE: 97.8 F | RESPIRATION RATE: 18 BRPM | DIASTOLIC BLOOD PRESSURE: 74 MMHG

## 2023-10-09 DIAGNOSIS — R06.02 SHORTNESS OF BREATH: ICD-10-CM

## 2023-10-09 DIAGNOSIS — J44.9 CHRONIC OBSTRUCTIVE PULMONARY DISEASE, UNSPECIFIED: ICD-10-CM

## 2023-10-09 DIAGNOSIS — R76.8 OTHER SPECIFIED ABNORMAL IMMUNOLOGICAL FINDINGS IN SERUM: ICD-10-CM

## 2023-10-09 DIAGNOSIS — K21.9 GASTRO-ESOPHAGEAL REFLUX DISEASE W/OUT ESOPHAGITIS: ICD-10-CM

## 2023-10-09 DIAGNOSIS — J45.50 SEVERE PERSISTENT ASTHMA, UNCOMPLICATED: ICD-10-CM

## 2023-10-09 DIAGNOSIS — J30.9 ALLERGIC RHINITIS, UNSPECIFIED: ICD-10-CM

## 2023-10-09 PROCEDURE — 94010 BREATHING CAPACITY TEST: CPT

## 2023-10-09 PROCEDURE — 95012 NITRIC OXIDE EXP GAS DETER: CPT

## 2023-10-09 PROCEDURE — 94729 DIFFUSING CAPACITY: CPT

## 2023-10-09 PROCEDURE — 94727 GAS DIL/WSHOT DETER LNG VOL: CPT

## 2023-10-09 PROCEDURE — 99214 OFFICE O/P EST MOD 30 MIN: CPT | Mod: 25

## 2023-10-20 RX ORDER — METHYLPREDNISOLONE 4 MG/1
4 TABLET ORAL
Qty: 1 | Refills: 0 | Status: ACTIVE | COMMUNITY
Start: 2023-10-20 | End: 1900-01-01

## 2023-10-31 ENCOUNTER — APPOINTMENT (OUTPATIENT)
Dept: PAIN MANAGEMENT | Facility: CLINIC | Age: 78
End: 2023-10-31
Payer: MEDICARE

## 2023-10-31 VITALS — HEIGHT: 67 IN | WEIGHT: 175 LBS | BODY MASS INDEX: 27.47 KG/M2

## 2023-10-31 DIAGNOSIS — M54.16 RADICULOPATHY, LUMBAR REGION: ICD-10-CM

## 2023-10-31 PROCEDURE — 99204 OFFICE O/P NEW MOD 45 MIN: CPT | Mod: 95

## 2023-11-01 ENCOUNTER — TRANSCRIPTION ENCOUNTER (OUTPATIENT)
Age: 78
End: 2023-11-01

## 2023-11-01 NOTE — ASU PATIENT PROFILE, ADULT - NSICDXPASTMEDICALHX_GEN_ALL_CORE_FT
PAST MEDICAL HISTORY:  Acute right lumbar radiculopathy     COVID-19 vaccine series completed     Dry eye syndrome, bilateral     GERD (gastroesophageal reflux disease)     H/O gastritis     Hiatal hernia     History of COVID-19     Osteoarthritis     Osteoarthritis of left knee

## 2023-11-01 NOTE — ASU PATIENT PROFILE, ADULT - NSICDXPASTSURGICALHX_GEN_ALL_CORE_FT
PAST SURGICAL HISTORY:  History of right knee joint replacement     S/P arthroscopy of left knee 1993    S/P arthroscopy of right knee 1993    S/P cataract extraction 2010- bilateral     PAST SURGICAL HISTORY:  History of right knee joint replacement     S/P arthroscopy of left knee 1993    S/P arthroscopy of right knee 1993    S/P cataract extraction 2010- bilateral    S/P total knee replacement, left

## 2023-11-01 NOTE — ASU DISCHARGE PLAN (ADULT/PEDIATRIC) - NS MD DC FALL RISK RISK
For information on Fall & Injury Prevention, visit: [email or web address] OR  [email or web address] OR  https://www.cdc.gov/steadi/patient.html

## 2023-11-01 NOTE — ASU PATIENT PROFILE, ADULT - FALL HARM RISK - UNIVERSAL INTERVENTIONS
Bed in lowest position, wheels locked, appropriate side rails in place/Call bell, personal items and telephone in reach/Instruct patient to call for assistance before getting out of bed or chair/Non-slip footwear when patient is out of bed/[place] to call system/Physically safe environment - no spills, clutter or unnecessary equipment/Purposeful Proactive Rounding/Room/bathroom lighting operational, light cord in reach

## 2023-11-02 ENCOUNTER — OUTPATIENT (OUTPATIENT)
Dept: OUTPATIENT SERVICES | Facility: HOSPITAL | Age: 78
LOS: 1 days | End: 2023-11-02
Payer: COMMERCIAL

## 2023-11-02 ENCOUNTER — APPOINTMENT (OUTPATIENT)
Dept: ORTHOPEDIC SURGERY | Facility: HOSPITAL | Age: 78
End: 2023-11-02
Payer: COMMERCIAL

## 2023-11-02 VITALS
OXYGEN SATURATION: 100 % | HEIGHT: 67 IN | DIASTOLIC BLOOD PRESSURE: 66 MMHG | WEIGHT: 175.05 LBS | TEMPERATURE: 98 F | SYSTOLIC BLOOD PRESSURE: 128 MMHG | HEART RATE: 61 BPM | RESPIRATION RATE: 13 BRPM

## 2023-11-02 VITALS — SYSTOLIC BLOOD PRESSURE: 120 MMHG | RESPIRATION RATE: 16 BRPM | DIASTOLIC BLOOD PRESSURE: 77 MMHG | HEART RATE: 64 BPM

## 2023-11-02 DIAGNOSIS — Z96.651 PRESENCE OF RIGHT ARTIFICIAL KNEE JOINT: Chronic | ICD-10-CM

## 2023-11-02 DIAGNOSIS — Z98.890 OTHER SPECIFIED POSTPROCEDURAL STATES: Chronic | ICD-10-CM

## 2023-11-02 DIAGNOSIS — Z96.652 PRESENCE OF LEFT ARTIFICIAL KNEE JOINT: Chronic | ICD-10-CM

## 2023-11-02 DIAGNOSIS — Z98.49 CATARACT EXTRACTION STATUS, UNSPECIFIED EYE: Chronic | ICD-10-CM

## 2023-11-02 DIAGNOSIS — M54.16 RADICULOPATHY, LUMBAR REGION: ICD-10-CM

## 2023-11-02 PROCEDURE — 64484 NJX AA&/STRD TFRM EPI L/S EA: CPT | Mod: RT

## 2023-11-02 PROCEDURE — 64483 NJX AA&/STRD TFRM EPI L/S 1: CPT | Mod: RT

## 2023-11-02 PROCEDURE — 64484 NJX AA&/STRD TFRM EPI L/S EA: CPT | Mod: 59,RT

## 2023-11-02 RX ORDER — DICLOFENAC SODIUM 75 MG/1
1 TABLET, DELAYED RELEASE ORAL
Refills: 0 | DISCHARGE

## 2023-11-02 RX ORDER — PANTOPRAZOLE SODIUM 20 MG/1
1 TABLET, DELAYED RELEASE ORAL
Qty: 0 | Refills: 0 | DISCHARGE

## 2023-11-02 RX ORDER — BUDESONIDE AND FORMOTEROL FUMARATE DIHYDRATE 160; 4.5 UG/1; UG/1
2 AEROSOL RESPIRATORY (INHALATION)
Refills: 0 | DISCHARGE

## 2023-11-02 RX ORDER — DOCUSATE SODIUM 100 MG
1 CAPSULE ORAL
Qty: 0 | Refills: 0 | DISCHARGE

## 2023-11-02 RX ORDER — LIDOCAINE 4 G/100G
1 CREAM TOPICAL
Refills: 0 | DISCHARGE

## 2023-11-02 RX ORDER — FAMOTIDINE 10 MG/ML
1 INJECTION INTRAVENOUS
Refills: 0 | DISCHARGE

## 2023-11-02 RX ORDER — IBUPROFEN 200 MG
1 TABLET ORAL
Refills: 0 | DISCHARGE

## 2023-11-02 RX ORDER — FLUTICASONE PROPIONATE 220 MCG
250 AEROSOL WITH ADAPTER (GRAM) INHALATION
Qty: 0 | Refills: 0 | DISCHARGE

## 2023-11-02 RX ORDER — OMALIZUMAB 150 MG/ML
300 INJECTION, SOLUTION SUBCUTANEOUS
Refills: 0 | DISCHARGE

## 2023-11-02 RX ORDER — METHOCARBAMOL 500 MG/1
2 TABLET, FILM COATED ORAL
Refills: 0 | DISCHARGE

## 2023-11-02 RX ORDER — TAMSULOSIN HYDROCHLORIDE 0.4 MG/1
1 CAPSULE ORAL
Refills: 0 | DISCHARGE

## 2024-03-04 PROBLEM — M54.16 RADICULOPATHY, LUMBAR REGION: Chronic | Status: ACTIVE | Noted: 2023-11-01

## 2024-03-16 ENCOUNTER — NON-APPOINTMENT (OUTPATIENT)
Age: 79
End: 2024-03-16

## 2024-03-16 ENCOUNTER — APPOINTMENT (OUTPATIENT)
Dept: ORTHOPEDIC SURGERY | Facility: CLINIC | Age: 79
End: 2024-03-16
Payer: COMMERCIAL

## 2024-03-16 VITALS — DIASTOLIC BLOOD PRESSURE: 81 MMHG | HEART RATE: 64 BPM | SYSTOLIC BLOOD PRESSURE: 128 MMHG

## 2024-03-16 DIAGNOSIS — M51.9 UNSPECIFIED THORACIC, THORACOLUMBAR AND LUMBOSACRAL INTERVERTEBRAL DISC DISORDER: ICD-10-CM

## 2024-03-16 DIAGNOSIS — M51.26 OTHER INTERVERTEBRAL DISC DISPLACEMENT, LUMBAR REGION: ICD-10-CM

## 2024-03-16 PROCEDURE — 99213 OFFICE O/P EST LOW 20 MIN: CPT

## 2024-03-18 ENCOUNTER — APPOINTMENT (OUTPATIENT)
Dept: PULMONOLOGY | Facility: CLINIC | Age: 79
End: 2024-03-18

## 2024-03-18 PROBLEM — M51.26 LUMBAR HERNIATED DISC: Status: ACTIVE | Noted: 2020-09-23

## 2024-03-18 NOTE — DISCUSSION/SUMMARY
[Medication Risks Reviewed] : Medication risks reviewed [Surgical risks reviewed] : Surgical risks reviewed [PRN] : PRN [de-identified] : Encouraged patient to continue being active.  We can hold off on a pain management option at this time.  Activity modifications were discussed.  Patient will follow-up with us as needed.  I, Dr. Ayden Vasquez, personally performed the evaluation and management (E/M) services for this new patient.  That E/M includes conducting the initial examination, assessing all conditions, and establishing a plan of care.  Today, my ACP, Mery Rowan, was here to observe my evaluation and management services for this patient to be followed going forward.

## 2024-03-18 NOTE — HISTORY OF PRESENT ILLNESS
[Pain Location] : pain [] : right buttock to right knee [Improving] : improving [2] : a current pain level of 2/10 [Sitting] : sitting [Daily] : ~He/She~ states the symptoms seem to be occuring daily [Exercise Regimen] : relieved by exercise regimen [Prolonged Sitting] : worsened by prolonged sitting [de-identified] : Follow-up of right-sided radiculopathy with decreasing pain in comparison to a few months ago patient has increased his exercises and has undergone physical therapy.  Patient does state that the pain can vary between 2-9/10 and findings that sitting for long prolonged period of time aggravates the radiculitis into the right leg, patient realizes that he must get up and start walking to relieve the discomfort. Patient is currently walking 5 miles a day.

## 2024-03-18 NOTE — PHYSICAL EXAM
[Normal DTR Reflexes] : DTR reflexes normal [Normal] : Oriented to person, place, and time, insight and judgement were intact and the affect was normal [LE] : Sensory: Intact in bilateral lower extremities [de-identified] : No new x-rays

## 2024-03-19 LAB
APPEARANCE: CLEAR
BACTERIA: NEGATIVE /HPF
BILIRUBIN URINE: NEGATIVE
BLOOD URINE: NEGATIVE
CAST: 0 /LPF
COLOR: YELLOW
EPITHELIAL CELLS: 0 /HPF
GLUCOSE QUALITATIVE U: NEGATIVE MG/DL
KETONES URINE: NEGATIVE MG/DL
LEUKOCYTE ESTERASE URINE: NEGATIVE
MICROSCOPIC-UA: NORMAL
NITRITE URINE: NEGATIVE
PH URINE: 6
PROTEIN URINE: NEGATIVE MG/DL
PSA FREE FLD-MCNC: 28 %
PSA FREE SERPL-MCNC: 0.32 NG/ML
PSA SERPL-MCNC: 1.15 NG/ML
RED BLOOD CELLS URINE: 0 /HPF
SPECIFIC GRAVITY URINE: 1.01
URINE CYTOLOGY: NORMAL
UROBILINOGEN URINE: 0.2 MG/DL
WHITE BLOOD CELLS URINE: 1 /HPF

## 2024-03-20 LAB — BACTERIA UR CULT: NORMAL

## 2024-03-22 ENCOUNTER — APPOINTMENT (OUTPATIENT)
Dept: GASTROENTEROLOGY | Facility: CLINIC | Age: 79
End: 2024-03-22
Payer: MEDICARE

## 2024-03-22 VITALS
HEIGHT: 67 IN | SYSTOLIC BLOOD PRESSURE: 130 MMHG | OXYGEN SATURATION: 99 % | WEIGHT: 180 LBS | HEART RATE: 68 BPM | TEMPERATURE: 98 F | DIASTOLIC BLOOD PRESSURE: 70 MMHG | RESPIRATION RATE: 14 BRPM | BODY MASS INDEX: 28.25 KG/M2

## 2024-03-22 DIAGNOSIS — R19.7 DIARRHEA, UNSPECIFIED: ICD-10-CM

## 2024-03-22 PROCEDURE — 99214 OFFICE O/P EST MOD 30 MIN: CPT

## 2024-03-22 RX ORDER — PANTOPRAZOLE 40 MG/1
40 TABLET, DELAYED RELEASE ORAL
Qty: 90 | Refills: 3 | Status: ACTIVE | COMMUNITY
Start: 2024-03-22 | End: 1900-01-01

## 2024-03-22 RX ORDER — METRONIDAZOLE 500 MG/1
500 TABLET ORAL 3 TIMES DAILY
Qty: 42 | Refills: 0 | Status: ACTIVE | COMMUNITY
Start: 2024-03-22 | End: 1900-01-01

## 2024-03-22 NOTE — ASSESSMENT
[FreeTextEntry1] : 78 with eructation and GERD referred for TIF. Has HH characterized as 3 cm but referred to as large. GERD symptoms are mostly eructation. reportedly has fast eating by son. No chest pain or dysphagia. Mild burning regurgitation but this is minimal. No esophagitis on EGD. No nausea or vomiting.   S/p left knee replacment.  COmplains of gerd, not using pantoprazole . Wants to use famotidine. we reviewed the benefits of PPI with pt, and he agrees to resume. Also with bloating and would like stool testing again.  IMP: 1. 3 cm hiatal hernia, la grade c esophagitis 2. R/o enteritis due to bacteria / viruses PLAN: 1. resume pantoprazole for 90 days 2. RTO 3months 3. trial of metronidazole for 1 weeks.
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
## 2024-03-25 ENCOUNTER — RX RENEWAL (OUTPATIENT)
Age: 79
End: 2024-03-25

## 2024-03-25 ENCOUNTER — APPOINTMENT (OUTPATIENT)
Dept: PULMONOLOGY | Facility: CLINIC | Age: 79
End: 2024-03-25
Payer: MEDICARE

## 2024-03-25 VITALS
WEIGHT: 177 LBS | TEMPERATURE: 97.3 F | HEIGHT: 67 IN | SYSTOLIC BLOOD PRESSURE: 144 MMHG | BODY MASS INDEX: 27.78 KG/M2 | RESPIRATION RATE: 16 BRPM | OXYGEN SATURATION: 98 % | DIASTOLIC BLOOD PRESSURE: 80 MMHG | HEART RATE: 88 BPM

## 2024-03-25 PROCEDURE — 99215 OFFICE O/P EST HI 40 MIN: CPT | Mod: 25

## 2024-03-25 PROCEDURE — 94010 BREATHING CAPACITY TEST: CPT

## 2024-03-25 PROCEDURE — 95012 NITRIC OXIDE EXP GAS DETER: CPT

## 2024-03-25 RX ORDER — AZELASTINE HYDROCHLORIDE 137 UG/1
0.1 SPRAY, METERED NASAL TWICE DAILY
Qty: 1 | Refills: 3 | Status: ACTIVE | COMMUNITY
Start: 2019-12-09 | End: 1900-01-01

## 2024-03-25 RX ORDER — MONTELUKAST 10 MG/1
10 TABLET, FILM COATED ORAL
Qty: 90 | Refills: 0 | Status: ACTIVE | COMMUNITY
Start: 2024-03-25 | End: 1900-01-01

## 2024-03-25 NOTE — ASSESSMENT
[FreeTextEntry1] : Mr. LIVINGSTON is a 79 year old male with a history of nonsmoker, benign familial tremor, BPH, chronic sinus issues, GERD, OA, low vitamin D, lumbar disc disease who now comes to the office for a follow up pulmonary evaluation for asthma, allergy, and GERD.  His shortness of breath is multifactorial due to: -poor mechanics of breathing  -out of shape / overweight -pulmonary disease      -Severe persistent asthma -cardiac disease   problem 1: Severe Persistent Asthma (noncompliant)- semi controlled  -continue Symbicort (160) 2 inhalations BID (generic) (purchased from [facility]) -continue Singulair 10 mg QHS ([facility]) -continue Ventolin 2 puffs Q6H, pre-exercise -s/p Prednisone 20 mg x 7 days, 10 mg x 7 days 12/2021 - Asthma is believed to be caused by inherited (genetic) and environmental factor, but its exact cause is unknown. Asthma may be triggered by allergens, lung infections, or irritants in the air. Asthma triggers are different for each person   Problem 1A: Elevated IgE -Patient refused Xolair  -Xolair is a recombinant DNA- derived humanized IgG1K monoclonal antibody that selectively binds to human immunoglobulin E (IgE). Xolair is produced by a Chinese hamster ovary cell suspension culture in nutrient medium containing the antibiotic gentamicin. Gentamicin is not detectable in the final product. Xolair is a sterile, white, preservative free, lyophilized powder contained in a single use vial that is reconstituted with sterile water for suspension. Side effects include: wheezing, tightness of the chest, trouble breathing, hives, skin rash, feeling anxious or light-headed, fainting, warmth or tingling under skin, or swelling of face, lips, or tongue   Problem 2: allergies/sinus- active - continue OTC Flonase 1 sniff/nostril BID -continue Azelastine at 0.1% 1 spray BID  -OTC Claritin PRN - continue Singulair 10 mg QHS - Recommended Navage - s/p Blood work to include: asthma panel (+), food IgE panel (+), IgE level (+: 117), eosinophil level (wnl), vitamin D level (wnl) - Environmental measures for allergies were encouraged including mattress and pillow covers, air purifier, and environmental controls.  Problem 3: GERD/GI infection (anaerobic bacteria) (Homeopathic treatment) - Dr. Meeks  - Continue Protonix 40 mg QAM -Currently on Metronidazole x 14 days (Dr. Meeks) - Recommended organic apple cider vinegar tablets  -Kefir yogurt, hold on supplements.  -Rule of 2s: avoid eating too much, eating too late, eating too spicy, eating two hours before bed. -Things to avoid including overeating, spicy foods, tight clothing, eating within three hours of bed, this list is not all inclusive.  -For treatment of reflux, possible options discussed including diet control, H2 blockers, PPIs, as well as coating motility agents discussed as treatment options. Timing of meals and proximity of last meal to sleep were discussed. If symptoms persist, a formal gastrointestinal evaluation is needed.   problem 4: cardiac disease -recommended to continue to follow up with Cardiologist ([facility])  Problem 5: COVID-19 precautionary Immune Support Recommendations: s/p Moderna vaccine x3  OTC Vitamin C 500mg BID  -OTC Quercetin 250-500mg BID  -OTC Zinc 75-100mg per day  -OTC Melatonin 1 or 2mg a night  -OTC Vitamin D 1-4000mg per day  -OTC Tonic Water 8oz per day -Water 0.5-1 gallon per day  F/P in 4-6 months with Dr. Johnson He is encouraged to call with any changes, concerns, or questions.

## 2024-03-25 NOTE — HISTORY OF PRESENT ILLNESS
[FreeTextEntry1] : LAST VISIT 10/9/2023  Mr. Rogers is a 78 year old male with a history of asthma, GERD, SOB, and vitamin D deficiency presenting today for a follow up pulmonary evaluation. His chief complaint is   -he notes energy levels are good (8/10) -he notes getting enough sleep -he notes intermittent diarrhea which has slightly improved  -he notes having parasites, for which he took Rx -he notes sleeping 8 hours interrupted by nocturia X3 -he notes drinking water every time he wakes up -he notes wheezing in the winter and PND -he notes weight is stable  -he notes appetite is stable  -he notes taking vitamin C -he notes his L knee has completely healed. He is able to exercise without limitations. -he notes taking Clarinex every morning and tolerating it well -he notes pending trip to [facility] with his wife  -he denies any headaches, nausea, emesis, fever, chills, sweats, chest pain, chest pressure, coughing, palpitations, constipation, dysphagia, vertigo, arthralgias, myalgias, leg swelling, itchy eyes, itchy ears, heartburn, reflux, or sour taste in the mouth.  TODAY'S VISIT 3/25/24 Mr. Rogers is a 78 year old male with a history of asthma, GERD, SOB, and vitamin D deficiency presenting today for a follow up pulmonary evaluation. His chief complaint is   -reports runny nose and cough since Saturday attributing to PND -reports nasal congestion improves in the cold and worsens at night; uses OTC Flonase, OTC Claritin as needed; has not been using Singulair -reports he uses Symbicort inhaler and purchases inhaler from [facility] -reports itchy eyes; uses OTC Pataday eye drops -reports he will be going to [place] from June to July -reports he was diagnosed as having an anaerobic bacteria and is currently on Metronidazole x 14 days ordered by Dr. Meeks, GI -reports he takes Pantoprazole  denies any headaches, nausea, vomiting, fever, chills, sweats, chest pain, chest pressure, palpitations, coughing, wheezing, fatigue, constipation, dysphagia, dizziness, leg swelling, leg pain.

## 2024-03-25 NOTE — REVIEW OF SYSTEMS
[Postnasal Drip] : postnasal drip [Cough] : cough [Itchy Eyes] : itchy eyes [Seasonal Allergies] : seasonal allergies [Nasal Discharge] : nasal discharge [GERD] : gerd [Negative] : Endocrine [[id number]] : currently has GI bacterial infection

## 2024-03-25 NOTE — PHYSICAL EXAM
[No Acute Distress] : no acute distress [Normal Oropharynx] : normal oropharynx [Normal Appearance] : normal appearance [No Neck Mass] : no neck mass [Normal Rate/Rhythm] : normal rate/rhythm [Normal S1, S2] : normal s1, s2 [No Murmurs] : no murmurs [No Resp Distress] : no resp distress [No Abnormalities] : no abnormalities [Clear to Auscultation Bilaterally] : clear to auscultation bilaterally [Normal Gait] : normal gait [Benign] : benign [No Clubbing] : no clubbing [No Cyanosis] : no cyanosis [FROM] : FROM [No Edema] : no edema [Normal Color/ Pigmentation] : normal color/ pigmentation [No Focal Deficits] : no focal deficits [Oriented x3] : oriented x3 [Normal Affect] : normal affect

## 2024-03-25 NOTE — PROCEDURE
[FreeTextEntry1] : 3/25/2024 PFTs revealed mild-moderate obstructive dysfunction; FEV1 was 2.49L, which is 66.8% of predicted; normal flow volume loop. PFTs were performed to evaluate for SOB, asthma  FENO was 26; a normal value being less than 25  10/9/2023 PFTs revealed mild-moderate obstructive dysfunction; FEV1 was 2.38L, which is 58% of predicted; normal flow volume loop. PFTs were performed to evaluate for SOB, asthma  Full PFT reveals mild-moderate obstructive dysfunction; FEV1 was 1.64L which is 66% of predicted; normal lung volumes; normal diffusion at 16.6, which is 95% of predicted; normal flow volume loop. PFTs were performed to evaluate for SOB, asthma  FENO was 25; a normal value being less than 25 Fractional exhaled nitric oxide (FENO) is regarded as a simple, noninvasive method for assessing eosinophilic airway inflammation. Produced by a variety of cells within the lung, nitric oxide (NO) concentrations are generally low in healthy individuals. However, high concentrations of NO appear to be involved in nonspecific host defense mechanisms and chronic inflammatory diseases such as asthma. The American Thoracic Society (ATS) therefore has recommended using FENO to aid in the diagnosis and monitoring of eosinophilic airway inflammation and asthma, and for identifying steroid responsive individuals whose chronic respiratory symptoms may be caused by airway inflammation.

## 2024-03-27 ENCOUNTER — NON-APPOINTMENT (OUTPATIENT)
Age: 79
End: 2024-03-27

## 2024-03-27 RX ORDER — SILODOSIN 8 MG/1
8 CAPSULE ORAL
Qty: 90 | Refills: 3 | Status: ACTIVE | COMMUNITY
Start: 2024-03-27 | End: 1900-01-01

## 2024-04-03 ENCOUNTER — NON-APPOINTMENT (OUTPATIENT)
Age: 79
End: 2024-04-03

## 2024-04-20 ENCOUNTER — OUTPATIENT (OUTPATIENT)
Dept: OUTPATIENT SERVICES | Facility: HOSPITAL | Age: 79
LOS: 1 days | End: 2024-04-20
Payer: MEDICARE

## 2024-04-20 ENCOUNTER — APPOINTMENT (OUTPATIENT)
Dept: MRI IMAGING | Facility: HOSPITAL | Age: 79
End: 2024-04-20
Payer: MEDICARE

## 2024-04-20 DIAGNOSIS — Z98.49 CATARACT EXTRACTION STATUS, UNSPECIFIED EYE: Chronic | ICD-10-CM

## 2024-04-20 DIAGNOSIS — J32.4 CHRONIC PANSINUSITIS: ICD-10-CM

## 2024-04-20 DIAGNOSIS — Z96.652 PRESENCE OF LEFT ARTIFICIAL KNEE JOINT: Chronic | ICD-10-CM

## 2024-04-20 DIAGNOSIS — Z96.651 PRESENCE OF RIGHT ARTIFICIAL KNEE JOINT: Chronic | ICD-10-CM

## 2024-04-20 DIAGNOSIS — Z98.890 OTHER SPECIFIED POSTPROCEDURAL STATES: Chronic | ICD-10-CM

## 2024-04-20 PROCEDURE — 70543 MRI ORBT/FAC/NCK W/O &W/DYE: CPT | Mod: 26,MH

## 2024-04-20 PROCEDURE — A9579: CPT

## 2024-04-20 PROCEDURE — 70543 MRI ORBT/FAC/NCK W/O &W/DYE: CPT

## 2024-04-22 ENCOUNTER — NON-APPOINTMENT (OUTPATIENT)
Age: 79
End: 2024-04-22

## 2024-04-25 ENCOUNTER — APPOINTMENT (OUTPATIENT)
Dept: OTOLARYNGOLOGY | Facility: CLINIC | Age: 79
End: 2024-04-25

## 2024-06-30 NOTE — H&P PST ADULT - SKIN/BREAST
Kaylyn Payton is a 18 y.o., , who had a Vaginal, Spontaneous  delivery on 2024  at 37w1d and is now POD1.    She had Neuraxial Anesthesia without immediate complications noted.       Pain well controlled    Vitals:    24 1114   BP: 115/65   Pulse: 76   Resp: 15   Temp: 36.1 °C (97 °F)   SpO2: 96%       Neuraxial site assessed. No visible redness or swelling or drainage. Patient able to ambulate and move all extremities without difficulty. Able to void. No complaints of nausea/vomiting. Tolerating PO intake well. No s/sx of PDPH.     Anesthesia will sign off     Ok Jo MD     negative

## 2024-07-02 ENCOUNTER — APPOINTMENT (OUTPATIENT)
Dept: UROLOGY | Facility: CLINIC | Age: 79
End: 2024-07-02

## 2024-08-13 ENCOUNTER — APPOINTMENT (OUTPATIENT)
Dept: ORTHOPEDIC SURGERY | Facility: CLINIC | Age: 79
End: 2024-08-13
Payer: MEDICARE

## 2024-08-13 VITALS — DIASTOLIC BLOOD PRESSURE: 73 MMHG | HEART RATE: 67 BPM | SYSTOLIC BLOOD PRESSURE: 121 MMHG

## 2024-08-13 DIAGNOSIS — Z96.651 PRESENCE OF RIGHT ARTIFICIAL KNEE JOINT: ICD-10-CM

## 2024-08-13 DIAGNOSIS — Z96.652 PRESENCE OF LEFT ARTIFICIAL KNEE JOINT: ICD-10-CM

## 2024-08-13 PROCEDURE — 73562 X-RAY EXAM OF KNEE 3: CPT | Mod: 50

## 2024-08-13 PROCEDURE — 99213 OFFICE O/P EST LOW 20 MIN: CPT

## 2024-08-13 NOTE — PHYSICAL EXAM
[UE/LE] : Sensory: Intact in bilateral upper & lower extremities [ALL] : dorsalis pedis, posterior tibial, femoral, popliteal, and radial 2+ and symmetric bilaterally [Normal RUE] : Right Upper Extremity: No scars, rashes, lesions, ulcers, skin intact [Normal LUE] : Left Upper Extremity: No scars, rashes, lesions, ulcers, skin intact [Normal RLE] : Right Lower Extremity: No scars, rashes, lesions, ulcers, skin intact [Normal LLE] : Left Lower Extremity: No scars, rashes, lesions, ulcers, skin intact [Normal] : No costovertebral angle tenderness and no spinal tenderness [de-identified] : Grossly intact [de-identified] : Well-healed incision to the right and left knee without erythema, drainage, or evidence of cellulitis.  No effusion.  No swelling.  Less than 5 degree laxity with varus valgus stresses bilaterally.  Negative anterior posterior drawer bilaterally.  No extension lag bilaterally.  No flexion contractures bilaterally.  Range of motion 0-1 35 bilaterally.  Calves are soft, nontender, no evidence of DVT at this time.  Patient is good motor and sensory to both legs. [de-identified] : Right and left total knee replacement, in anatomical alignment, excellent bone to prosthesis interface, there is no gross evidence of prosthetic failure, all 3 components both knees are anatomically aligned.

## 2024-08-13 NOTE — HISTORY OF PRESENT ILLNESS
[de-identified] : Left TKR 3/2/23, Right TKR 8/21/19 by  The patient is an 79-year-old gentleman who presents today for both of his knees.  He status post a left total knee replacement done by me recently, and a right total knee replacement done by Dr. Erazo in 2019.  Patient states he feels great.  He is extremely active and that he frequently hikes.  Patient denies any specific swelling or pain.  He does complain intermittently after long walks some numbness in both of his knees. [Stable] : stable [0] : a maximum pain level of 0/10 [Intermit.] : ~He/She~ states the symptoms seem to be intermittent [Walking] : worsened by walking [Recumbency] : relieved by recumbency [Rest] : relieved by rest [Ataxia] : no ataxia [Incontinence] : no incontinence [Loss of Dexterity] : good dexterity [Urinary Ret.] : no urinary retention

## 2024-08-13 NOTE — DISCUSSION/SUMMARY
[Medication Risks Reviewed] : Medication risks reviewed [de-identified] : The patient is status post bilateral total knee replacements.  According to the physical exam and radiographs there are no significant periprosthetic issues.  Patient is doing very well.  He will continue an exercise program of walking strength training.  He was reminded of antibiotics for dental prophylaxis.  All of his questions were thoroughly answered to his satisfaction.

## 2024-09-11 ENCOUNTER — APPOINTMENT (OUTPATIENT)
Dept: ORTHOPEDIC SURGERY | Facility: CLINIC | Age: 79
End: 2024-09-11
Payer: MEDICARE

## 2024-09-11 VITALS — DIASTOLIC BLOOD PRESSURE: 81 MMHG | HEART RATE: 59 BPM | SYSTOLIC BLOOD PRESSURE: 131 MMHG

## 2024-09-11 DIAGNOSIS — M51.26 OTHER INTERVERTEBRAL DISC DISPLACEMENT, LUMBAR REGION: ICD-10-CM

## 2024-09-11 DIAGNOSIS — M51.37 OTHER INTERVERTEBRAL DISC DEGENERATION, LUMBOSACRAL REGION: ICD-10-CM

## 2024-09-11 PROCEDURE — 72110 X-RAY EXAM L-2 SPINE 4/>VWS: CPT

## 2024-09-11 PROCEDURE — 99214 OFFICE O/P EST MOD 30 MIN: CPT

## 2024-09-11 NOTE — DISCUSSION/SUMMARY
[2 Weeks] : in 2 weeks [de-identified] :  I have discussed the underlying pathophysiology of the patient's condition.  I expressed to the patient at this time, it would be in their best interest to obtain additional studies in the form of an MRI due to the worsening right lower extremity radicular complaints. I provided the patient with necessary prescription to obtain an MRI. The patient should follow up with in 1-2 weeks to review the results of the MRI & further assessment of his symptoms.

## 2024-09-11 NOTE — HISTORY OF PRESENT ILLNESS
[8] : a minimum pain level of 8/10 [9] : a maximum pain level of 9/10 [de-identified] :  Patients presents for a follow up visit for low back pain. The patient states that his pain has increased significantly since increased since his last visit. He noes contralateral back pain, right side greater then left began about 2-3 weeks ago. He notes radiating pain down to bilateral legs and knees. He notes that he remains to stay active and pain increases after he exercises. He states that the pain is worse in the morning and improves throughout the day.

## 2024-09-11 NOTE — ADDENDUM
[FreeTextEntry1] :  I, Saritha Nietod, acted solely as a scribe for Dr. Ayden Vasquez on this date 09/11/2024 .  All medical records entries made by the Scribe were at my Dr. Ayden Vasquez direction and personally dictated by me on 09/11/2024. I have reviewed the chart and agree that the record accurately reflects my personal performance of the history, physical exam, assessment and plan. I have also personally directed, reviewed, and agreed with the chart.

## 2024-09-11 NOTE — PHYSICAL EXAM
[LE] : Sensory: Intact in bilateral lower extremities [Normal] : Oriented to person, place, and time, insight and judgement were intact and the affect was normal [Normal RLE] : Right Lower Extremity: No scars, rashes, lesions, ulcers, skin intact [Normal LLE] : Left Lower Extremity: No scars, rashes, lesions, ulcers, skin intact [de-identified] :  AP & lateral Flexion and Extension X-Rays of the Lumbar spine taken 09/11/2024 shows midlevel degenerative changes at the L2-3 flexion and extension show no gross instability compared 02/2023 the disc degeneration of L2-3 is worse.

## 2024-09-12 ENCOUNTER — APPOINTMENT (OUTPATIENT)
Dept: MRI IMAGING | Facility: IMAGING CENTER | Age: 79
End: 2024-09-12
Payer: MEDICARE

## 2024-09-12 ENCOUNTER — OUTPATIENT (OUTPATIENT)
Dept: OUTPATIENT SERVICES | Facility: HOSPITAL | Age: 79
LOS: 1 days | End: 2024-09-12
Payer: MEDICARE

## 2024-09-12 DIAGNOSIS — S33.5XXA SPRAIN OF LIGAMENTS OF LUMBAR SPINE, INITIAL ENCOUNTER: ICD-10-CM

## 2024-09-12 DIAGNOSIS — M51.26 OTHER INTERVERTEBRAL DISC DISPLACEMENT, LUMBAR REGION: ICD-10-CM

## 2024-09-12 DIAGNOSIS — Z98.49 CATARACT EXTRACTION STATUS, UNSPECIFIED EYE: Chronic | ICD-10-CM

## 2024-09-12 DIAGNOSIS — Z96.652 PRESENCE OF LEFT ARTIFICIAL KNEE JOINT: Chronic | ICD-10-CM

## 2024-09-12 DIAGNOSIS — M51.9 UNSPECIFIED THORACIC, THORACOLUMBAR AND LUMBOSACRAL INTERVERTEBRAL DISC DISORDER: ICD-10-CM

## 2024-09-12 PROCEDURE — 72148 MRI LUMBAR SPINE W/O DYE: CPT | Mod: 26,MH

## 2024-09-12 PROCEDURE — 72148 MRI LUMBAR SPINE W/O DYE: CPT

## 2024-09-20 ENCOUNTER — APPOINTMENT (OUTPATIENT)
Dept: PULMONOLOGY | Facility: CLINIC | Age: 79
End: 2024-09-20
Payer: MEDICARE

## 2024-09-20 VITALS
HEART RATE: 65 BPM | BODY MASS INDEX: 27.31 KG/M2 | RESPIRATION RATE: 16 BRPM | DIASTOLIC BLOOD PRESSURE: 70 MMHG | WEIGHT: 174 LBS | HEIGHT: 67 IN | TEMPERATURE: 97.6 F | OXYGEN SATURATION: 98 % | SYSTOLIC BLOOD PRESSURE: 126 MMHG

## 2024-09-20 DIAGNOSIS — R76.8 OTHER SPECIFIED ABNORMAL IMMUNOLOGICAL FINDINGS IN SERUM: ICD-10-CM

## 2024-09-20 DIAGNOSIS — J30.9 ALLERGIC RHINITIS, UNSPECIFIED: ICD-10-CM

## 2024-09-20 DIAGNOSIS — J45.909 UNSPECIFIED ASTHMA, UNCOMPLICATED: ICD-10-CM

## 2024-09-20 DIAGNOSIS — K21.9 GASTRO-ESOPHAGEAL REFLUX DISEASE W/OUT ESOPHAGITIS: ICD-10-CM

## 2024-09-20 DIAGNOSIS — J45.50 SEVERE PERSISTENT ASTHMA, UNCOMPLICATED: ICD-10-CM

## 2024-09-20 DIAGNOSIS — R06.02 SHORTNESS OF BREATH: ICD-10-CM

## 2024-09-20 PROCEDURE — 94010 BREATHING CAPACITY TEST: CPT

## 2024-09-20 PROCEDURE — 94727 GAS DIL/WSHOT DETER LNG VOL: CPT

## 2024-09-20 PROCEDURE — 94729 DIFFUSING CAPACITY: CPT

## 2024-09-20 PROCEDURE — 99214 OFFICE O/P EST MOD 30 MIN: CPT | Mod: 25

## 2024-09-20 PROCEDURE — 95012 NITRIC OXIDE EXP GAS DETER: CPT

## 2024-09-20 NOTE — ADDENDUM
[FreeTextEntry1] : Documented by Harry Jessica acting as a scribe for Dr. Eron Johnson on 09/20/2024 All medical record entries made by the Scribe were at my, Dr. Eron Johnson's, direction and personally dictated by me on 09/20/2024 . I have reviewed the chart and agree that the record accurately reflects my personal performance of the history, physical exam, assessment and plan. I have also personally directed, reviewed, and agree with the discharge instructions.

## 2024-09-20 NOTE — HISTORY OF PRESENT ILLNESS
[FreeTextEntry1] : Mr. Rogers is a 79 year old male with a history of asthma, GERD, SOB, and vitamin D deficiency presenting today for a follow up pulmonary evaluation. His chief complaint is   -he notes recent successful nasal surgery 8/1/2024  -he notes controlled heartburn/reflux w/ Pantoprazole  -he notes interrupted sleep -he notes exercising (walks 5 miles) -he notes stable breathing post-surgery  -he notes fungus found in nose during surgery - given antibiotics and steroids  -he notes weight is stable -he notes his senses of smell and taste are stable -he denies dysphonia -he notes bowels are regular -he denies antibiotics affecting bowels -he notes energy levels are good (7-8/10) -he denies taking any new medications, vitamins, or supplements -he denies wheezing -he notes balance is stable -he notes vision is stable -he notes hearing is stable  -he denies snoring -he denies taking vaccines -he notes upcoming trip to [place] for 2 weeks and [facility] for 3 months    -he denies any headaches, nausea, emesis, fever, chills, sweats, chest pain, chest pressure, coughing, wheezing, palpitations, diarrhea, constipation, dysphagia, vertigo, arthralgias, myalgias, leg swelling, itchy eyes, itchy ears, heartburn, reflux, or sour taste in the mouth.

## 2024-09-20 NOTE — HISTORY OF PRESENT ILLNESS
[FreeTextEntry1] : Mr. Rogers is a 79 year old male with a history of asthma, GERD, SOB, and vitamin D deficiency presenting today for a follow up pulmonary evaluation. His chief complaint is   -he notes recent successful nasal surgery 8/1/2024  -he notes controlled heartburn/reflux w/ Pantoprazole  -he notes interrupted sleep -he notes exercising (walks 5 miles) -he notes stable breathing post-surgery  -he notes fungus found in nose during surgery - given antibiotics and steroids  -he notes weight is stable -he notes his senses of smell and taste are stable -he denies dysphonia -he notes bowels are regular -he denies antibiotics affecting bowels -he notes energy levels are good (7-8/10) -he denies taking any new medications, vitamins, or supplements -he denies wheezing -he notes balance is stable -he notes vision is stable -he notes hearing is stable  -he denies snoring -he denies taking vaccines -he notes upcoming trip to [place] for 2 weeks and [facility] for 3 months    -he denies any headaches, nausea, emesis, fever, chills, sweats, chest pain, chest pressure, coughing, wheezing, palpitations, diarrhea, constipation, dysphagia, vertigo, arthralgias, myalgias, leg swelling, itchy eyes, itchy ears, heartburn, reflux, or sour taste in the mouth.

## 2024-09-20 NOTE — PROCEDURE
[FreeTextEntry1] : Full PFT reveals mild obstructive dysfunction at mid-low volumes; FEV1 was 1.63L which is 65% of predicted; hyperinflated  lung volumes; low-normal diffusion at 15.34, which is 69% of predicted; normal flow volume loop. PFTs were performed to evaluate for asthma  FENO was 12; a normal value being less than 25 Fractional exhaled nitric oxide (FENO) is regarded as a simple, noninvasive method for assessing eosinophilic airway inflammation. Produced by a variety of cells within the lung, nitric oxide (NO) concentrations are generally low in healthy individuals. However, high concentrations of NO appear to be involved in nonspecific host defense mechanisms and chronic inflammatory diseases such as asthma. The American Thoracic Society (ATS) therefore has recommended using FENO to aid in the diagnosis and monitoring of eosinophilic airway inflammation and asthma, and for identifying steroid responsive individuals whose chronic respiratory symptoms may be caused by airway inflammation.

## 2024-09-20 NOTE — ASSESSMENT
[FreeTextEntry1] : Mr. LIVINGSTON is a 79 year old male with a history of nonsmoker, benign familial tremor, BPH, chronic sinus issues, GERD, OA, low vitamin D, lumbar disc disease who now comes to the office for a follow up pulmonary evaluation for asthma, allergy, and GERD.- noncompliant - #1 s/p sinus surgery (successful)  His shortness of breath is multifactorial due to: -poor mechanics of breathing  -out of shape / overweight -pulmonary disease      -Severe persistent asthma -cardiac disease   problem 1: Severe Persistent Asthma (noncompliant)- semi controlled  -s/p Prednisone 20 mg x 7 days, 10 mg x 7 days 12/2021 Information sheet given to the patient to be reviewed, this medication is never to be used without consulting the prescribing physician. Proper dietary restraint is necessary specifically salt containing foods, if any reaction may occur should be reported.  -continue Singulair 10 mg QHS (NC) -continue Symbicort (160) 2 inhalations BID (generic) (NC) = (from [facility]) -continue Ventolin 2 puffs Q6H, pre-exercise - Asthma is believed to be caused by inherited (genetic) and environmental factor, but its exact cause is unknown. Asthma may be triggered by allergens, lung infections, or irritants in the air. Asthma triggers are different for each person   Problem 1A: Elevated IgE -Patient refused Xolair  -Xolair is a recombinant DNA- derived humanized IgG1K monoclonal antibody that selectively binds to human immunoglobulin E (IgE). Xolair is produced by a Chinese hamster ovary cell suspension culture in nutrient medium containing the antibiotic gentamicin. Gentamicin is not detectable in the final product. Xolair is a sterile, white, preservative free, lyophilized powder contained in a single use vial that is reconstituted with sterile water for suspension. Side effects include: wheezing, tightness of the chest, trouble breathing, hives, skin rash, feeling anxious or light-headed, fainting, warmth or tingling under skin, or swelling of face, lips, or tongue   Problem 2: allergies/sinus- controlled - continue Flonase 1 sniff/nostril BID/ - Astelin 0.15% 1 sniff/nostril BID  = Dymista 1 BID -continue Clarinex 5 mg qHS  - continue Singulair 10 mg QHS -continue Atrovent 0.6% at 1 sniff/nostril, up to TID  - Recommended Navage - Continue Clarinex 5 mg before bed  - s/p Blood work to include: asthma panel (+), food IgE panel (+), IgE level (+: 117), eosinophil level (wnl), vitamin D level (wnl) - Environmental measures for allergies were encouraged including mattress and pillow covers, air purifier, and environmental controls.  Problem 3: GERD (Homeopathic treatment) - Constantino (controlled) - transition from Protonix 40 mg QAM, pre-breakfast to Pepcid 40 mg QHS - Recommended organic apple cider vinegar tablets  -Kefir yogurt, hold on supplements.  -Rule of 2s: avoid eating too much, eating too late, eating too spicy, eating two hours before bed. -Things to avoid including overeating, spicy foods, tight clothing, eating within three hours of bed, this list is not all inclusive.  -For treatment of reflux, possible options discussed including diet control, H2 blockers, PPIs, as well as coating motility agents discussed as treatment options. Timing of meals and proximity of last meal to sleep were discussed. If symptoms persist, a formal gastrointestinal evaluation is needed.   problem 4: cardiac disease -recommended to continue to follow up with Cardiologist ([facility])  problem 5: poor breathing mechanics - Recommended Angel Gudino and Britta techniques  -Proper breathing techniques were reviewed with an emphasis of exhalation. Patient instructed to breath in for 1 second and out for four seconds. Patient was encouraged to not talk while walking.   problem 6: out of shape / overweight -Recommended Alberto Garrett's 10-day detox diet and book.  -Weight loss, exercise, and diet control were discussed and are highly encouraged. Treatment options were given such as, aqua therapy, and contacting a nutritionist. Recommended to use the elliptical, stationary bike, less use of treadmill. Mindful eating was explained to the patient Obesity is associated with worsening asthma, shortness of breath, and potential for cardiac disease, diabetes, and other underlying medical conditions  Problem 7a: COVID-19 precautionary Immune Support Recommendations: s/p Moderna vaccine x3  OTC Vitamin C 500mg BID  -OTC Quercetin 250-500mg BID  -OTC Zinc 75-100mg per day  -OTC Melatonin 1 or 2mg a night  -OTC Vitamin D 1-4000mg per day  -OTC Tonic Water 8oz per day -Water 0.5-1 gallon per day  problem 7: health maintenance  - s/p COVID-19 vax x3  -Recommended not to get an additional COVID-19 booster at this time until it is updated against the current variants. If planning on travelling, obtaining another booster within 2 weeks of departure is recommended.  - Discussed and recommended to wait for the updated booster  -recommended yearly flu shot - denied  -recommended strep pneumonia vaccines: Prevnar-13 vaccine (2019), followed by Pneumo vaccine 23 one year following (9/2020) -recommended early intervention for Upper Respiratory Infections (URIs) -recommended regular osteoporosis evaluations -recommended early dermatological evaluations -recommended after the age of 50 to the age of 70, colonoscopy every 5 years  F/P in 4-6 months  He is encouraged to call with any changes, concerns, or questions.

## 2024-09-25 ENCOUNTER — APPOINTMENT (OUTPATIENT)
Dept: PULMONOLOGY | Facility: CLINIC | Age: 79
End: 2024-09-25

## 2024-09-30 ENCOUNTER — APPOINTMENT (OUTPATIENT)
Dept: PAIN MANAGEMENT | Facility: CLINIC | Age: 79
End: 2024-09-30
Payer: MEDICARE

## 2024-09-30 DIAGNOSIS — M54.16 RADICULOPATHY, LUMBAR REGION: ICD-10-CM

## 2024-09-30 PROCEDURE — 99214 OFFICE O/P EST MOD 30 MIN: CPT

## 2024-09-30 NOTE — HISTORY OF PRESENT ILLNESS
[Lower back] : lower back [Gradual] : gradual [9] : 9 [3] : 3 [Radiating] : radiating [Sharp] : sharp [Constant] : constant [Household chores] : household chores [Work] : work [Sleep] : sleep [Rest] : rest [Heat] : heat [Sitting] : sitting [Standing] : standing [Walking] : walking [Retired] : Work status: retired [Home] : at home, [unfilled] , at the time of the visit. [Medical Office: ([facility])___] : at the medical office located in  [Verbal consent obtained from patient] : the patient, [unfilled] [] : no [FreeTextEntry7] : RT SIDE  LOWER BACK TO RT KNEE  [de-identified] : 10/25/2023 [de-identified] : MRI 2023-9-26/ CERVICAL & LUMBAR  [de-identified] : PT ATTENDS CHIROPRACTIC AND PHYSICAL THERAPY TREATMENT

## 2024-09-30 NOTE — ASSESSMENT
[FreeTextEntry1] : Interim history The patient returns with pain that has been treated adequately in the past with epidural steroid injections.  The patient's complaints are consistent with radiculopathy. Patient's ADL's increase with prior ANJUM.   The last injection gave greater than 60% reduction of the pain but now there is a return of symptoms. The patient is requesting a subsequent epidural steroid injection to alleviate pain and improve functional ability and quality of life.  Patient is a candidate. Objective findings Since the last visit, there have been no new imaging studies. The patient has no new symptoms except the return of the their pain complaints. Motor and sensory function is unchanged. Plan Spoke to patient about epidural steroid injections. Explained risks, benefits and alternatives including but not limited to the risk of infection, bleeding, headache, syncopal episode, failure to resolve issues, allergic reaction, symptom recurrence, allergic reaction, nerve injury, and increased pain. The patient understands the risks. All questions were answered. The patient is willing to proceed.  This note was generated by using Dragon medical dictation software.  A reasonable effort has been made for proofreading its contents, but typos may still remain.  If there are any questions or points of clarification needed, please notify my office.

## 2024-10-02 ENCOUNTER — TRANSCRIPTION ENCOUNTER (OUTPATIENT)
Age: 79
End: 2024-10-02

## 2024-10-03 ENCOUNTER — APPOINTMENT (OUTPATIENT)
Dept: ORTHOPEDIC SURGERY | Facility: HOSPITAL | Age: 79
End: 2024-10-03

## 2024-10-08 NOTE — PATIENT PROFILE ADULT - HEALTH LITERACY
Dear Dari,  Always good to meet with you and see how well you are doing keeping your blood sugar at goal.  As your fasting blood sugars are at goal, I do not think that you need to increase you NPH dose, but if high again today after dinner or in coming days or >95 fasting in the morning, please do go ahead and increase by 2 units each time that you see this as long as does not lead to any low blood sugars <70.    Please try to add in a high fiber carbohydrate snack right after work so that hopefully you can have a bit less carb at dinner.  Please also consider a short walk or some brisk house cleaning, even just 10 -15 minutes after dinner each evening to help with blood sugar after dinner.  I would also encourage you to work on adding in some fun physical activity on the weekends.  A walk or bike ride or other activity that you enjoy on the weekends will make a very positive difference in your health.      Please reach out with any questions or concerns.    My best wishes,    Lulu Lara PA-C, MPAS  [facility]  Diabetes, Endocrinology, and Metabolism  [phone] Appointments/Nurse  [phone] Fax             no

## 2024-10-17 ENCOUNTER — APPOINTMENT (OUTPATIENT)
Dept: ULTRASOUND IMAGING | Facility: CLINIC | Age: 79
End: 2024-10-17
Payer: MEDICARE

## 2024-10-17 PROCEDURE — 76700 US EXAM ABDOM COMPLETE: CPT

## 2024-12-10 ENCOUNTER — APPOINTMENT (OUTPATIENT)
Dept: CARDIOLOGY | Facility: CLINIC | Age: 79
End: 2024-12-10
Payer: MEDICARE

## 2024-12-10 ENCOUNTER — APPOINTMENT (OUTPATIENT)
Dept: CARDIOLOGY | Facility: CLINIC | Age: 79
End: 2024-12-10

## 2024-12-10 ENCOUNTER — NON-APPOINTMENT (OUTPATIENT)
Age: 79
End: 2024-12-10

## 2024-12-10 VITALS
OXYGEN SATURATION: 98 % | BODY MASS INDEX: 27.15 KG/M2 | WEIGHT: 173 LBS | SYSTOLIC BLOOD PRESSURE: 133 MMHG | HEIGHT: 67 IN | DIASTOLIC BLOOD PRESSURE: 70 MMHG | HEART RATE: 68 BPM

## 2024-12-10 DIAGNOSIS — I49.3 VENTRICULAR PREMATURE DEPOLARIZATION: ICD-10-CM

## 2024-12-10 DIAGNOSIS — R07.89 OTHER CHEST PAIN: ICD-10-CM

## 2024-12-10 DIAGNOSIS — I25.10 ATHEROSCLEROTIC HEART DISEASE OF NATIVE CORONARY ARTERY W/OUT ANGINA PECTORIS: ICD-10-CM

## 2024-12-10 PROCEDURE — 99215 OFFICE O/P EST HI 40 MIN: CPT

## 2024-12-10 PROCEDURE — 93242 EXT ECG>48HR<7D RECORDING: CPT

## 2024-12-10 PROCEDURE — 93000 ELECTROCARDIOGRAM COMPLETE: CPT | Mod: 59

## 2024-12-10 RX ORDER — FLUTICASONE PROPIONATE 50 UG/1
50 SPRAY, METERED NASAL
Refills: 0 | Status: ACTIVE | COMMUNITY

## 2024-12-10 RX ORDER — PREDNISONE 50 MG/1
50 TABLET ORAL
Qty: 3 | Refills: 0 | Status: ACTIVE | COMMUNITY
Start: 2024-12-10 | End: 1900-01-01

## 2024-12-10 RX ORDER — THEOPHYLLINE 80 MG/15ML
SOLUTION ORAL
Refills: 0 | Status: ACTIVE | COMMUNITY

## 2024-12-13 LAB
25(OH)D3 SERPL-MCNC: 21.9 NG/ML
ALBUMIN SERPL ELPH-MCNC: 3.9 G/DL
ALP BLD-CCNC: 90 U/L
ALT SERPL-CCNC: 14 U/L
ANION GAP SERPL CALC-SCNC: 10 MMOL/L
AST SERPL-CCNC: 17 U/L
BILIRUB SERPL-MCNC: 0.6 MG/DL
BUN SERPL-MCNC: 13 MG/DL
CALCIUM SERPL-MCNC: 8.6 MG/DL
CHLORIDE SERPL-SCNC: 106 MMOL/L
CHOLEST SERPL-MCNC: 186 MG/DL
CO2 SERPL-SCNC: 25 MMOL/L
CREAT SERPL-MCNC: 1.15 MG/DL
EGFR: 64 ML/MIN/1.73M2
ESTIMATED AVERAGE GLUCOSE: 120 MG/DL
GLUCOSE SERPL-MCNC: 95 MG/DL
HBA1C MFR BLD HPLC: 5.8 %
HCT VFR BLD CALC: 41.5 %
HDLC SERPL-MCNC: 40 MG/DL
HGB BLD-MCNC: 13.2 G/DL
LDLC SERPL CALC-MCNC: 128 MG/DL
MCHC RBC-ENTMCNC: 29.3 PG
MCHC RBC-ENTMCNC: 31.8 G/DL
MCV RBC AUTO: 92.2 FL
NONHDLC SERPL-MCNC: 146 MG/DL
PLATELET # BLD AUTO: 125 K/UL
POTASSIUM SERPL-SCNC: 4.5 MMOL/L
PROT SERPL-MCNC: 6.6 G/DL
PSA SERPL-MCNC: 0.9 NG/ML
RBC # BLD: 4.5 M/UL
RBC # FLD: 14.7 %
SODIUM SERPL-SCNC: 141 MMOL/L
TRIGL SERPL-MCNC: 100 MG/DL
TSH SERPL-ACNC: 2.05 UIU/ML
WBC # FLD AUTO: 5.23 K/UL

## 2024-12-23 ENCOUNTER — APPOINTMENT (OUTPATIENT)
Dept: PULMONOLOGY | Facility: CLINIC | Age: 79
End: 2024-12-23
Payer: MEDICARE

## 2024-12-23 VITALS
WEIGHT: 173 LBS | DIASTOLIC BLOOD PRESSURE: 78 MMHG | OXYGEN SATURATION: 97 % | HEIGHT: 67 IN | TEMPERATURE: 97.6 F | HEART RATE: 76 BPM | RESPIRATION RATE: 16 BRPM | BODY MASS INDEX: 27.15 KG/M2 | SYSTOLIC BLOOD PRESSURE: 128 MMHG

## 2024-12-23 DIAGNOSIS — R09.82 POSTNASAL DRIP: ICD-10-CM

## 2024-12-23 DIAGNOSIS — J30.9 ALLERGIC RHINITIS, UNSPECIFIED: ICD-10-CM

## 2024-12-23 DIAGNOSIS — K21.9 GASTRO-ESOPHAGEAL REFLUX DISEASE W/OUT ESOPHAGITIS: ICD-10-CM

## 2024-12-23 DIAGNOSIS — J31.0 CHRONIC RHINITIS: ICD-10-CM

## 2024-12-23 DIAGNOSIS — J45.50 SEVERE PERSISTENT ASTHMA, UNCOMPLICATED: ICD-10-CM

## 2024-12-23 PROCEDURE — 99214 OFFICE O/P EST MOD 30 MIN: CPT

## 2024-12-24 PROCEDURE — 93244 EXT ECG>48HR<7D REV&INTERPJ: CPT

## 2024-12-31 ENCOUNTER — APPOINTMENT (OUTPATIENT)
Dept: CT IMAGING | Facility: CLINIC | Age: 79
End: 2024-12-31
Payer: MEDICARE

## 2024-12-31 ENCOUNTER — OUTPATIENT (OUTPATIENT)
Dept: OUTPATIENT SERVICES | Facility: HOSPITAL | Age: 79
LOS: 1 days | End: 2024-12-31
Payer: MEDICARE

## 2024-12-31 DIAGNOSIS — I25.10 ATHEROSCLEROTIC HEART DISEASE OF NATIVE CORONARY ARTERY WITHOUT ANGINA PECTORIS: ICD-10-CM

## 2024-12-31 DIAGNOSIS — Z98.890 OTHER SPECIFIED POSTPROCEDURAL STATES: Chronic | ICD-10-CM

## 2024-12-31 DIAGNOSIS — R07.89 OTHER CHEST PAIN: ICD-10-CM

## 2024-12-31 DIAGNOSIS — Z96.651 PRESENCE OF RIGHT ARTIFICIAL KNEE JOINT: Chronic | ICD-10-CM

## 2024-12-31 DIAGNOSIS — I49.3 VENTRICULAR PREMATURE DEPOLARIZATION: ICD-10-CM

## 2024-12-31 DIAGNOSIS — Z98.49 CATARACT EXTRACTION STATUS, UNSPECIFIED EYE: Chronic | ICD-10-CM

## 2024-12-31 DIAGNOSIS — Z96.652 PRESENCE OF LEFT ARTIFICIAL KNEE JOINT: Chronic | ICD-10-CM

## 2024-12-31 PROCEDURE — 75574 CT ANGIO HRT W/3D IMAGE: CPT | Mod: 26,MH

## 2024-12-31 PROCEDURE — 75574 CT ANGIO HRT W/3D IMAGE: CPT

## 2025-01-02 ENCOUNTER — NON-APPOINTMENT (OUTPATIENT)
Age: 80
End: 2025-01-02

## 2025-01-10 ENCOUNTER — APPOINTMENT (OUTPATIENT)
Dept: CARDIOLOGY | Facility: CLINIC | Age: 80
End: 2025-01-10
Payer: MEDICARE

## 2025-01-10 ENCOUNTER — NON-APPOINTMENT (OUTPATIENT)
Age: 80
End: 2025-01-10

## 2025-01-10 VITALS
BODY MASS INDEX: 26.68 KG/M2 | OXYGEN SATURATION: 97 % | WEIGHT: 170 LBS | HEIGHT: 67 IN | SYSTOLIC BLOOD PRESSURE: 120 MMHG | HEART RATE: 72 BPM | DIASTOLIC BLOOD PRESSURE: 70 MMHG

## 2025-01-10 DIAGNOSIS — R07.89 OTHER CHEST PAIN: ICD-10-CM

## 2025-01-10 DIAGNOSIS — I25.10 ATHEROSCLEROTIC HEART DISEASE OF NATIVE CORONARY ARTERY W/OUT ANGINA PECTORIS: ICD-10-CM

## 2025-01-10 PROCEDURE — 99213 OFFICE O/P EST LOW 20 MIN: CPT

## 2025-01-10 PROCEDURE — 93000 ELECTROCARDIOGRAM COMPLETE: CPT

## 2025-01-15 ENCOUNTER — NON-APPOINTMENT (OUTPATIENT)
Age: 80
End: 2025-01-15

## 2025-04-04 ENCOUNTER — NON-APPOINTMENT (OUTPATIENT)
Age: 80
End: 2025-04-04

## 2025-04-04 ENCOUNTER — APPOINTMENT (OUTPATIENT)
Dept: PULMONOLOGY | Facility: CLINIC | Age: 80
End: 2025-04-04

## 2025-04-04 VITALS
OXYGEN SATURATION: 96 % | DIASTOLIC BLOOD PRESSURE: 60 MMHG | HEIGHT: 67 IN | BODY MASS INDEX: 27 KG/M2 | HEART RATE: 74 BPM | TEMPERATURE: 97.5 F | RESPIRATION RATE: 16 BRPM | SYSTOLIC BLOOD PRESSURE: 100 MMHG | WEIGHT: 172 LBS

## 2025-04-04 DIAGNOSIS — R06.02 SHORTNESS OF BREATH: ICD-10-CM

## 2025-04-04 DIAGNOSIS — J45.909 UNSPECIFIED ASTHMA, UNCOMPLICATED: ICD-10-CM

## 2025-04-04 DIAGNOSIS — M54.2 CERVICALGIA: ICD-10-CM

## 2025-04-04 DIAGNOSIS — J45.50 SEVERE PERSISTENT ASTHMA, UNCOMPLICATED: ICD-10-CM

## 2025-04-04 DIAGNOSIS — J30.9 ALLERGIC RHINITIS, UNSPECIFIED: ICD-10-CM

## 2025-04-04 DIAGNOSIS — R93.89 ABNORMAL FINDINGS ON DIAGNOSTIC IMAGING OF OTHER SPECIFIED BODY STRUCTURES: ICD-10-CM

## 2025-04-04 DIAGNOSIS — M54.16 RADICULOPATHY, LUMBAR REGION: ICD-10-CM

## 2025-04-04 DIAGNOSIS — G89.11 LOW BACK PAIN, UNSPECIFIED: ICD-10-CM

## 2025-04-04 DIAGNOSIS — M54.50 LOW BACK PAIN, UNSPECIFIED: ICD-10-CM

## 2025-04-04 DIAGNOSIS — K21.9 GASTRO-ESOPHAGEAL REFLUX DISEASE W/OUT ESOPHAGITIS: ICD-10-CM

## 2025-04-04 DIAGNOSIS — R19.7 DIARRHEA, UNSPECIFIED: ICD-10-CM

## 2025-04-04 PROCEDURE — 95012 NITRIC OXIDE EXP GAS DETER: CPT

## 2025-04-04 PROCEDURE — 94010 BREATHING CAPACITY TEST: CPT

## 2025-04-04 PROCEDURE — 99214 OFFICE O/P EST MOD 30 MIN: CPT | Mod: 25

## 2025-04-04 RX ORDER — PREDNISONE 10 MG/1
10 TABLET ORAL
Qty: 100 | Refills: 1 | Status: ACTIVE | COMMUNITY
Start: 2025-04-04 | End: 1900-01-01

## 2025-07-07 ENCOUNTER — APPOINTMENT (OUTPATIENT)
Dept: ORTHOPEDIC SURGERY | Facility: CLINIC | Age: 80
End: 2025-07-07
Payer: MEDICARE

## 2025-07-07 PROCEDURE — 99214 OFFICE O/P EST MOD 30 MIN: CPT

## 2025-07-07 RX ORDER — METHYLPREDNISOLONE 4 MG/1
4 TABLET ORAL
Qty: 1 | Refills: 0 | Status: ACTIVE | COMMUNITY
Start: 2025-07-07 | End: 1900-01-01

## 2025-07-07 NOTE — PHYSICAL EXAM
[SLR] : negative straight leg raise [LE] : Sensory: Intact in bilateral lower extremities [Normal RLE] : Right Lower Extremity: No scars, rashes, lesions, ulcers, skin intact [Normal LLE] : Left Lower Extremity: No scars, rashes, lesions, ulcers, skin intact [Normal] : Oriented to person, place, and time, insight and judgement were intact and the affect was normal [de-identified] : Patella & Ankle Jerks are unattainable bilaterally.  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 [de-identified] : AP & Lateral Flexion Extension X-Rays of the Lumbar Spine performed on 07/19/2023 shows Multilevel Degenerative Disc Disease, most marked at L2-3 & L3-4. Compared to X-Rays taken May 24th, 2023, no significant changes. \par   Patent

## 2025-07-14 ENCOUNTER — APPOINTMENT (OUTPATIENT)
Dept: PULMONOLOGY | Facility: CLINIC | Age: 80
End: 2025-07-14
Payer: MEDICARE

## 2025-07-14 ENCOUNTER — APPOINTMENT (OUTPATIENT)
Dept: CT IMAGING | Facility: IMAGING CENTER | Age: 80
End: 2025-07-14
Payer: MEDICARE

## 2025-07-14 ENCOUNTER — OUTPATIENT (OUTPATIENT)
Dept: OUTPATIENT SERVICES | Facility: HOSPITAL | Age: 80
LOS: 1 days | End: 2025-07-14
Payer: MEDICARE

## 2025-07-14 VITALS
RESPIRATION RATE: 16 BRPM | BODY MASS INDEX: 27 KG/M2 | SYSTOLIC BLOOD PRESSURE: 128 MMHG | WEIGHT: 172 LBS | HEIGHT: 67 IN | DIASTOLIC BLOOD PRESSURE: 82 MMHG | OXYGEN SATURATION: 98 % | HEART RATE: 59 BPM | TEMPERATURE: 97.3 F

## 2025-07-14 DIAGNOSIS — Z98.49 CATARACT EXTRACTION STATUS, UNSPECIFIED EYE: Chronic | ICD-10-CM

## 2025-07-14 DIAGNOSIS — Z98.890 OTHER SPECIFIED POSTPROCEDURAL STATES: Chronic | ICD-10-CM

## 2025-07-14 DIAGNOSIS — Z96.652 PRESENCE OF LEFT ARTIFICIAL KNEE JOINT: Chronic | ICD-10-CM

## 2025-07-14 PROCEDURE — 94010 BREATHING CAPACITY TEST: CPT

## 2025-07-14 PROCEDURE — 94729 DIFFUSING CAPACITY: CPT

## 2025-07-14 PROCEDURE — 99214 OFFICE O/P EST MOD 30 MIN: CPT | Mod: 25

## 2025-07-14 PROCEDURE — 95012 NITRIC OXIDE EXP GAS DETER: CPT

## 2025-07-14 PROCEDURE — 74176 CT ABD & PELVIS W/O CONTRAST: CPT | Mod: 26

## 2025-07-14 PROCEDURE — 71250 CT THORAX DX C-: CPT | Mod: 26

## 2025-07-14 PROCEDURE — 74176 CT ABD & PELVIS W/O CONTRAST: CPT

## 2025-07-14 PROCEDURE — ZZZZZ: CPT

## 2025-07-14 PROCEDURE — 71250 CT THORAX DX C-: CPT

## 2025-07-14 RX ORDER — EPINEPHRINE 0.3 MG/.3ML
0.3 INJECTION INTRAMUSCULAR
Qty: 1 | Refills: 1 | Status: ACTIVE | COMMUNITY
Start: 2025-07-14 | End: 1900-01-01

## 2025-07-19 LAB
CCP AB SER IA-ACNC: <8 U/ML
CCP AB SER QL: NEGATIVE
ERYTHROCYTE [SEDIMENTATION RATE] IN BLOOD BY WESTERGREN METHOD: 31 MM/HR
RHEUMATOID FACT SERPL-ACNC: <10 IU/ML

## 2025-07-20 LAB
ENA JO1 AB SER-ACNC: <0.2 AL
ENA RNP AB SER-ACNC: <0.2 AL
ENA RNP AB SER-ACNC: <0.2 AL
ENA SCL70 AB SER-ACNC: <0.2 AL
ENA SM AB SER-ACNC: <0.2 AL
ENA SS-A AB SER-ACNC: 0.2 AL
ENA SS-B AB SER-ACNC: 4.1 AL

## 2025-07-22 DIAGNOSIS — R89.9 UNSPECIFIED ABNORMAL FINDING IN SPECIMENS FROM OTHER ORGANS, SYSTEMS AND TISSUES: ICD-10-CM

## 2025-07-22 LAB
ALTERNARIA TENUIS/ALTERNATA IGG: 4.5 MCG/ML
ANA TITR SER: ABNORMAL
ANA TITR SER: ABNORMAL
ASPER FUMCAPG(M3): 7.6 MCG/ML
AUREOBASCAPG(M12): 4 MCG/ML
LACEYELLA SACCHARI IGG: 23.3 MCG/ML
MICROPOLYCAPG(M22): <2 MCG/ML
PENIC CHRYCAPG(M1): 5.9 MCG/ML
PHOMA BETAE IGG: 4.3 MCG/ML
TRICHODERMA VIRIDE IGG: 3.2 MCG/ML

## 2025-07-23 LAB
ANCA AB SER-IMP: ABNORMAL
C-ANCA SER-ACNC: NEGATIVE
MITOCHONDRIA AB SER IF-ACNC: NORMAL
P-ANCA TITR SER IF: NEGATIVE

## 2025-08-12 ENCOUNTER — APPOINTMENT (OUTPATIENT)
Dept: ORTHOPEDIC SURGERY | Facility: CLINIC | Age: 80
End: 2025-08-12
Payer: MEDICARE

## 2025-08-12 ENCOUNTER — APPOINTMENT (OUTPATIENT)
Dept: CARDIOLOGY | Facility: CLINIC | Age: 80
End: 2025-08-12
Payer: MEDICARE

## 2025-08-12 VITALS
BODY MASS INDEX: 27.31 KG/M2 | HEART RATE: 69 BPM | RESPIRATION RATE: 19 BRPM | HEIGHT: 67 IN | WEIGHT: 174 LBS | SYSTOLIC BLOOD PRESSURE: 147 MMHG | DIASTOLIC BLOOD PRESSURE: 74 MMHG | OXYGEN SATURATION: 96 %

## 2025-08-12 DIAGNOSIS — R06.00 DYSPNEA, UNSPECIFIED: ICD-10-CM

## 2025-08-12 DIAGNOSIS — Z96.651 PRESENCE OF RIGHT ARTIFICIAL KNEE JOINT: ICD-10-CM

## 2025-08-12 DIAGNOSIS — Z96.652 PRESENCE OF LEFT ARTIFICIAL KNEE JOINT: ICD-10-CM

## 2025-08-12 DIAGNOSIS — I25.10 ATHEROSCLEROTIC HEART DISEASE OF NATIVE CORONARY ARTERY W/OUT ANGINA PECTORIS: ICD-10-CM

## 2025-08-12 PROCEDURE — 99214 OFFICE O/P EST MOD 30 MIN: CPT

## 2025-08-12 PROCEDURE — 99213 OFFICE O/P EST LOW 20 MIN: CPT

## 2025-08-12 PROCEDURE — 73562 X-RAY EXAM OF KNEE 3: CPT | Mod: 50

## 2025-08-12 PROCEDURE — 93000 ELECTROCARDIOGRAM COMPLETE: CPT

## (undated) DEVICE — SOL IRR POUR H2O 1500ML

## (undated) DEVICE — SUT MONOSOF 3-0 30" C-16

## (undated) DEVICE — WARMING BLANKET UPPER ADULT

## (undated) DEVICE — ORTHOALIGN KNEEALIGN TIBIAL AND FEMORAL

## (undated) DEVICE — DRSG XEROFORM 1 X 8"

## (undated) DEVICE — PACK TOTAL KNEE

## (undated) DEVICE — VENODYNE/SCD SLEEVE CALF MEDIUM

## (undated) DEVICE — GLV 8.5 PROTEXIS (WHITE)

## (undated) DEVICE — SPECIMEN CONTAINER PET

## (undated) DEVICE — SYM-STRYKER SYSTEM 7: Type: DURABLE MEDICAL EQUIPMENT

## (undated) DEVICE — DRSG DERMABOND PRINEO 60CM

## (undated) DEVICE — KIT OPTIVAC CEMENT MIXER 40GM

## (undated) DEVICE — SYR LUER LOK 50CC

## (undated) DEVICE — STYLET  ENDOTRACH 7.5MM X 10MM

## (undated) DEVICE — TRAY EPIDURAL SINGLE DOSE

## (undated) DEVICE — ELCTR AQUAMANTYS BIPOLAR SEALER 6.0

## (undated) DEVICE — SUT VICRYL PLUS 1 27" CP UNDYED

## (undated) DEVICE — SYR LUER LOK 20CC

## (undated) DEVICE — TOURNIQUET CUFF 34" DUAL PORT W PLC

## (undated) DEVICE — CRYO/CUFF GRAVITY COOLER KNEE LARGE

## (undated) DEVICE — NDL SPINAL 22G X 3.5" QUINCKE

## (undated) DEVICE — SOL IRR POUR NS 0.9% 500ML

## (undated) DEVICE — SOLIDIFIER CANN EXPRESS 3K

## (undated) DEVICE — SOL IRR BAG NS 0.9% 3000ML

## (undated) DEVICE — ORTHOALIGN PLUS UNIT

## (undated) DEVICE — WOUND IRR IRRISEPT W 0.5 CHG

## (undated) DEVICE — NDL SPINAL 18G X 3.5" (PINK)

## (undated) DEVICE — ELCTR STRYKER NEPTUNE SMOKE EVACUATION PENCIL (GREEN)

## (undated) DEVICE — SUT MONOCRYL 3-0 18" PS-1

## (undated) DEVICE — SUT VICRYL PLUS 2-0 27" CP-1 UNDYED

## (undated) DEVICE — DRSG CURITY GAUZE SPONGE 4 X 4" 12-PLY

## (undated) DEVICE — GOWN XL W TOWEL

## (undated) DEVICE — HOOD FLYTE STRYKER HELMET SHIELD

## (undated) DEVICE — HANDPIECE INTERPULSE W MULTI TIP

## (undated) DEVICE — DRSG COMBINE 5X9"